# Patient Record
Sex: FEMALE | Race: WHITE | NOT HISPANIC OR LATINO | URBAN - METROPOLITAN AREA
[De-identification: names, ages, dates, MRNs, and addresses within clinical notes are randomized per-mention and may not be internally consistent; named-entity substitution may affect disease eponyms.]

---

## 2022-09-14 ENCOUNTER — TELEMEDICINE (OUTPATIENT)
Dept: BEHAVIORAL/MENTAL HEALTH CLINIC | Facility: CLINIC | Age: 52
End: 2022-09-14
Payer: COMMERCIAL

## 2022-09-14 DIAGNOSIS — F90.2 ATTENTION DEFICIT HYPERACTIVITY DISORDER, COMBINED TYPE: ICD-10-CM

## 2022-09-14 DIAGNOSIS — F33.1 MAJOR DEPRESSIVE DISORDER, RECURRENT EPISODE, MODERATE (HCC): Primary | ICD-10-CM

## 2022-09-14 PROCEDURE — 90832 PSYTX W PT 30 MINUTES: CPT

## 2022-09-14 NOTE — PSYCH
Psychotherapy Provided: Individual Psychotherapy 30 minutes     Length of time in session: 30 minutes, follow up in 2 week  Start time: 1:15 pm  End time: 145 pm     Encounter Diagnosis     ICD-10-CM    1  Major depressive disorder, recurrent episode, moderate (HCC)  F33 1    2  Attention deficit hyperactivity disorder, combined type  F90 2        Goals addressed in session: Goal 1     Pain:      none    0    Current suicide risk : Low     DATA: Alayna shared updates mainly related to her family and current marriage  Clinician provided feedback and engaged cl in socratic questioning and weighing the outcomes    ASSESSMENT: Alert and engaged  No SI/HI    PLAN: Meet in 1-2 weeks       [address]: Diagnosis and Treatment Plan explained to Dequan Schroeder relates understanding diagnosis and is agreeable to Treatment Plan  Yes     This note was not shared with the patient due to this is a psychotherapy note     Virtual Regular Visit    This is a telemedicine visit which is being conducted through[employer] and patient was informed that this is a secure, HIPAA-compliant platform  agrees to proceed  Client acknowledged consent and understanding of privacy and security of the video platform  The patient has agreed to participate and understands they can discontinue the visit at any time  Verification of patient location: Client is home     Patient is located in the following state in which I hold an active license [place]

## 2022-10-04 ENCOUNTER — TELEPHONE (OUTPATIENT)
Dept: BEHAVIORAL/MENTAL HEALTH CLINIC | Facility: CLINIC | Age: 52
End: 2022-10-04

## 2022-10-04 NOTE — TELEPHONE ENCOUNTER
Outreached and informed of resignation   Offered termination session and scheduled for 10/11 at 12pm

## 2022-10-11 ENCOUNTER — DOCUMENTATION (OUTPATIENT)
Dept: BEHAVIORAL/MENTAL HEALTH CLINIC | Facility: CLINIC | Age: 52
End: 2022-10-11

## 2022-10-11 ENCOUNTER — TELEMEDICINE (OUTPATIENT)
Dept: BEHAVIORAL/MENTAL HEALTH CLINIC | Facility: CLINIC | Age: 52
End: 2022-10-11
Payer: COMMERCIAL

## 2022-10-11 DIAGNOSIS — F33.1 MAJOR DEPRESSIVE DISORDER, RECURRENT EPISODE, MODERATE (HCC): Primary | ICD-10-CM

## 2022-10-11 PROCEDURE — 90834 PSYTX W PT 45 MINUTES: CPT

## 2022-10-11 NOTE — PROGRESS NOTES
[facility]    Patient Name Mattie Titus     Date of Birth: 54 y o  1970      MRN: [id number]    Admission Date: 8/31/2021    Date of Transfer: October 11, 2022    Admission Diagnosis:     1  Major Depressive Disorder    Current Diagnosis:     No diagnosis found  Reason for Admission: Any Pate presented for treatment due to depression  Primary complaints included DEPRESSIVE SYMPTOMS: depressed mood, sadness, hopelessness, low energy, low motivation, decreased interest, excessive guilt  Progress in Treatment: Any Pate was seen for Psychiatric Evaluation, Medication Management, Medication Management with Psychotherapy and Individual Couseling  During the course of treatment she demonstrated good progress  Episodes of Higher Level of Care: No    Transfer request Initiated by: Psychiatrist: None Therapist: Leonor Connell    Reason for Transfer Request: clinician leaving practice    Does this individual need a clinician with specialized training/expertise?: No    Is this client working with any other [facility] Providers/Therapists?  Psychiatrist: Margaret Reveles Therapist: None    Other pertinent issues: None    Are there any specific individuals who would be a “best fit” or who have already agreed to accept this transfer request?      Psychiatrist: None   Therapist: Prisca  Rationale: Not Applicable    Attempts to maintain the current therapeutic relationship: Yes, completed termination session    Transfer request routed to Clinical Coordinator for input and/or approval      Comments from other involved providers and/or clinical coordinator: None    Lawyer Cristóbal Zhu10/11/22

## 2022-10-11 NOTE — PSYCH
Start Time: 12:03 pm  End Time: 12:45 pm     Virtual Regular Visit    Verification of patient location:    Patient is located in the following state in which I hold an active license [place]      Assessment/Plan:    Problem List Items Addressed This Visit    None         Goals addressed in session: Goal 1          Reason for visit is No chief complaint on file  Encounter provider BETTINA Martinez    Provider located at [address] [place] [address] [address]  [address] [place] [address]  [phone]      Recent Visits  Date Type Provider Dept   10/04/22 Telephone BETTINA Martinez [facility] Therapist [facility]   Showing recent visits within past 7 days and meeting all other requirements  Future Appointments  No visits were found meeting these conditions  Showing future appointments within next 150 days and meeting all other requirements       The patient was identified by name and date of birth  Joshua Ego was informed that this is a telemedicine visit and that the visit is being conducted through[employer] and patient was informed that this is a secure, HIPAA-compliant platform  She agrees to proceed     My office door was closed  No one else was in the room  She acknowledged consent and understanding of privacy and security of the video platform  The patient has agreed to participate and understands they can discontinue the visit at any time  Patient is aware this is a billable service  DATA: This session was used for termination purposes and client is aware clinician is leaving the practice  Client and clinician reflected on progress thus far and discussed future plans related to therapy     ASSESSMENT: alert and engaged  Denies SI/HI  Reports noticed improvement in symptoms with therapy and med management  PLAN: Transfer to [facility]         [facility]     No past medical history on file  No past surgical history on file      No current outpatient medications on file  No current facility-administered medications for this visit  Not on File    Review of Systems    Video Exam    There were no vitals filed for this visit      Physical Exam     I spent 42 minutes directly with the patient during this visit    This note was not shared with the patient due to this is a psychotherapy note

## 2022-11-01 ENCOUNTER — TELEPHONE (OUTPATIENT)
Dept: PSYCHIATRY | Facility: CLINIC | Age: 52
End: 2022-11-01

## 2022-11-01 NOTE — TELEPHONE ENCOUNTER
Pt has been scheduled for ROSALVA from [facility] to [facility] on 11/8 at [address] [place] [address]

## 2022-11-02 ENCOUNTER — TELEMEDICINE (OUTPATIENT)
Dept: PSYCHIATRY | Facility: CLINIC | Age: 52
End: 2022-11-02

## 2022-11-02 DIAGNOSIS — F41.1 GENERALIZED ANXIETY DISORDER: ICD-10-CM

## 2022-11-02 DIAGNOSIS — F32.A DEPRESSION, UNSPECIFIED DEPRESSION TYPE: Primary | ICD-10-CM

## 2022-11-02 RX ORDER — SERTRALINE HYDROCHLORIDE 25 MG/1
25 TABLET, FILM COATED ORAL DAILY
Qty: 30 TABLET | Refills: 1 | Status: SHIPPED | OUTPATIENT
Start: 2022-11-02

## 2022-11-02 RX ORDER — BUPROPION HYDROCHLORIDE 200 MG/1
200 TABLET, EXTENDED RELEASE ORAL 2 TIMES DAILY
Qty: 30 TABLET | Refills: 1 | Status: SHIPPED | OUTPATIENT
Start: 2022-11-02

## 2022-11-02 RX ORDER — BUPROPION HYDROCHLORIDE 300 MG/1
300 TABLET ORAL DAILY
COMMUNITY
Start: 2022-08-03 | End: 2022-11-02

## 2022-11-02 NOTE — PSYCH
Virtual Regular Visit    Verification of patient location:    Patient is located in the following state in which I hold an active license [place]      Assessment/Plan:  Decrease wellbutrin 200 mg   Continue other meds  Pt will f/u with PCP re: amenorrhea    Problem List Items Addressed This Visit    None     Visit Diagnoses     Depression, unspecified depression type    -  Primary    Relevant Medications    sertraline (ZOLOFT) 50 mg tablet    sertraline (Zoloft) 25 mg tablet    buPROPion (Wellbutrin SR) 200 MG 12 hr tablet    Generalized anxiety disorder        Relevant Medications    sertraline (ZOLOFT) 50 mg tablet    sertraline (Zoloft) 25 mg tablet    buPROPion (Wellbutrin SR) 200 MG 12 hr tablet          Goals addressed in session: Goal 1          Reason for visit is   Chief Complaint   Patient presents with   • Medication Management        Encounter provider Isaac Stehpens MD    Provider located at [address] [facility]  [address]  [phone]      Recent Visits  Date Type Provider Dept   11/01/22 Telephone Provider [facility] recent visits within past 7 days and meeting all other requirements  Today's Visits  Date Type Provider Dept   11/02/22 Telemedicine Isaac Stephens  [address] [facility] today's visits and meeting all other requirements  Future Appointments  No visits were found meeting these conditions  Showing future appointments within next 150 days and meeting all other requirements       The patient was identified by name and date of birth  Sundeep Colbert was informed that this is a telemedicine visit and that the [facility]  She agrees to proceed     My office door was closed  No one else was in the room  She acknowledged consent and understanding of privacy and security of the video platform   The patient has agreed to participate and understands they can discontinue the visit at any time  Patient is aware this is a billable service  Subjective  Daryle Crease is a 54 y o  female with depression and anxiety presents for regular f/u    Compliant with meds  SE constipation, vivid scary dreams  Pt reports feeling mostly stable  Medical - body aches; constipation  Family stress      HPI   Mood - improving; less depressed; better energy and functioning; denies mood swings  Anxiety - worries sometimes; denies panic attacks  concerned about vivid dreams    History reviewed  No pertinent past medical history  History reviewed  No pertinent surgical history  Current Outpatient Medications   Medication Sig Dispense Refill   • buPROPion (Wellbutrin SR) 200 MG 12 hr tablet Take 1 tablet (200 mg total) by mouth 2 (two) times a day 30 tablet 1   • sertraline (Zoloft) 25 mg tablet Take 1 tablet (25 mg total) by mouth daily With 50 mg 30 tablet 1   • sertraline (ZOLOFT) 50 mg tablet Take 1 tablet (50 mg total) by mouth daily 30 tablet 1     No current facility-administered medications for this visit  Not on File    Review of Systems   Constitutional: Negative for activity change and appetite change  Psychiatric/Behavioral: Positive for sleep disturbance (vivid dreams)  Negative for suicidal ideas  Video Exam    There were no vitals filed for this visit  Physical Exam  Constitutional:       Appearance: Normal appearance  She is normal weight  Neurological:      Mental Status: She is alert  Psychiatric:         Attention and Perception: Perception normal          Mood and Affect: Mood and affect normal          Speech: Speech normal          Behavior: Behavior normal  Behavior is cooperative  Thought Content:  Thought content normal          Judgment: Judgment normal           Visit Time    Visit Start Time: 9 23 am  Visit Stop Time: 9 32 am  Total Visit Duration: 18

## 2022-11-08 ENCOUNTER — TELEMEDICINE (OUTPATIENT)
Dept: BEHAVIORAL/MENTAL HEALTH CLINIC | Facility: CLINIC | Age: 52
End: 2022-11-08

## 2022-11-08 DIAGNOSIS — F32.A DEPRESSION, UNSPECIFIED DEPRESSION TYPE: Primary | ICD-10-CM

## 2022-11-08 NOTE — BH TREATMENT PLAN
Jenny Power  1970       Date of Initial Treatment Plan: 11/8/2022  Date of Current Treatment Plan: 11/08/22    Treatment Plan Number 1    Strengths/Personal Resources for Self Care:     Diagnosis:   No diagnosis found  Area of Needs: passing of father,communication with son (age 25) and his MH, blended family, relationship with mother, feeling inadequate in marriage      Long Term Goal 1: plan to follow up next scheduled session    Target Date: plan to follow up next scheduled session  Completion Date: N/A         Short Term Objectives for Goal 1: Verbalize and process thoughts and feelings  Long Term Goal 2: N/A    Target Date: N/A  Completion Date: N/A    Short Term Objectives for Goal 2: Increase assertiveness skills          Long Term Goal # 3: N/A     Target Date: N/A  Completion Date: N/A    Short Term Objectives for Goal 3: N/A    GOAL 1: Modality: Individual NAx per month   Completion Date NA    GOAL 2: Modality: Individual NAx per month   Completion Date NA     GOAL 3: Modality: Individual NAx per month   Completion Date NA      Behavioral Health Treatment Plan [facility]: Diagnosis and Treatment Plan explained to Jason Owens relates understanding diagnosis and is agreeable to Treatment Plan            Client Comments : Please share your thoughts, feelings, need and/or experiences regarding your treatment plan:

## 2022-11-08 NOTE — PSYCH
Started on [facility] embedded  Issues with connection  Received permission from client to send marshanow invite  Elvi Stephenson now session started at  [phone] am-2920 am       Psychotherapy Provided: Individual Psychotherapy 51 minutes     Length of time in session: 51 minutes, follow up in 12/15/22 at 12:00 PM week    Encounter Diagnosis     ICD-10-CM    1  Depression, unspecified depression type  F32  A        Goals addressed in session: Began to build rapport with client and to begin collaborative treatment plan in Epic  Due to time constraints treatment plan was not completed  Client agreed to complete tx plan next scheduled session  Pain:      none    Pain not dicussed     Current suicide risk : Low     DATA: Met with Marianna Oscar for scheduled individual session  Topics of discussion included family stressors and goals, client history  This is client's initial session with this psychotherapist since transfer from [facility]  Session was utilized to build rapport, collect background information on past treatment, diagnosis and history  Client has history of Outpatient counseling  Reports history of depression, ADHD  Reports she remarried in January 2022 after father passed January 2 after diagnosis with ALS (March 2020)  Now has blended family and step children  Works for [employer] and is waiting on a raise  Dicussed possible goals with assertiveness  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)  Reports feeling "not good enough" as a mother or doing enough, a lot of "shoulds " Reports she is taking her mother for a doctor's appointment for her memory  Seeking help to support 25year old son with MH difficulties and reports MH "feelings of inadequacy" impacts her marriage  She reports she would like to work towards not needing medication  Reports currently on Wellbutrin and sertraline, and has been having dreams   ADHD almost 6years old, son 25years old, daughter 24 years old  Step children include daughter (age 25) at [facility])  Reports 's ex wife can be difficult  Has a 15year old step son and 8year old step daughter  Has step children 50% of time  Sister is older  ASSESSMENT: Alma Trujillo presents with a good mood  Her affect is normal range and intensity, appropriate  Alma Trujillo  was engaged and alert in today's session  PLAN:  At the next session, this clinician and client agreed to work on [employer] tx plan  Client requested follow up message (email or epic) with clincian's ext given during today's appointment and date of next scheduled appointment  Psychotherapist will follow up with message  with date of next appointment and ext for scheduling purposes  Agreed to meet next week re: new client /building therapeutic relationship       [address]: Diagnosis and Treatment Plan explained to Petar Brown relates understanding diagnosis and is agreeable to Treatment Plan   Yes     Visit start and stop times:    11/08/22  Start Time: 0914  Stop Time: 1005  Total Visit Time: 51 minutes

## 2022-11-15 ENCOUNTER — DOCUMENTATION (OUTPATIENT)
Dept: BEHAVIORAL/MENTAL HEALTH CLINIC | Facility: CLINIC | Age: 52
End: 2022-11-15

## 2022-11-15 ENCOUNTER — TELEMEDICINE (OUTPATIENT)
Dept: BEHAVIORAL/MENTAL HEALTH CLINIC | Facility: CLINIC | Age: 52
End: 2022-11-15

## 2022-11-15 ENCOUNTER — TELEPHONE (OUTPATIENT)
Dept: PSYCHIATRY | Facility: CLINIC | Age: 52
End: 2022-11-15

## 2022-11-15 DIAGNOSIS — F32.A DEPRESSION, UNSPECIFIED DEPRESSION TYPE: Primary | ICD-10-CM

## 2022-11-15 NOTE — PSYCH
Virtual Regular Visit    Verification of patient location: observed in her home    Patient is located in the following state in which I hold an active license [place]      Assessment/Plan:    Problem List Items Addressed This Visit        Other    Depression - Primary          Goals addressed in session: Goal 1          Reason for visit is No chief complaint on file  Encounter provider Guido Be, [facility]    Provider located [address]  [address]  [phone]      Recent Visits  Date Type Provider Dept   11/08/22 Telemedicine Guido Be, [address] Therapist [facility]   Showing recent visits within past 7 days and meeting all other requirements  Today's Visits  Date Type Provider Dept   11/15/22 Telemedicine Guido Be, [address] Therapist [facility]   Showing today's visits and meeting all other requirements  Future Appointments  No visits were found meeting these conditions  Showing future appointments within next 150 days and meeting all other requirements       Gaetano Izaguirre was informed that this is a   [facility] embedded visit  She agrees to proceed     My office door was closed  No one else was in the room  The patient has agreed to participate and understands they can discontinue the visit at any time  Patient is aware this is a billable service  Subjective  Gaetano Izaguirre is a 54 y o  female    HPI     No past medical history on file  No past surgical history on file      Current Outpatient Medications   Medication Sig Dispense Refill   • buPROPion (Wellbutrin SR) 200 MG 12 hr tablet Take 1 tablet (200 mg total) by mouth 2 (two) times a day 30 tablet 1   • sertraline (Zoloft) 25 mg tablet Take 1 tablet (25 mg total) by mouth daily With 50 mg 30 tablet 1   • sertraline (ZOLOFT) 50 mg tablet Take 1 tablet (50 mg total) by mouth daily 30 tablet 1 No current facility-administered medications for this visit  Not on File    Review of Systems    Video Exam    There were no vitals filed for this visit  Physical Exam     Visit Time    Visit Start Time: 12:02 PM  Visit Stop Time:  1:10 PM  Total Visit Duration: 68 minutes    Psychotherapy Provided: Individual Psychotherapy 68 minutes     Length of time in session: 68 minutes  Encounter Diagnosis     ICD-10-CM    1  Depression, unspecified depression type  F32  A        Goals addressed in session: Goal 1     Pain:      not dicussed        Current suicide risk : Low     DATA: Met with Antony Forman for scheduled individual session  Dicussed this clinician leaving [facility]  Antony Forman agreed to continue with telehealth therapy appointment and then to transfer after today's session  Beginning of session was utilized to engage client and to inform her of [facility] Psychotherapist leaving and to provide options for continued treatment  Antony Forman utilized the session to process past relational history including being bullied as a child and romantic relationships, ex boyfriend and current relationship  Dicussed pattern she believes both her and her  engage in (self-blame of themselves) leading to issues  Dicussed fantasy of ex  what could of been and desire to work on relationship with  in therapy  Time was taken in today's session to discuss what the transfer process would look like and termination  At the end psychotherapist recommended learning about attachment styles  Antony Forman was observed tearful at the end of session however smiling  She thanked therapist for the time spent in the past two sessions and agreed to wait for follow up from client registration  ASSESSMENT: Antony Forman presents with a good mood  Her affect is normal range and intensity, appropriate  Antony Forman exhibits good therapeutic rapport with this clinician   Antony Forman continues to exhibit willingness to work on treatment goals and objectives  Donna Alexandre presents with a low risk to self and low risk to others  PLAN: Donna Alexandre will return transfer to a new assigned counselor due to this psychotherapist leaving [facility]  Behavioral Health Treatment Plan [facility]: Diagnosis and Treatment Plan explained to Mervin Necessary relates understanding diagnosis and is agreeable to Treatment Plan   Yes       Visit start and stop times:    11/15/22  Start Time: 3928  Stop Time: 1310  Total Visit Time: 68 minutes

## 2022-11-15 NOTE — TELEPHONE ENCOUNTER
Pt has been scheduled for ROSALVA from Andrés Lerner on 11/22 at [address] [place] [address] with Jil Snyder

## 2022-11-15 NOTE — PSYCH
Virtual Regular Visit    Verification of patient location:    Patient is located in the following state in which I hold an active license [place]      Assessment/Plan:    Problem List Items Addressed This Visit        Other    Depression - Primary          Goals addressed in session:  Initial transfer session with client collecting information  Dicussed goals  Reason for visit is No chief complaint on file  Encounter provider Nyasia Spence, [facility]    Provider  [facility] THERAPIST [facility]  [address]  [phone]      Recent Visits  Date Type Provider Dept   11/08/22 Telemedicine Nyasia Spence, [address] [facility] Therapist [facility]   Showing recent visits within past 7 days and meeting all other requirements  Future Appointments  No visits were found meeting these conditions  Showing future appointments within next 150 days and meeting all other requirements       The patient was identified by name and date of birth  Jenny Power was informed that this is a telemedicine visit and that the visit is being conducted thr[employer]he [employer] platform  She agrees to proceed     My office door was closed  No one else was in the room  She acknowledged consent and understanding of privacy and security of the video platform  The patient has agreed to participate and understands they can discontinue the visit at any time  Patient is aware this is a billable service  Subjective  Jenny Power is a 54 y o  female    HPI     No past medical history on file  No past surgical history on file      Current Outpatient Medications   Medication Sig Dispense Refill   • buPROPion (Wellbutrin SR) 200 MG 12 hr tablet Take 1 tablet (200 mg total) by mouth 2 (two) times a day 30 tablet 1   • sertraline (Zoloft) 25 mg tablet Take 1 tablet (25 mg total) by mouth daily With 50 mg 30 tablet 1   • sertraline (ZOLOFT) 50 mg tablet Take 1 tablet (50 mg total) by mouth daily 30 tablet 1     No current facility-administered medications for this visit  Not on File    Review of Systems    Video Exam    There were no vitals filed for this visit      Physical Exam     Visit Time    Visit Start Time: 914am  Visit Stop Time: 10:05am  Total Visit Duration: 51 minutes

## 2022-11-22 ENCOUNTER — SOCIAL WORK (OUTPATIENT)
Dept: BEHAVIORAL/MENTAL HEALTH CLINIC | Facility: CLINIC | Age: 52
End: 2022-11-22

## 2022-11-22 DIAGNOSIS — F33.1 MAJOR DEPRESSIVE DISORDER, RECURRENT EPISODE, MODERATE (HCC): Primary | ICD-10-CM

## 2022-11-22 DIAGNOSIS — F90.2 ATTENTION DEFICIT HYPERACTIVITY DISORDER, COMBINED TYPE: ICD-10-CM

## 2022-11-22 NOTE — PSYCH
Psychotherapy Provided: Individual Psychotherapy 57 minutes     Length of time in session: 57 minutes, follow up in 1 week    Encounter Diagnosis     ICD-10-CM    1  Major depressive disorder, recurrent episode, moderate (HCC)  F33 1       2  Attention deficit hyperactivity disorder, combined type  F90 2           Goals addressed in session: Goal 1 and Goal 2     Pain:      moderate to severe    0    Current suicide risk : Low      DATA: Met with Meghan Herman for scheduled individual session  Topics of discussion included relationship with significant other, family stressors, mood regulation and symptoms, intimacy and sexuality and grief and loss  Client reported that her son Yogesh aBe is celebrating his 11th birthday today  She reported having two other biological children named Noni Neri (24years old) and Noni Neri (25years old)  Clinet reported her daughter Noni Neri dropped out of high school when she was 18years old and obtained her GED she recently went through a break-up with a long term boyfriend and moved back home this past July on her 23st birthday  Client discussed that her  has children from two past marriage, he has an 25year old daughter from the fist marriage and two sons, Hazel Whittaker (15years old) and Almond Harada (8years old) from the second marriage  Client discussed that her father past away January second of this year and this will be the first Thanksgiving without him  She discussed their plans and her concern with mom  Client reported that her sister (54 years old) helps to take care of their mom and gets her prepared for bed each evening and her bother (60 years old) comes to visit every Thursday  Client discussed that mom has been having a hard time managing grief and has been reaching out to strangers and being conned into sending money and gift cards to strangers  Client discussed that mom is lonely and she has having a hard time connecting her with healthy things   Therapist provided contact information for Demian  [facility] [address] team to provide additional resources and support  Client reported that she was previously  to the father of her children and recently remarried January 22nd of this year  Client reported that her  has suffered from a lot of trauma in his life and has experienced medical issues resulting in needing his leg amputated, which has made life for challenging for him  Client reported issues related to intimacy, that she believes are physically connected to her 's leg amputation  She dicussed that this has caused additional stress in their relationship and her  is fearful of her cheating on him as his past two wives did  Client shows evidence of utilizing emotion regulation skills and effective communication skills skills to manage mental health symptoms  During this session, this clinician used the following therapeutic modalities: engagement strategies, supportive psychotherapy and Motivational Interviewing  ASSESSMENT: Diya Pickett presents with a depressed, anxious mood  Her affect is normal range and intensity, appropriate  Diya Pickett exhibits early engagement with this clinician  Diya Pickett continues to exhibit willingness to work on treatment goals and objectives  Diya Pickett presents with a minimal risk of suicide, minimal risk of self-harm, and minimal risk of harm to others  PLAN: Diya Pickett will return in 1 week for the next scheduled session  Between sessions, Diya Pickett will continue to utilize positive coping skills to manage mood and will report back during the next session re: successes and barriers  At the next session, this clinician will use engagement strategies, supportive psychotherapy and Motivational Interviewing to address symptoms and perceived issues in relationships, in an effort to [address] with meeting treatment goals           Behavioral Health Treatment Plan [facility]: Diagnosis and Treatment Plan explained to Nehemias Brandon relates understanding diagnosis and is agreeable to Treatment Plan   Yes     Visit start and stop times:    11/22/22  Start Time: 0901  Stop Time: 6942  Total Visit Time: 57 minutes

## 2022-11-23 ENCOUNTER — TELEPHONE (OUTPATIENT)
Dept: BEHAVIORAL/MENTAL HEALTH CLINIC | Facility: CLINIC | Age: 52
End: 2022-11-23

## 2022-11-23 NOTE — TELEPHONE ENCOUNTER
ANDRE contacted patient to reschedule her appointment for 11/29   Client was rescheduled for 12/1 at 2:00pm

## 2022-11-30 ENCOUNTER — TELEMEDICINE (OUTPATIENT)
Dept: PSYCHIATRY | Facility: CLINIC | Age: 52
End: 2022-11-30

## 2022-11-30 DIAGNOSIS — F32.A DEPRESSION, UNSPECIFIED DEPRESSION TYPE: Primary | ICD-10-CM

## 2022-11-30 DIAGNOSIS — F41.1 GENERALIZED ANXIETY DISORDER: ICD-10-CM

## 2022-11-30 RX ORDER — BUPROPION HYDROCHLORIDE 150 MG/1
150 TABLET ORAL DAILY
Qty: 30 TABLET | Refills: 1 | Status: SHIPPED | OUTPATIENT
Start: 2022-11-30

## 2022-11-30 RX ORDER — SERTRALINE HYDROCHLORIDE 25 MG/1
25 TABLET, FILM COATED ORAL DAILY
Qty: 30 TABLET | Refills: 1 | Status: SHIPPED | OUTPATIENT
Start: 2022-11-30

## 2022-11-30 NOTE — PSYCH
Virtual Regular Visit    Verification of patient location:    Patient is located in the following state in which I hold an active license [place]      Assessment/Plan:    Problem List Items Addressed This Visit        Other    Depression - Primary    Relevant Medications    sertraline (ZOLOFT) 50 mg tablet    sertraline (Zoloft) 25 mg tablet    buPROPion (Wellbutrin XL) 150 mg 24 hr tablet   Other Visit Diagnoses     Generalized anxiety disorder        Relevant Medications    sertraline (ZOLOFT) 50 mg tablet    sertraline (Zoloft) 25 mg tablet    buPROPion (Wellbutrin XL) 150 mg 24 hr tablet          Goals addressed in session: Goal 1          Reason for visit is   Chief Complaint   Patient presents with   • Medication Management        Encounter provider Marilu De MD    Provider located at [id number] [facility]  [address]  [phone]      Recent Visits  No visits were found meeting these conditions  Showing recent visits within past 7 days and meeting all other requirements  Today's Visits  Date Type Provider Dept   11/30/22 Telemedicine Marilu De, [facility] today's visits and meeting all other requirements  Future Appointments  No visits were found meeting these conditions  Showing future appointments within next 150 days and meeting all other requirements       The patient was identified by name and date of birth  Sue Arias was informed that this is a telemedicine visit and that [facility]  She agrees to proceed     My office door was closed  No one else was in the room  She acknowledged consent and understanding of privacy and security of the video platform  The patient has agreed to participate and understands they can discontinue the visit at any time  Patient is aware this is a billable service       Subjective  Sue Arias is a 54 y o  female with depression and anxiety presents for regular f/u     compliant with meds  SE wellbutrin constipation and vivid dreams - less severe  Needs PCP f/u for amenorrhea  Getting ready  for holidays    HPI   Mood - better energy, but low motivation  Less depressed; functions close to baseline  Social , active  Anxiety - worries about holidays    History reviewed  No pertinent past medical history  History reviewed  No pertinent surgical history  Current Outpatient Medications   Medication Sig Dispense Refill   • buPROPion (Wellbutrin XL) 150 mg 24 hr tablet Take 1 tablet (150 mg total) by mouth daily 30 tablet 1   • sertraline (Zoloft) 25 mg tablet Take 1 tablet (25 mg total) by mouth daily With 50 mg 30 tablet 1   • sertraline (ZOLOFT) 50 mg tablet Take 1 tablet (50 mg total) by mouth daily 30 tablet 1     No current facility-administered medications for this visit  Not on File    Review of Systems   Constitutional: Negative for activity change and appetite change  Psychiatric/Behavioral: Negative for sleep disturbance and suicidal ideas  Video Exam    There were no vitals filed for this visit  Physical Exam  Constitutional:       Appearance: Normal appearance  She is normal weight  Neurological:      Mental Status: She is alert  Psychiatric:         Attention and Perception: Attention and perception normal          Mood and Affect: Affect normal  Mood is anxious  Speech: Speech normal          Behavior: Behavior normal  Behavior is cooperative  Thought Content:  Thought content normal          Judgment: Judgment normal           Visit Time    Visit Start Time: 8 59  Visit Stop Time: 9 10 am   Total Visit Duration: 18 minutes

## 2022-12-01 ENCOUNTER — TELEMEDICINE (OUTPATIENT)
Dept: BEHAVIORAL/MENTAL HEALTH CLINIC | Facility: CLINIC | Age: 52
End: 2022-12-01

## 2022-12-01 DIAGNOSIS — F33.1 MAJOR DEPRESSIVE DISORDER, RECURRENT EPISODE, MODERATE (HCC): ICD-10-CM

## 2022-12-01 DIAGNOSIS — F90.2 ATTENTION DEFICIT HYPERACTIVITY DISORDER, COMBINED TYPE: Primary | ICD-10-CM

## 2022-12-02 NOTE — PSYCH
Virtual Regular Visit    Verification of patient location:    Patient is located in the following state in which I hold an active license [place]      Assessment/Plan:    Problem List Items Addressed This Visit        Other    Major depressive disorder, recurrent episode, moderate ([address] )    Attention deficit hyperactivity disorder, combined type - Primary       Goals addressed in session: Goal 1          Reason for visit is No chief complaint on file  Encounter provider MARIOLA Bojorquez    Provider located at [address] [place] [address]  [phone]      Recent Visits  Date Type Provider Dept   12/01/22 [facility]icine [facility]   Showing recent visits within past 7 days and meeting all other requirements  Future Appointments  No visits were found meeting these conditions  Showing future appointments within next 150 days and meeting all other requirements       The patient was identified by name and date of birth  Zuleyka Ray was informed that this is a telemedicine visit and that [facility]  She agrees to proceed     My office door was closed  No one else was in the room  She acknowledged consent and understanding of privacy and security of the video platform  The patient has agreed to participate and understands they can discontinue the visit at any time  Patient is aware this is a billable service  Subjective  Zuleyka Ray is a 54 y o  female  DATA: Met with Delmer Murillo for scheduled individual session  Topics of discussion included relationship with significant other, family stressors, trauma history, financial issues, mood regulation and symptoms, intimacy and sexuality and grief and loss  Client reported meeting with her provider to reduce her Wellbutrin dosage from 300mg to 150mg due to experiencing an increase in disturbing dreams at night and not seeing an improvement in mood  Client reported she has been trying to make more of an effort to get things done and has been working on organizing her room, which has been a goal she has been working on for the past two years  Client discussed how she spent Thanksgiving and reported that she handled it better than she had thought as this was the first Thanksgiving without her father that they actually celebrated  Patient reported that they had 100 people together for dinner and she put a picture of both her father and her niece, who passed away with a saying and a candle  She discussed [place] is going to be challenging for her and shared that she doesn't feel as though she is able to hold onto any of her traditions now that she has come together with her   She reported that she doesn't feel like she has a choice in the decorations and things like that because they moved into his home and he takes over  Therapist encouraged client to have a conversation with her  about this and helped put the situation into a different perspective for her to understand that her traditions matter as well  Client discussed that it has been challenging to schedule times for her children and her 's children to do holiday things, which has also made things more difficult  Client discussed her feelings of jealousy and how she is concerned that it is impacting her relationship as well as her negative self image  Client discussed her previous relationship with her ex- and identified that he was her best friend and there was a lot of hurt because she felt like he didn't want her and there was issues with intimacy, she later discovered that the issue was pornography and she felt like she started comparing herself to everyone else  Client discussed that she is currently heavier now than she previously was and does not feel desirable   Client shows evidence of utilizing weighing pros and cons and effective communication skills skills to manage mental health symptoms  During this session, this clinician used the following therapeutic modalities: engagement strategies, CBT techniques, Motivational Interviewing and solution-focused therapy  The clinician assigned the following for Meghan Herman to complete prior to the next session: Client to write a letter from her current self, to a past self at a time in her life when she really needed support  What would you want to tell yourself? What would you want to know? ASSESSMENT: Meghan Herman presents with a less anxious mood  Her affect is normal range and intensity, appropriate  Meghan Herman exhibits early engagement with this clinician  Meghan Herman continues to exhibit willingness to work on treatment goals and objectives  Meghan Herman presents with a minimal risk of suicide, minimal risk of self-harm, and minimal risk of harm to others  PLAN: Meghan Herman will return in 1 week for the next scheduled session  Between sessions, Meghan Herman will continue to utilize positive coping skills to manage mood and will report back during the next session re: successes and barriers  At the next session, this clinician will use engagement strategies, CBT techniques, Motivational Interviewing and solution-focused therapy to address symptoms, in an effort to [facility] with meeting treatment goals  HPI     No past medical history on file  No past surgical history on file  Current Outpatient Medications   Medication Sig Dispense Refill   • buPROPion (Wellbutrin XL) 150 mg 24 hr tablet Take 1 tablet (150 mg total) by mouth daily 30 tablet 1   • sertraline (Zoloft) 25 mg tablet Take 1 tablet (25 mg total) by mouth daily With 50 mg 30 tablet 1   • sertraline (ZOLOFT) 50 mg tablet Take 1 tablet (50 mg total) by mouth daily 30 tablet 1     No current facility-administered medications for this visit          Not on File    Review of Systems    Video Exam    There were no vitals filed for this visit      Physical Exam       Visit start and stop times:    12/02/22  Start Time: 8966  Stop Time: 1500  Total Visit Time: 56 minutes

## 2022-12-06 ENCOUNTER — TELEMEDICINE (OUTPATIENT)
Dept: BEHAVIORAL/MENTAL HEALTH CLINIC | Facility: CLINIC | Age: 52
End: 2022-12-06

## 2022-12-06 DIAGNOSIS — F90.2 ATTENTION DEFICIT HYPERACTIVITY DISORDER, COMBINED TYPE: ICD-10-CM

## 2022-12-06 DIAGNOSIS — F33.1 MAJOR DEPRESSIVE DISORDER, RECURRENT EPISODE, MODERATE (HCC): Primary | ICD-10-CM

## 2022-12-06 NOTE — PSYCH
Virtual Regular Visit    Verification of patient location:    Patient is located in the following state in which I hold an active license [place]      Assessment/Plan:    Problem List Items Addressed This Visit        Other    Major depressive disorder, recurrent episode, moderate ([address] ) - Primary    Attention deficit hyperactivity disorder, combined type       Goals addressed in session: Goal 1 and Goal 3           Reason for visit is No chief complaint on file  Encounter provider MARIOLA Fernandez    Provider located at [address] [place] [address]  [phone]      Recent Visits  Date Type Provider Dept   12/01/22 Telemedicine Desean Bill, [address] Therapist [facility]   Showing recent visits within past 7 days and meeting all other requirements  Today's Visits  Date Type Provider Dept   12/06/22 Telemedicine Desean Bill [address] Therapist [facility]   Showing today's visits and meeting all other requirements  Future Appointments  No visits were found meeting these conditions  Showing future appointments within next 150 days and meeting all other requirements       The patient was identified by name and date of birth  Arlin Coleman was informed that this is a telemedicine visit and that [facility]  She agrees to proceed     My office door was closed  No one else was in the room  She acknowledged consent and understanding of privacy and security of the video platform  The patient has agreed to participate and understands they can discontinue the visit at any time  Patient is aware this is a billable service  Subjective  Arlin Coleman is a 54 y o  female  DATA: Met with Candace Childers for scheduled individual session   Topics of discussion included family stressors, marital stress, parenting concerns, mood regulation and symptoms and grief and loss  Client reported having an "okay" week  Client showed therapist her pet dog and parrot and reported that they are good supports for her  Client discussed that her youngest son Taina Simpson (6years old) had a rough night and had been arguing with her step-son Serena Long (15years old)  Client reported that her step-son has been dealing with a lot of stress regarding his relationship with his biological mother and their relationship and he has been acting out as a result  Client reported knowing that she needs to have a conversation with her  Thor Johnson about his behavior, however is concerned that he will internalize the issues and be offended  Therapist provided support and explored this with client  Client discussed that her son Taina Simpson was also reading a book for school last night where the character's grandfather passes away and he expressed to patient that he feels guilty and regrets not going over to his own grandfather in the moments he passed to say goodbye, although he was in the room  Client discussed how she handled the situation and therapist provided support and praised client for her approach  Client was tearful and acknowledged missing her father  Client discussed stressors between her and her 's second wife and how that has impacted her self esteem  Client shows evidence of utilizing CBT thought record, weighing pros and cons, emotion regulation skills and effective communication skills skills to manage mental health symptoms  During this session, this clinician used the following therapeutic modalities: engagement strategies, CBT techniques and solution-focused therapy  The clinician assigned the following for Renu Guerrero to complete prior to the next session:  ASSESSMENT: Renu Guerrero presents with a less anxious mood  Her affect is tearful, mood-congruent  Renukelly Guerrero exhibits good therapeutic rapport with this clinician   Renu Guerrero continues to exhibit willingness to work on treatment goals and objectives  Slim Escobedo presents with a minimal risk of suicide, minimal risk of self-harm, and minimal risk of harm to others  PLAN: Slim Escobedo will return in 1 week for the next scheduled session  Between sessions, Slim Escobedo will continue to uitlize positive coping skills and will report back during the next session re: successes and barriers  At the next session, this clinician will use engagement strategies, CBT techniques, Motivational Interviewing and solution-focused therapy to address symptoms and relationship concerns, in an effort to [facility] with meeting treatment goals  HPI     No past medical history on file  No past surgical history on file  Current Outpatient Medications   Medication Sig Dispense Refill   • buPROPion (Wellbutrin XL) 150 mg 24 hr tablet Take 1 tablet (150 mg total) by mouth daily 30 tablet 1   • sertraline (Zoloft) 25 mg tablet Take 1 tablet (25 mg total) by mouth daily With 50 mg 30 tablet 1   • sertraline (ZOLOFT) 50 mg tablet Take 1 tablet (50 mg total) by mouth daily 30 tablet 1     No current facility-administered medications for this visit  Not on File    Review of Systems    Video Exam    There were no vitals filed for this visit      Physical Exam     12/06/22  Start Time: 0901  Stop Time: 4021  Total Visit Time: 52 minutes

## 2022-12-23 ENCOUNTER — TELEPHONE (OUTPATIENT)
Dept: BEHAVIORAL/MENTAL HEALTH CLINIC | Facility: CLINIC | Age: 52
End: 2022-12-23

## 2022-12-23 ENCOUNTER — TELEMEDICINE (OUTPATIENT)
Dept: BEHAVIORAL/MENTAL HEALTH CLINIC | Facility: CLINIC | Age: 52
End: 2022-12-23

## 2022-12-23 DIAGNOSIS — F33.1 MAJOR DEPRESSIVE DISORDER, RECURRENT EPISODE, MODERATE (HCC): Primary | ICD-10-CM

## 2022-12-23 DIAGNOSIS — F90.2 ATTENTION DEFICIT HYPERACTIVITY DISORDER, COMBINED TYPE: ICD-10-CM
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
## 2022-12-30 ENCOUNTER — TELEMEDICINE (OUTPATIENT)
Dept: BEHAVIORAL/MENTAL HEALTH CLINIC | Facility: CLINIC | Age: 52
End: 2022-12-30

## 2022-12-30 DIAGNOSIS — F33.1 MAJOR DEPRESSIVE DISORDER, RECURRENT EPISODE, MODERATE (HCC): Primary | ICD-10-CM

## 2022-12-30 DIAGNOSIS — F90.2 ATTENTION DEFICIT HYPERACTIVITY DISORDER, COMBINED TYPE: ICD-10-CM

## 2022-12-30 NOTE — PSYCH
Virtual Regular Visit    Verification of patient location:    Patient is located in the following state in which I hold an active license [place]      Assessment/Plan:    Problem List Items Addressed This Visit        Other    Major depressive disorder, recurrent episode, moderate ([address] ) - Primary    Attention deficit hyperactivity disorder, combined type       Goals addressed in session: Goal 1          Reason for visit is No chief complaint on file  Encounter provider MARIOLA Booth    Provider located at [address] [place] [address]  [phone]      Recent Visits  Date Type Provider Dept   12/30/22 Telemedicine Isaac Lubin, [address] [facility] Therapist op   Showing recent visits within past 7 days and meeting all other requirements  Future Appointments  No visits were found meeting these conditions  Showing future appointments within next 150 days and meeting all other requirements       The patient was identified by name and date of birth  Mimi Beck was informed that this is a telemedicine visit and [facility]  She agrees to proceed     My office door was closed  No one else was in the room  She acknowledged consent and understanding of privacy and security of the video platform  The patient has agreed to participate and understands they can discontinue the visit at any time  Patient is aware this is a billable service  Subjective  Mimi Beck is a 50 y o  female  DATA: Met with Norleen Moritz for scheduled individual session  Topics of discussion included relationship with significant other, family stressors, marital stress, financial issues, mood regulation and symptoms and grief and loss  Client reported that the past week has been challenging for her emotionally   She shared how she celebrated Cristina with her family and became tearful discussing how she misses her father  Therapist actively listened and provided empathy  Client discussed that she plans on having a conversation with her  about the concerns that she has with their relationship  Therapist provided support and explored this with client; client engaged  Client shows evidence of utilizing weighing pros and cons, emotion regulation skills and effective communication skills skills to manage mental health symptoms  During this session, this clinician used the following therapeutic modalities: engagement strategies, supportive psychotherapy, CBT techniques and solution-focused therapy  ASSESSMENT: Slim Escobedo presents with a depressed, less anxious mood  Her affect is tearful, mood-congruent  Slim Escobedo exhibits good therapeutic rapport with this clinician  Slim Escobedo continues to exhibit willingness to work on treatment goals and objectives  Slim Escobedo presents with a minimal risk of suicide, minimal risk of self-harm, and minimal risk of harm to others  PLAN: Slim Escobedo will return in 2 weeks for the next scheduled session  Between sessions, Slim Escobedo will continue to utilize positive coping skills to manage mood and will report back during the next session re: successes and barriers  At the next session, this clinician will use engagement strategies, CBT techniques and solution-focused therapy to address symptoms and relationship concerns, in an effort to [facility] with meeting treatment goals  HPI     No past medical history on file  No past surgical history on file      Current Outpatient Medications   Medication Sig Dispense Refill   • buPROPion (Wellbutrin XL) 150 mg 24 hr tablet Take 1 tablet (150 mg total) by mouth daily 30 tablet 1   • sertraline (Zoloft) 25 mg tablet Take 1 tablet (25 mg total) by mouth daily With 50 mg 30 tablet 1   • sertraline (ZOLOFT) 50 mg tablet Take 1 tablet (50 mg total) by mouth daily 30 tablet 1     No current facility-administered medications for this visit  Not on File    Review of Systems    Video Exam    There were no vitals filed for this visit      Physical Exam     01/01/23  Start Time: 6959  Stop Time: 1600  Total Visit Time: 52 minutes

## 2023-01-05 ENCOUNTER — TELEMEDICINE (OUTPATIENT)
Dept: PSYCHIATRY | Facility: CLINIC | Age: 53
End: 2023-01-05

## 2023-01-05 DIAGNOSIS — F41.1 GENERALIZED ANXIETY DISORDER: ICD-10-CM

## 2023-01-05 DIAGNOSIS — F32.A DEPRESSION, UNSPECIFIED DEPRESSION TYPE: Primary | ICD-10-CM

## 2023-01-05 RX ORDER — SERTRALINE HYDROCHLORIDE 25 MG/1
25 TABLET, FILM COATED ORAL DAILY
Qty: 30 TABLET | Refills: 1 | Status: SHIPPED | OUTPATIENT
Start: 2023-01-05

## 2023-01-05 RX ORDER — BUPROPION HYDROCHLORIDE 100 MG/1
100 TABLET, EXTENDED RELEASE ORAL DAILY
Qty: 30 TABLET | Refills: 1 | Status: SHIPPED | OUTPATIENT
Start: 2023-01-05

## 2023-01-05 NOTE — PSYCH
Virtual Regular Visit    Verification of patient location:    Patient is located in the following state in which I hold an [place]      Assessment/Plan:    Problem List Items Addressed This Visit        Other    Depression - Primary    Relevant Medications    sertraline (ZOLOFT) 50 mg tablet    sertraline (Zoloft) 25 mg tablet    buPROPion (Wellbutrin SR) 100 mg 12 hr tablet   Other Visit Diagnoses     Generalized anxiety disorder        Relevant Medications    sertraline (ZOLOFT) 50 mg tablet    sertraline (Zoloft) 25 mg tablet    buPROPion (Wellbutrin SR) 100 mg 12 hr tablet          Goals addressed in session: Goal 1          Reason for visit is   Chief Complaint   Patient presents with   • Medication Management        Encounter provider Candy Mendosa MD    Provider located at [address] [facility]  [address]  [phone]      Recent Visits  No visits were found meeting these conditions  Showing recent visits within past 7 days and meeting all other requirements  Today's Visits  Date Type Provider Dept   01/05/23 Telemedicine Candy Mendosa, [facility] today's visits and meeting all other requirements  Future Appointments  No visits were found meeting these conditions  Showing future appointments within next 150 days and meeting all other requirements       The patient was identified by name and date of birth  Jennifer Bond was informed that this is a telemedicine visit and [facility]  She agrees to proceed     My office door was closed  No one else was in the room  She acknowledged consent and understanding of privacy and security of the video platform  The patient has agreed to participate and understands they can discontinue the visit at any time  Patient is aware this is a billable service       Subjective  Jennifer Bond is a 50 y o  female with depression and anxiety presents for regular f/u    Compliant with meds  SE - constipation, vivid dreams - improving; dreams are less disturbing  PCP visit - menopause blood work and w/u   Father  1 year ago      HPI   Mood - worse during holidays; missing her  father; crying, sad  Improving , better energy  Anxiety - continues to worry  - " manageable"    History reviewed  No pertinent past medical history  History reviewed  No pertinent surgical history  Current Outpatient Medications   Medication Sig Dispense Refill   • buPROPion (Wellbutrin SR) 100 mg 12 hr tablet Take 1 tablet (100 mg total) by mouth in the morning 30 tablet 1   • sertraline (Zoloft) 25 mg tablet Take 1 tablet (25 mg total) by mouth daily With 50 mg 30 tablet 1   • sertraline (ZOLOFT) 50 mg tablet Take 1 tablet (50 mg total) by mouth daily 30 tablet 1     No current facility-administered medications for this visit  Not on File    Review of Systems   Constitutional: Negative for activity change and appetite change  Psychiatric/Behavioral: Negative for sleep disturbance and suicidal ideas  The patient is nervous/anxious  Video Exam    There were no vitals filed for this visit  Physical Exam  Constitutional:       Appearance: Normal appearance  She is normal weight  Neurological:      Mental Status: She is alert  Psychiatric:         Attention and Perception: Attention and perception normal          Mood and Affect: Affect normal  Mood is depressed  Speech: Speech normal          Behavior: Behavior normal  Behavior is cooperative  Thought Content:  Thought content normal          Judgment: Judgment normal           Visit Time    Visit Start Time: 4 58 pm   Visit Stop Time: 5 05 pm   Total Visit Duration: 17 minutes

## 2023-01-12 ENCOUNTER — TELEMEDICINE (OUTPATIENT)
Dept: BEHAVIORAL/MENTAL HEALTH CLINIC | Facility: CLINIC | Age: 53
End: 2023-01-12

## 2023-01-12 DIAGNOSIS — F90.2 ATTENTION DEFICIT HYPERACTIVITY DISORDER, COMBINED TYPE: ICD-10-CM

## 2023-01-12 DIAGNOSIS — F33.1 MAJOR DEPRESSIVE DISORDER, RECURRENT EPISODE, MODERATE (HCC): Primary | ICD-10-CM

## 2023-01-12 NOTE — PSYCH
Virtual Regular Visit    Verification of patient location:    Patient is located in the following state in which I [place] an [place] license [place]      Assessment/Plan:    Problem List Items Addressed This Visit        Other    Major depressive disorder, recurrent episode, moderate ([address] ) - Primary    Attention deficit hyperactivity disorder, combined type       Goals addressed in session: Goal 1          Reason for visit is No chief complaint on file  Encounter provider MARIOLA Edwards    Provider located at [address] [place] [address]  [phone]      Recent Visits  No visits were found meeting these conditions  Showing recent visits within past 7 days and meeting all other requirements  Today's Visits  Date Type Provider Dept   01/12/23 Telemedicine Micheal Edwards[address] [facility] Therapist op   Showing today's visits and meeting all other requirements  Future Appointments  No visits were found meeting these conditions  Showing future appointments within next 150 days and meeting all other requirements       The patient was identified by name and date of birth  Sowmya Michelle was informed that this is a telemedicine visit and [facility] the [facility]  She agrees to proceed     My office door was closed  No one else was in the room  She acknowledged consent and understanding of privacy and security of the video platform  The patient has agreed to participate and understands they can discontinue the visit at any time  Patient is aware this is a billable service  Subjective  Sowmya Michelle is a 50 y o  female  DATA: Met with Charisse Lopez for scheduled individual session  Topics of discussion included family stressors, marital stress, parenting concerns, financial issues and mood regulation and symptoms   Client reported things have been "pretty good" since her last session  She discussed that her and her  had to deal with his ex-wife last week regarding parenting, which she reported was challenging  Therapist actively listened and provided support and empathy  Client reported recent financial issues causing additional stress  Therapist asked if she has had the discussion with her  that she has been wanting to have and she reported she has not found the right time  Client discussed her one year wedding anniversary is coming up on 1/22 and she plans to wait until after that  Client discussed she has been focusing on her responses to her ; therapist explored this with her  Client reported that 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 and is looking forward to it  Client shows evidence of utilizing weighing pros and cons, emotion regulation skills and effective communication skills skills to manage mental health symptoms  During this session, this clinician used the following therapeutic modalities: engagement strategies, supportive psychotherapy, CBT techniques and Motivational Interviewing  Clinician provided psychoeducation regarding use of [employer]   Jose Raulashwin Salgadoer ASSESSMENT: Renu Guerrero presents with a improved, less anxious, less depressed mood  Her affect is normal range and intensity, appropriate  Renu Guerrero exhibits strong therapeutic rapport with this clinician  Renu Guerrero continues to exhibit willingness to work on treatment goals and objectives  Renu Guerrero presents with a minimal risk of suicide, minimal risk of self-harm, and minimal risk of harm to others  PLAN: Renu Guerrero will return in 2 weeks for the next scheduled session  Between sessions, Renu Guerrero will continue to utilize positive coping skills to manage mood and will report back during the next session re: successes and barriers   At the next session, this clinician will use engagement strategies, client-centered therapy, CBT techniques and Motivational Interviewing to address symptoms and relationship concerns, in an effort to [address] [facility] [address] with meeting treatment goals  HPI     No past medical history on file  No past surgical history on file  Current Outpatient Medications   Medication Sig Dispense Refill   • buPROPion (Wellbutrin SR) 100 mg 12 hr tablet Take 1 tablet (100 mg total) by mouth in the morning 30 tablet 1   • sertraline (Zoloft) 25 mg tablet Take 1 tablet (25 mg total) by mouth daily With 50 mg 30 tablet 1   • sertraline (ZOLOFT) 50 mg tablet Take 1 tablet (50 mg total) by mouth daily 30 tablet 1     No current facility-administered medications for this visit  Not on File    Review of Systems    Video Exam    There were no vitals filed for this visit      Physical Exam     01/12/23  Start Time: 0803  Stop Time: 9971  Total Visit Time: 56 minutes

## 2023-01-30 ENCOUNTER — TELEMEDICINE (OUTPATIENT)
Dept: BEHAVIORAL/MENTAL HEALTH CLINIC | Facility: CLINIC | Age: 53
End: 2023-01-30

## 2023-01-30 DIAGNOSIS — F90.2 ATTENTION DEFICIT HYPERACTIVITY DISORDER, COMBINED TYPE: ICD-10-CM

## 2023-01-30 DIAGNOSIS — F33.1 MAJOR DEPRESSIVE DISORDER, RECURRENT EPISODE, MODERATE (HCC): Primary | ICD-10-CM

## 2023-01-30 NOTE — PSYCH
Virtual Regular Visit    Verification of patient location:    Patient is located in the following state in which I hold an active license [place]             Reason for visit is   Chief Complaint   Patient presents with   • Virtual Regular Visit        Encounter provider MARIOLA Vidal    Provider located at [address] [place] [address] [address] [place] [address]  [phone]      Recent Visits  No visits were found meeting these conditions  Showing recent visits within past 7 days and meeting all other requirements  Today's Visits  Date Type Provider Dept   01/30/23 Telemedicine Altaf Cook, [address] [facility] Therapist [facility]   Showing today's visits and meeting all other requirements  Future Appointments  No visits were found meeting these conditions  Showing future appointments within next 150 days and meeting all other requirements       The patient was identified by name and date of birth  Dillan Guerra was informed that this is a telemedicine visit and [facility]  She agrees to proceed     My office door was closed  No one else was in the room  She acknowledged consent and understanding of privacy and security of the video platform  The patient has agreed to participate and understands they can discontinue the visit at any time  Patient is aware this is a billable service  Subjective  Dillan Guerra is a 50 y o  female   HPI     No past medical history on file  No past surgical history on file      Current Outpatient Medications   Medication Sig Dispense Refill   • buPROPion (Wellbutrin SR) 100 mg 12 hr tablet Take 1 tablet (100 mg total) by mouth in the morning 30 tablet 1   • sertraline (Zoloft) 25 mg tablet Take 1 tablet (25 mg total) by mouth daily With 50 mg 30 tablet 1   • sertraline (ZOLOFT) 50 mg tablet Take 1 tablet (50 mg total) by mouth daily 30 tablet 1     No current facility-administered medications for this visit  Not on File    Review of Systems    Video Exam    There were no vitals filed for this visit  Physical Exam     Behavioral Health Psychotherapy Progress Note    Psychotherapy Provided: Individual Psychotherapy     1  Major depressive disorder, recurrent episode, moderate ([facility] [address] )        2  Attention deficit hyperactivity disorder, combined type            Goals addressed in session: Goal 1     DATA:  Client reported having a good week  She reported she has been tapering off of her Wellbutrin  She discussed that she started her menstrual cycle for the first time since July and was unsure if she was starting to go through menopause  Client reported that she has been having headaches occasionally in the morning and feels nausionus and vomiting, however later realized that this could be due to forgetting to take her medication in the morning  Therapist encouraged for client to reach out to her provider for additional support regarding this and the medications  Client discussed continued stress in her relationship with her , however reported that she feels there have been improvements  She reported that she still feels like she does not contribute enough financially and would like to work on budgeting with her   During this session, this clinician used the following therapeutic modalities: Motivational Interviewing and Supportive Psychotherapy    Substance Abuse was not addressed during this session  If the client is diagnosed with a co-occurring substance use disorder, please indicate any changes in the frequency or amount of use: n/a  Stage of change for addressing substance use diagnoses: No substance use/Not applicable    ASSESSMENT:  Birgit Terry presents with a Euthymic/ normal mood  her affect is Normal range and intensity, which is congruent, with her mood and the content of the session   The client has made progress on their goals  Client openly engaged throughout the session  Client was oriented x3 and maintained good eye contact and focus throughout  Blaire Garcia presents with a minimal risk of suicide, minimal risk of self-harm, and minimal risk of harm to others  For any risk assessment that surpasses a "low" rating, a safety plan must be developed  A safety plan was indicated: no  If yes, describe in detail n/a    PLAN: Between sessions, Blaire Garcia will continue to utilize positive coping skills to manage mood and work on finding another job  At the next session, the therapist will use Cognitive Processing Therapy, Solution-Focused Therapy and Supportive Psychotherapy to address symptoms and concerns  Behavioral Health Treatment Plan and Discharge Planning: Blaire Garcia is aware of and agrees to continue to work on their treatment plan  They have identified and are working toward their discharge goals   yes    Visit start and stop times:    01/30/23  Start Time: 1409  Stop Time: 1457  Total Visit Time: 48 minutes

## 2023-02-09 ENCOUNTER — TELEMEDICINE (OUTPATIENT)
Dept: BEHAVIORAL/MENTAL HEALTH CLINIC | Facility: CLINIC | Age: 53
End: 2023-02-09

## 2023-02-09 DIAGNOSIS — F90.2 ATTENTION DEFICIT HYPERACTIVITY DISORDER, COMBINED TYPE: ICD-10-CM

## 2023-02-09 DIAGNOSIS — F33.1 MAJOR DEPRESSIVE DISORDER, RECURRENT EPISODE, MODERATE (HCC): Primary | ICD-10-CM

## 2023-02-09 NOTE — PSYCH
Virtual Regular Visit    Verification of patient location:    Patient is located in the following state in which I hold an active license [place]      Assessment/Plan:    Problem List Items Addressed This Visit        Other    Major depressive disorder, recurrent episode, moderate ([address] ) - Primary    Attention deficit hyperactivity disorder, combined type       Goals addressed in session: Goal 1          Reason for visit is   Chief Complaint   Patient presents with   • Virtual Regular Visit        Encounter provider MARIOLA Christianson    Provider located at [address] [place] [id number] [phone]      Recent Visits  No visits were found meeting these conditions  Showing recent visits within past 7 days and meeting all other requirements  Today's Visits  Date Type Provider Dept   02/09/23 Telemedicine Micheal Christianson[facility] Therapist op   Showing today's visits and meeting all other requirements  Future Appointments  No visits were found meeting these conditions  Showing future appointments within next 150 days and meeting all other requirements       The patient was identified by name and date of birth  Jennifer Martinez was informed that this is a telemedicine visit and that [facility]  She agrees to proceed     My office door was closed  No one else was in the room  She acknowledged consent and understanding of privacy and security of the video platform  The patient has agreed to participate and understands they can discontinue the visit at any time  Patient is aware this is a billable service  Subjective  Jennifer Martinez is a 50 y o  female   HPI     No past medical history on file  No past surgical history on file      Current Outpatient Medications   Medication Sig Dispense Refill   • buPROPion (Wellbutrin SR) 100 mg 12 hr tablet Take 1 tablet (100 mg total) by mouth in the morning 30 tablet 1   • sertraline (Zoloft) 25 mg tablet Take 1 tablet (25 mg total) by mouth daily With 50 mg 30 tablet 1   • sertraline (ZOLOFT) 50 mg tablet Take 1 tablet (50 mg total) by mouth daily 30 tablet 1     No current facility-administered medications for this visit  Not on File    Review of Systems    Video Exam    There were no vitals filed for this visit  Physical Exam     Behavioral Health Psychotherapy Progress Note    Psychotherapy Provided: Individual Psychotherapy     1  Major depressive disorder, recurrent episode, moderate ([facility] [address] )        2  Attention deficit hyperactivity disorder, combined type            Goals addressed in session: Goal 1     DATA: Margo reported her 25years old son has been dealing with depression and suicidal ideation with no intent or plan the past few weeks  Client discussed that she has taken him to the emergency room twice and the urgent care twice to obtain assistance as he has been worried about physical symptoms present related to anxiety  Client reported that her son was able to meet with a psychiatrist last week  Patient reported taking her son to the crisis care at [facility] and they recommended a partial program for her son  Client discussed that her son was able to go to school yesterday and today and she is hopeful that his mood has improved  Client discussed feeling tired and "wipped out" from trying to help her son and possibly due to changes in her medication  Therapist actively listened to client and provided support and empathy  Therapist informed client that she was leaving the practice and processed this with her  Client in agreement to be transferred to a new therapist in the practice       During this session, this clinician used the following therapeutic modalities: Cognitive Processing Therapy and Supportive Psychotherapy    Substance Abuse was not addressed during this session  If the client is diagnosed with a co-occurring substance use disorder, please indicate any changes in the frequency or amount of use: n/a  Stage of change for addressing substance use diagnoses: No substance use/Not applicable    ASSESSMENT:  Sean Miles presents with a Depressed mood  her affect is Normal range and intensity, which is congruent, with her mood and the content of the session  The client has made progress on their goals  Client was open and engaged throughout the session  Client was oriented x3  Client was focused and maintained good eye contact  Sean Miles presents with a minimal risk of suicide, minimal risk of self-harm, and minimal risk of harm to others  For any risk assessment that surpasses a "low" rating, a safety plan must be developed  A safety plan was indicated: no  If yes, describe in detail n/a    PLAN: Between sessions, Sean Miles will continue to utilize positive coping skills to manage mood  At the next session, the therapist will use Cognitive Processing Therapy to address symptoms and process transitioning to a new therapist as current therapist is leaving the practice  Behavioral Health Treatment Plan and Discharge Planning: Sean Miles is aware of and agrees to continue to work on their treatment plan  They have identified and are working toward their discharge goals   yes    Visit start and stop times:    02/09/23  Start Time: 0147  Stop Time: 1602  Total Visit Time: 54 minutes

## 2023-02-16 ENCOUNTER — DOCUMENTATION (OUTPATIENT)
Dept: BEHAVIORAL/MENTAL HEALTH CLINIC | Facility: CLINIC | Age: 53
End: 2023-02-16

## 2023-02-16 ENCOUNTER — TELEMEDICINE (OUTPATIENT)
Dept: PSYCHIATRY | Facility: CLINIC | Age: 53
End: 2023-02-16

## 2023-02-16 DIAGNOSIS — F32.A DEPRESSION, UNSPECIFIED DEPRESSION TYPE: Primary | ICD-10-CM

## 2023-02-16 DIAGNOSIS — F33.1 MAJOR DEPRESSIVE DISORDER, RECURRENT EPISODE, MODERATE (HCC): Primary | ICD-10-CM

## 2023-02-16 DIAGNOSIS — F41.1 GENERALIZED ANXIETY DISORDER: ICD-10-CM

## 2023-02-16 DIAGNOSIS — F90.2 ATTENTION DEFICIT HYPERACTIVITY DISORDER, COMBINED TYPE: ICD-10-CM

## 2023-02-16 RX ORDER — BUPROPION HYDROCHLORIDE 100 MG/1
100 TABLET, EXTENDED RELEASE ORAL DAILY
Qty: 10 TABLET | Refills: 0 | Status: SHIPPED | OUTPATIENT
Start: 2023-02-16

## 2023-02-16 RX ORDER — SERTRALINE HYDROCHLORIDE 25 MG/1
25 TABLET, FILM COATED ORAL DAILY
Qty: 30 TABLET | Refills: 1 | Status: SHIPPED | OUTPATIENT
Start: 2023-02-16

## 2023-02-16 NOTE — PROGRESS NOTES
[facility]    Patient Name Mekhi Kwan     Date of Birth: 50 y o  1970      MRN: [id number]    Admission Date: 09/7/2021    Date of Transfer: February 16, 2023    Admission Diagnosis:     1  Major Depressive Disorder, recurrent, moderate  2  ADHD, Unspecified Type    Current Diagnosis:     1  Major depressive disorder, recurrent episode, moderate ([address] )        2  Attention deficit hyperactivity disorder, combined type            Reason for Admission: Yumiko Cantu presented for treatment due to depressive symptoms  Primary complaints included DEPRESSIVE SYMPTOMS: depressed mood, low energy, low motivation, excessive guilt, difficulty with decision making, poor concentration, negative thoughts and ADHD SYMPTOMS: poor concentration, decreased attention span, hyperactivity  Progress in Treatment: Yumiko Cantu was seen for Medication Management with Psychotherapy  During the course of treatment she was making progress managing her depressive symptoms and started working on improving her communication in her relationship with her   Episodes of Higher Level of Care: No    Transfer request Initiated by: Psychiatrist: None Therapist: MARIOLA Sigala    Reason for Transfer Request: clinician leaving practice    Does this individual need a clinician with specialized training/expertise?: No    Is this client working with any other [facility] Providers/Therapists?  Psychiatrist: None Therapist: None    Other pertinent issues: relationship issues with     Are there any specific individuals who would be a “best fit” or who have already agreed to accept this transfer request?      Psychiatrist: None   Therapist: Garland Doing  Rationale: Patient prefers female clinician    Attempts to maintain the current therapeutic relationship: Yes, but therpaist leaving the practice    Transfer request routed to Clinical Coordinator for input and/or approval      Comments from other involved providers and/or clinical coordinator: None    Cherelle Swanson, LSW02/16/23

## 2023-02-16 NOTE — PSYCH
Virtual Regular Visit    Verification of patient location:    Patient is located in the following state in which I hold an active license [place]      Assessment/Plan:    Problem List Items Addressed This Visit        Other    Depression - Primary    Relevant Medications    sertraline (ZOLOFT) 50 mg tablet    sertraline (Zoloft) 25 mg tablet    buPROPion (Wellbutrin SR) 100 mg 12 hr tablet   Other Visit Diagnoses     Generalized anxiety disorder        Relevant Medications    sertraline (ZOLOFT) 50 mg tablet    sertraline (Zoloft) 25 mg tablet    buPROPion (Wellbutrin SR) 100 mg 12 hr tablet          Goals addressed in session: Goal 1          Reason for visit is   Chief Complaint   Patient presents with   • Medication Management        Encounter provider Ele Hernandez MD    Provider located at [address] [facility]  [address]  [phone]      Recent Visits  No visits were found meeting these conditions  Showing recent visits within past 7 days and meeting all other requirements  Today's Visits  Date Type Provider Dept   02/16/23 Telemedicine Ele Hernandez, [address] [address] today's visits and meeting all other requirements  Future Appointments  No visits were found meeting these conditions  Showing future appointments within next 150 days and meeting all other requirements       The patient was identified by name and date of birth  Jovon Juana was informed that this is a telemedicine visit and that the [facility]  She agrees to proceed     My office door was closed  No one else was in the room  She acknowledged consent and understanding of privacy and security of the video platform  The patient has agreed to participate and understands they can discontinue the visit at any time  Patient is aware this is a billable service       Subjective  Jovon Arias is a 50 y o  female with depression and anxiety presents for regular f/u    Taper down wellbutrin to 50 mg   SE - improving - no constipation, less vivid dreams  Menopausal w/u; got her period last month  Son with medical problems      HPI   Mood - low energy ; feeling tired  Less depressed; denies mood swings  Anxiety - controlled  History reviewed  No pertinent past medical history  History reviewed  No pertinent surgical history  Current Outpatient Medications   Medication Sig Dispense Refill   • buPROPion (Wellbutrin SR) 100 mg 12 hr tablet Take 1 tablet (100 mg total) by mouth in the morning 10 tablet 0   • sertraline (Zoloft) 25 mg tablet Take 1 tablet (25 mg total) by mouth daily With 50 mg 30 tablet 1   • sertraline (ZOLOFT) 50 mg tablet Take 1 tablet (50 mg total) by mouth daily 30 tablet 1     No current facility-administered medications for this visit  Not on File    Review of Systems   Constitutional: Negative for activity change and appetite change  Psychiatric/Behavioral: Negative for dysphoric mood, sleep disturbance and suicidal ideas  The patient is not nervous/anxious  Video Exam    There were no vitals filed for this visit  Physical Exam  Constitutional:       Appearance: Normal appearance  She is normal weight  Neurological:      Mental Status: She is alert  Psychiatric:         Attention and Perception: Attention and perception normal          Mood and Affect: Mood and affect normal          Speech: Speech normal          Behavior: Behavior normal  Behavior is cooperative  Thought Content:  Thought content normal          Judgment: Judgment normal           Visit Time    Visit Start Time: 4 17 pm   Visit Stop Time: 4 27 pm   Total Visit Duration: 20 minutes

## 2023-02-17 ENCOUNTER — TELEPHONE (OUTPATIENT)
Dept: BEHAVIORAL/MENTAL HEALTH CLINIC | Facility: CLINIC | Age: 53
End: 2023-02-17

## 2023-02-17 ENCOUNTER — TELEMEDICINE (OUTPATIENT)
Dept: BEHAVIORAL/MENTAL HEALTH CLINIC | Facility: CLINIC | Age: 53
End: 2023-02-17

## 2023-02-17 DIAGNOSIS — F33.1 MAJOR DEPRESSIVE DISORDER, RECURRENT EPISODE, MODERATE (HCC): Primary | ICD-10-CM

## 2023-02-17 DIAGNOSIS — F90.2 ATTENTION DEFICIT HYPERACTIVITY DISORDER, COMBINED TYPE: ICD-10-CM

## 2023-02-17 NOTE — PSYCH
Virtual Regular Visit    Verification of patient location:    Patient is located in the following state in which I hold an active license [place]             Reason for visit is   Chief Complaint   Patient presents with   • Virtual Regular Visit        Encounter provider MARIOLA Christianson    Provider located at [address] [place] [address]  [phone]      Recent Visits  No visits were found meeting these conditions  Showing recent visits within past 7 days and meeting all other requirements  Today's Visits  Date Type Provider Dept   02/17/23 Telephone Terri Walsh [address] Therapist [facility]   02/17/23 Telemedicine Terri Walsh, [address] Therapist [facility]   Showing today's visits and meeting all other requirements  Future Appointments  No visits were found meeting these conditions  Showing future appointments within next 150 days and meeting all other requirements       The patient was identified by name and date of birth  Jennifer Martinez was informed that this is a telemedicine visit and that [facility]  She agrees to proceed     My office door was closed  No one else was in the room  She acknowledged consent and understanding of privacy and security of the video platform  The patient has agreed to participate and understands they can discontinue the visit at any time  Patient is aware this is a billable service  Subjective  Jennifer Martinez is a 50 y o  female   HPI     No past medical history on file  No past surgical history on file      Current Outpatient Medications   Medication Sig Dispense Refill   • buPROPion (Wellbutrin SR) 100 mg 12 hr tablet Take 1 tablet (100 mg total) by mouth in the morning 10 tablet 0   • sertraline (Zoloft) 25 mg tablet Take 1 tablet (25 mg total) by mouth daily With 50 mg 30 tablet 1   • sertraline (ZOLOFT) 50 mg tablet Take 1 tablet (50 mg total) by mouth daily 30 tablet 1     No current facility-administered medications for this visit  Not on File    Review of Systems    Video Exam    There were no vitals filed for this visit  Physical Exam     Behavioral Health Psychotherapy Progress Note    Psychotherapy Provided: Individual Psychotherapy     1  Major depressive disorder, recurrent episode, moderate ([address] )        2  Attention deficit hyperactivity disorder, combined type            Goals addressed in session: Goal 1 and Goal 2     DATA: Client reported that her son has been feeling better and has been connected with individual therapy and medication management  She reported that she has not had to take him to the emergency room since the beginning of last week  Client reported that she has been pushing herself to do more things around the house  Client reported that she noticed she has been forgetting things more and has been loosing focus more often throughout the day  Therapist proposed using a habit tracker to help stay focused and prioritize her time  Client reported her job is changing a little and discussed her position is becoming more important for the Doist planning events  Client reported still dealing with stress that she is not doing enough to contribute to the family  During this session, this clinician used the following therapeutic modalities: Cognitive Processing Therapy and Supportive Psychotherapy    Substance Abuse was not addressed during this session  If the client is diagnosed with a co-occurring substance use disorder, please indicate any changes in the frequency or amount of use: n/a  Stage of change for addressing substance use diagnoses: No substance use/Not applicable    ASSESSMENT:  Roseanne Lawson presents with a Euthymic/ normal mood       her affect is Normal range and intensity, which is congruent, with her mood and the content of the session  The client has made progress on their goals  Client openly engaged in session  Client was oriented x3 and maintained good eye contact  Antonio Pollock presents with a low risk of suicide, low risk of self-harm, and low risk of harm to others  For any risk assessment that surpasses a "low" rating, a safety plan must be developed  A safety plan was indicated: no  If yes, describe in detail n/a    PLAN: Between sessions, Antonio Pollock will continue to take medication as prescribed and utilize positive coping skills to manage mood  At the next session, the therapist will use Client-centered Therapy and Solution-Focused Therapy to address symptoms and relationship concerns  Behavioral Health Treatment Plan and Discharge Planning: Antonio Pollock is aware of and agrees to continue to work on their treatment plan  They have identified and are working toward their discharge goals   yes    Visit start and stop times:    02/17/23  Start Time: 0905  Stop Time: 0957  Total Visit Time: 52 minutes

## 2023-02-24 ENCOUNTER — TELEMEDICINE (OUTPATIENT)
Dept: BEHAVIORAL/MENTAL HEALTH CLINIC | Facility: CLINIC | Age: 53
End: 2023-02-24

## 2023-02-24 DIAGNOSIS — F33.1 MAJOR DEPRESSIVE DISORDER, RECURRENT EPISODE, MODERATE (HCC): Primary | ICD-10-CM

## 2023-02-24 DIAGNOSIS — F90.2 ATTENTION DEFICIT HYPERACTIVITY DISORDER, COMBINED TYPE: ICD-10-CM

## 2023-02-24 NOTE — PSYCH
Pt is dx with lupus, pt here today due to bilateral lower leg and feet  pain and swelling for past 7 days. Pt stated that the pain and swelling \"comes and goes\". Pt unable to bear weight on feet.  Pt also stated she vomited yesterday x2 and today x1. Pt  stated that she spoke to her primary doctor who may call into ER doctors here.    Virtual Regular Visit    Verification of patient location:    Patient is located in the following state in which I hold an active license [place]           Reason for visit is   Chief Complaint   Patient presents with   • Virtual Regular Visit        Encounter provider MARIOLA Cummings    Provider located at [address] [place] [address]  [phone]      Recent Visits  Date Type Provider Dept   02/17/23 Telephone Micheal Cummings[address] Therapist [facility]   02/17/23 Telemedicine Micheal Cummings[address] Therapist [facility]   Showing recent visits within past 7 days and meeting all other requirements  Today's Visits  Date Type Provider Dept   02/24/23 Telemedicine Micheal Cummings[address] Therapist [facility]   Showing today's visits and meeting all other requirements  Future Appointments  No visits were found meeting these conditions  Showing future appointments within next 150 days and meeting all other requirements       The patient was identified by name and date of birth  Jean-Claude Alexander was informed that this is a telemedicine visit and that the [facility]  She agrees to proceed     My office door was closed  No one else was in the room  She acknowledged consent and understanding of privacy and security of the video platform  The patient has agreed to participate and understands they can discontinue the visit at any time  Patient is aware this is a billable service  Subjective  Jean-Claude Alexander is a 50 y o  female    HPI     No past medical history on file  No past surgical history on file      Current Outpatient Medications   Medication Sig Dispense Refill   • buPROPion (Wellbutrin SR) 100 mg 12 hr tablet Take 1 tablet (100 mg total) by mouth in the morning 10 tablet 0   • sertraline (Zoloft) 25 mg tablet Take 1 tablet (25 mg total) by mouth daily With 50 mg 30 tablet 1   • sertraline (ZOLOFT) 50 mg tablet Take 1 tablet (50 mg total) by mouth daily 30 tablet 1     No current facility-administered medications for this visit  Not on File    Review of Systems    Video Exam    There were no vitals filed for this visit  Physical Exam     Behavioral Health Psychotherapy Progress Note    Psychotherapy Provided: Individual Psychotherapy     1  Major depressive disorder, recurrent episode, moderate ([facility] [address] )        2  Attention deficit hyperactivity disorder, combined type            Goals addressed in session: Goal 1     DATA: Client reported maintained mood since previous session  Client reported her son has been doing well and his depressive symptoms have been more manageable, which has been a relief for client  Client discussed continued communication break-downs between her and her  and reported working through them with him  Therapist praised client for her hard work and provided support  Client acknowledged that she has not been having the same fun with her  as she had in the past and expressed how that makes her feel  Client acknowledged that she is afraid of bringing something up with her  as he takes things personally and responds as he had in his past marriage  Client reported that she has wanted to bring up with her  her concern about one of the women that he follows and how this makes her feel regarding self-esteem, however she has not spoken to him about it  During this session, this clinician used the following therapeutic modalities: Cognitive Processing Therapy, Solution-Focused Therapy and Supportive Psychotherapy    Substance Abuse was not addressed during this session  If the client is diagnosed with a co-occurring substance use disorder, please indicate any changes in the frequency or amount of use: n/a   Stage of change for addressing substance use diagnoses: No substance use/Not applicable    ASSESSMENT:  Ellyn Carter presents with a Euthymic/ normal mood  her affect is Normal range and intensity, which is congruent, with her mood and the content of the session  The client has made progress on their goals  Client was openly engaged throughout the session  Cl Ellyn Carter presents with a minimal risk of suicide, minimal risk of self-harm, and minimal risk of harm to others  For any risk assessment that surpasses a "low" rating, a safety plan must be developed  A safety plan was indicated: no  If yes, describe in detail Therapist updated a safety/crisis plan with Ellyn Carter in [employer]  Patient engaged and participated in creation of the plan  Patient denied current SI/HI/self-harm and was able to contract for safety  Therapist provided patient with her [facility] phone number and advised if symptoms arise to call [facility], 911, or go to the nearest emergency room; patient in agreement  PLAN: Between sessions, Ellyn Carter will continue to focus on self-love, motivation and look into utilizing a habit tracker  At the next session, the therapist will use Cognitive Processing Therapy and Supportive Psychotherapy to address symptoms and transfer of care  Behavioral Health Treatment Plan and Discharge Planning: Ellyn Carter is aware of and agrees to continue to work on their treatment plan  They have identified and are working toward their discharge goals   yes    Visit start and stop times:    02/24/23  Start Time: 0805  Stop Time: 0906  Total Visit Time: 61 minutes

## 2023-03-01 ENCOUNTER — TELEMEDICINE (OUTPATIENT)
Dept: BEHAVIORAL/MENTAL HEALTH CLINIC | Facility: CLINIC | Age: 53
End: 2023-03-01

## 2023-03-01 ENCOUNTER — TELEPHONE (OUTPATIENT)
Dept: BEHAVIORAL/MENTAL HEALTH CLINIC | Facility: CLINIC | Age: 53
End: 2023-03-01

## 2023-03-01 DIAGNOSIS — F33.1 MAJOR DEPRESSIVE DISORDER, RECURRENT EPISODE, MODERATE (HCC): Primary | ICD-10-CM

## 2023-03-01 DIAGNOSIS — F90.2 ATTENTION DEFICIT HYPERACTIVITY DISORDER, COMBINED TYPE: ICD-10-CM

## 2023-03-01 NOTE — TELEPHONE ENCOUNTER
ANDRE contacted client to verify if she would be joining the session of if she needed assistance  Client reported she would be joining shortly

## 2023-03-01 NOTE — PSYCH
Virtual Regular Visit    Verification of patient location:    Patient is located in the following state in which I hold an active license [place]      Assessment/Plan:    Problem List Items Addressed This Visit        Other    Major depressive disorder, recurrent episode, moderate ([address] ) - Primary    Attention deficit hyperactivity disorder, combined type       Goals addressed in session: Goal 1          Reason for visit is   Chief Complaint   Patient presents with   • Virtual Regular Visit        Encounter provider Joice Moritz, LSW    Provider located at [address] [place] [id number] [phone]      Recent Visits  Date Type Provider Dept   02/24/23 [address], [address] Therapist [facility]   Showing recent visits within past 7 days and meeting all other requirements  Today's Visits  Date Type Provider Dept   03/01/23 Telephone Joice Moritz, [address] Therap[facility]   03/01/23 Telemedicine Joice Moritz, [address] Therap[facility]   Showing today's visits and meeting all other requirements  Future Appointments  No visits were found meeting these conditions  Showing future appointments within next 150 days and meeting all other requirements       The patient was identified by name and date of birth  Pooja Vallejo was informed that this is a telemedicine visit and that [facility]  She agrees to proceed     My office door was closed  No one else was in the room  She acknowledged consent and understanding of privacy and security of the video platform  The patient has agreed to participate and understands they can discontinue the visit at any time  Patient is aware this is a billable service  Subjective  Pooja Vallejo is a 50 y o  female    HPI     No past medical history on file      No past surgical history on file  Current Outpatient Medications   Medication Sig Dispense Refill   • buPROPion (Wellbutrin SR) 100 mg 12 hr tablet Take 1 tablet (100 mg total) by mouth in the morning 10 tablet 0   • sertraline (Zoloft) 25 mg tablet Take 1 tablet (25 mg total) by mouth daily With 50 mg 30 tablet 1   • sertraline (ZOLOFT) 50 mg tablet Take 1 tablet (50 mg total) by mouth daily 30 tablet 1     No current facility-administered medications for this visit  Not on File    Review of Systems    Video Exam    There were no vitals filed for this visit  Physical Exam     Behavioral Health Psychotherapy Progress Note    Psychotherapy Provided: Individual Psychotherapy     1  Major depressive disorder, recurrent episode, moderate ([facility] [address] )        2  Attention deficit hyperactivity disorder, combined type            Goals addressed in session: Goal 1     DATA: Client reported having a good week  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  She reported having an event on Saturday as well  Client discussed continued financial struggles  Client reported that her niece might be having her baby today and expressed that her current living situation is not great and she is concerned for her  Client reported her cousin was diagnosed with cancer and is placed on hospice  Client reported her son has been managing his mental health better  Therapist provided support  Client reported that she has still been struggling with self image issues and how she feels about herself  Therapist explored this with her and provided support  During this session, this clinician used the following therapeutic modalities: Client-centered Therapy, Cognitive Processing Therapy, Solution-Focused Therapy and Supportive Psychotherapy    Substance Abuse was not addressed during this session   If the client is diagnosed with a co-occurring substance use disorder, please indicate any changes in the frequency or amount of use: n/a  Stage of change for addressing substance use diagnoses: No substance use/Not applicable    ASSESSMENT:  Sean Miles presents with a Euthymic/ normal mood  her affect is Normal range and intensity and Tearful, which is congruent, with her mood and the content of the session  The client has made progress on their goals  Client was open and engaged throughout the session  Client was oriented x3 and maintained good eye contact  Sean Miles presents with a none risk of suicide, none risk of self-harm, and minimal risk of harm to others  For any risk assessment that surpasses a "low" rating, a safety plan must be developed  A safety plan was indicated: no  If yes, describe in detail n/a    PLAN: Between sessions, Sean Miles will continue to utilize positive coping skills and engage in more self care  Client will be transferred to a new therapist as current therapist is leaving the practice  Behavioral Health Treatment Plan and Discharge Planning: Sean Miles is aware of and agrees to continue to work on their treatment plan  They have identified and are working toward their discharge goals   no    Visit start and stop times:    03/01/23  Start Time: 0910  Stop Time: 1003  Total Visit Time: 53 minutes

## 2023-03-15 ENCOUNTER — TELEPHONE (OUTPATIENT)
Dept: PSYCHIATRY | Facility: CLINIC | Age: 53
End: 2023-03-15

## 2023-03-16 ENCOUNTER — TELEMEDICINE (OUTPATIENT)
Dept: BEHAVIORAL/MENTAL HEALTH CLINIC | Facility: CLINIC | Age: 53
End: 2023-03-16

## 2023-03-16 ENCOUNTER — TELEPHONE (OUTPATIENT)
Dept: BEHAVIORAL/MENTAL HEALTH CLINIC | Facility: CLINIC | Age: 53
End: 2023-03-16

## 2023-03-16 DIAGNOSIS — F41.1 GENERALIZED ANXIETY DISORDER: Primary | ICD-10-CM

## 2023-03-16 NOTE — TELEPHONE ENCOUNTER
Client no-showed for scheduled telehealth appointment  Clinician called to inquire  Left voicemail requesting a callback

## 2023-03-16 NOTE — PSYCH
No Call  No Show  No Charge    Doc Lyn no showed 03/16/23 appointment , staff called and left message to reschedule appointment     Treatment Plan not due at this session

## 2023-03-22 ENCOUNTER — TELEMEDICINE (OUTPATIENT)
Dept: BEHAVIORAL/MENTAL HEALTH CLINIC | Facility: CLINIC | Age: 53
End: 2023-03-22

## 2023-03-22 DIAGNOSIS — F41.1 GENERALIZED ANXIETY DISORDER: ICD-10-CM

## 2023-03-22 DIAGNOSIS — F90.1 ADHD, PREDOMINANTLY HYPERACTIVE TYPE: Primary | ICD-10-CM

## 2023-03-22 NOTE — PSYCH
Virtual Regular Visit    Verification of patient location:    Patient is located in the following state in which I hold an active license Other; [place] working towards [place] lic[place]       Assessment/Plan:    Problem List Items Addressed This Visit    None  Visit Diagnoses     ADHD, predominantly hyperactive type    -  Primary    Generalized anxiety disorder              Goals addressed in session: Goal 1          Reason for visit is   Chief Complaint   Patient presents with   • Virtual Regular Visit        Encounter provider Radha Vidales    Provider located at [address] [place] [address]  [phone]      Recent Visits  Date Type Provider Dept   03/16/23 Telephone [place] [facility]   Showing recent visits within past 7 days and meeting all other requirements  Today's Visits  Date Type Provider Dept   03/22/23 Telemedicine [place] [facility]   Showing today's visits and meeting all other requirements  Future Appointments  No visits were found meeting these conditions  Showing future appointments within next 150 days and meeting all other requirements       The patient was identified by name and date of birth  Tara Stern was informed that this is a telemedicine visit and that the [facility]  She agrees to proceed     My office door was closed  No one else was in the room  She acknowledged consent and understanding of privacy and security of the video platform  The patient has agreed to participate and understands they can discontinue the visit at any time  Patient is aware this is a billable service  Subjective  Tara Stern is a 50 y o  female   HPI     No past medical history on file  No past surgical history on file      Current Outpatient Medications   Medication Sig Dispense Refill   • buPROPion (Wellbutrin SR) 100 mg 12 hr tablet Take 1 tablet (100 mg total) by mouth in the morning 10 tablet 0   • sertraline (Zoloft) 25 mg tablet Take 1 tablet (25 mg total) by mouth daily With 50 mg 30 tablet 1   • sertraline (ZOLOFT) 50 mg tablet Take 1 tablet (50 mg total) by mouth daily 30 tablet 1     No current facility-administered medications for this visit  Not on File    Review of Systems    Video Exam    There were no vitals filed for this visit  Physical Exam     Behavioral Health Psychotherapy Progress Note    Psychotherapy Provided: Individual Psychotherapy     1  ADHD, predominantly hyperactive type        2  Generalized anxiety disorder            Goals addressed in session: Goal 1     DATA: Client presented for a telehealth session via [employer] [address]  Client is a transfer from another clinician who left practice  Client reported that she has had several therapist prior and how she has been working on self-esteem, communication with her , processing the loss of her father and establishing coping skills to manage anxiety and her ADHD diagnosis  Client talked in-length about her past marriage, divorce, remarrying and navigating communication with each other  Client disclosed having three children, being cordial with their father, and how she had an affair during the time she was with her previous  (now ex-)  Client would like to work on bettering her communication, stating that she had wanted to look into couples counseling; thinking this would benefit their relationship  Client is still open to couples counseling  During this session, this clinician used the following therapeutic modalities: Client-centered Therapy and Cognitive Behavioral Therapy    Substance Abuse was not addressed during this session  If the client is diagnosed with a co-occurring substance use disorder, please indicate any changes in the frequency or amount of use: N/A   Stage of change for addressing substance use diagnoses: No substance use/Not applicable    ASSESSMENT:  Arlin Coleman presents with a Euthymic/ normal mood  her affect is Normal range and intensity, which is congruent, with her mood and the content of the session  The client has made progress on their goals  Arlin Coleman presents with a low risk of suicide, low risk of self-harm, and low risk of harm to others  For any risk assessment that surpasses a "low" rating, a safety plan must be developed  A safety plan was indicated: no  If yes, describe in detail N/A    PLAN: Between sessions, Arlin Coleman will review communication handout that will be sent by e-mail  Will discuss next session  At the next session, the therapist will use Client-centered Therapy and Cognitive Behavioral Therapy to address anxiety related to communication with   Behavioral Health Treatment Plan and Discharge Planning: Arlin Coleman is aware of and agrees to continue to work on their treatment plan  They have identified and are working toward their discharge goals   yes    Visit start and stop times:    03/22/23  Start Time: 0800  Stop Time: 0858  Total Visit Time: 58 minutes

## 2023-03-27 ENCOUNTER — TELEMEDICINE (OUTPATIENT)
Dept: PSYCHIATRY | Facility: CLINIC | Age: 53
End: 2023-03-27

## 2023-03-27 DIAGNOSIS — F32.A DEPRESSION, UNSPECIFIED DEPRESSION TYPE: Primary | ICD-10-CM

## 2023-03-27 DIAGNOSIS — F41.1 GENERALIZED ANXIETY DISORDER: ICD-10-CM

## 2023-03-27 NOTE — PATIENT INSTRUCTIONS
Continue zoloft 75 mg   Pt is interested in managing ADHD with coping skills  Declined to restart wellbutrin

## 2023-03-27 NOTE — PSYCH
Virtual Regular Visit    Verification of patient location:    Patient is located in the following state in which I hold an active license [place]      Assessment/Plan:    Problem List Items Addressed This Visit    None      Goals addressed in session: Goal 1          Reason for visit is   Chief Complaint   Patient presents with   • Medication Management        Encounter provider Shahram Adorno MD    Provider located at [address] [facility] [address]  [facility]  [address]  [phone]      Recent Visits  No visits were found meeting these conditions  Showing recent visits within past 7 days and meeting all other requirements  Today's Visits  Date Type Provider Dept   03/27/23 Telemedicine Shahram Adorno, [facility] today's visits and meeting all other requirements  Future Appointments  No visits were found meeting these conditions  Showing future appointments within next 150 days and meeting all other requirements       The patient was identified by name and date of birth  Enzo Patterson was informed that this is a telemedicine visit and that the [facility] being [facility]  She agrees to proceed     My office door was closed  No one else was in the room  She acknowledged consent and understanding of privacy and security of the video platform  The patient has agreed to participate and understands they can discontinue the visit at any time  Interview was started on [employer], but b/o poor connection part of interview was on the phone  Patient is aware this is a billable service       Subjective  Enzo Patterson is a 50 y o  female with depression and anxiety presents for regular f/u   Off wellbutrin, continued zoloft  SE constipation, vivid dreams improved  Pt was dx with ADHD last year at [facility], but declined stimulants at that time and was tx with wellbutrin  C/o tingling and numbness of her hands - dx "with carpal tunnel (?)  Family/ home stress  HPI   Mood - low motivation, functions close to baseline; reports feeling \" the same\", not worse  Off wellbutrin - more ADHD spx - forgetful, overwhelmed, poor concentration; disorganized  Anxiety - racing thoughts continue  History reviewed  No pertinent past medical history  History reviewed  No pertinent surgical history  Current Outpatient Medications   Medication Sig Dispense Refill   • buPROPion (Wellbutrin SR) 100 mg 12 hr tablet Take 1 tablet (100 mg total) by mouth in the morning 10 tablet 0   • sertraline (Zoloft) 25 mg tablet Take 1 tablet (25 mg total) by mouth daily With 50 mg 30 tablet 1   • sertraline (ZOLOFT) 50 mg tablet Take 1 tablet (50 mg total) by mouth daily 30 tablet 1     No current facility-administered medications for this visit  Not on File    Review of Systems   Constitutional: Negative for activity change and appetite change  Psychiatric/Behavioral: Positive for decreased concentration  Negative for sleep disturbance and suicidal ideas  The patient is nervous/anxious  Video Exam    There were no vitals filed for this visit  Physical Exam  Constitutional:       Appearance: Normal appearance  She is normal weight  Neurological:      Mental Status: She is alert  Psychiatric:         Attention and Perception: Perception normal  She is inattentive  Mood and Affect: Affect normal  Mood is anxious  Speech: Speech normal          Behavior: Behavior normal  Behavior is cooperative  Thought Content:  Thought content normal          Judgment: Judgment normal           Visit Time    Visit Start Time: 2 20 pm   Visit Stop Time: 2 30 pm   Total Visit Duration: 20 minutes      "

## 2023-03-30 ENCOUNTER — TELEMEDICINE (OUTPATIENT)
Dept: BEHAVIORAL/MENTAL HEALTH CLINIC | Facility: CLINIC | Age: 53
End: 2023-03-30

## 2023-03-30 DIAGNOSIS — F41.1 GENERALIZED ANXIETY DISORDER: Primary | ICD-10-CM

## 2023-03-30 NOTE — PSYCH
Virtual Regular Visit    Verification of patient location:    Patient is located in the following state in which I hold an active license Other; [place]       Assessment/Plan:    Problem List Items Addressed This Visit    None  Visit Diagnoses     Generalized anxiety disorder    -  Primary          Goals addressed in session: Goal 1          Reason for visit is   Chief Complaint   Patient presents with   • Virtual Regular Visit        Encounter provider Augustin Rao    Provider located at [address] [place] [address] [address]  [address] [place] [address]  [phone]      Recent Visits  No visits were found meeting these conditions  Showing recent visits within past 7 days and meeting all other requirements  Today's Visits  Date Type Provider Dept   03/30/23 Telemedicine [facility] Therapist [facility]   Showing today's visits and meeting all other requirements  Future Appointments  No visits were found meeting these conditions  Showing future appointments within next 150 days and meeting all other requirements       The patient was identified by name and date of birth  Zaria Dawn was informed that this is a telemedicine visit and that the [facility]  She agrees to proceed     My office door was closed  No one else was in the room  She acknowledged consent and understanding of privacy and security of the video platform  The patient has agreed to participate and understands they can discontinue the visit at any time  Patient is aware this is a billable service  Subjective  Zaria Dawn is a 50 y o  female   HPI     No past medical history on file  No past surgical history on file      Current Outpatient Medications   Medication Sig Dispense Refill   • sertraline (Zoloft) 25 mg tablet Take 1 tablet (25 mg total) by mouth daily With 50 mg 30 tablet 1   • sertraline (ZOLOFT) 50 mg tablet Take 1 tablet (50 mg total) by mouth daily 30 tablet 1     No current facility-administered medications for this visit  Not on File    Review of Systems    Video Exam    There were no vitals filed for this visit  Physical Exam     Behavioral Health Psychotherapy Progress Note    Psychotherapy Provided: Individual Psychotherapy     1  Generalized anxiety disorder            Goals addressed in session: Goal 1     DATA: Client presented for a telehealth session via [address]  Client reported that a couple nights ago, she was experiencing a bad tooth ache  She shared that she had tried to manage it on her own, stating that she does not like taking medication if she does not have to  Client noted that the pain was getting worse and decided to be proactive  Client expressed that she and her  were watching a show together and how she started to feel uncomfortable  She mentioned this to her , who was unable to fully understand where she was coming from  She voiced that she ended up walking away  However, he had taken this personally and started to blame himself  Client talked in-length about this and how the show addressed areas that she struggles with (I e self-image, low self-esteem etc )  She tried to relay this to him, but he took it personally  Client disclosed that he was in an unhealthy relationship prior to her, and this is why he struggles with his own self-image  Client talked about wanting to better communicate with one another and how she is unsure how she can address her concerns more clearly  Client expressed that she had talked with her psychiatrist who would like to revisit Wellbutrin  Client highlighted that she is planning to convert her cargo space within her car to make a bed, where she can travel  Client is hoping to finish the project this weekend    During this session, this clinician used the following therapeutic "modalities: Client-centered Therapy and Cognitive Behavioral Therapy    Substance Abuse was not addressed during this session  If the client is diagnosed with a co-occurring substance use disorder, please indicate any changes in the frequency or amount of use: N/A  Stage of change for addressing substance use diagnoses: No substance use/Not applicable    ASSESSMENT:  Fantasma Hill presents with a Euthymic/ normal mood  her affect is Normal range and intensity, which is congruent, with her mood and the content of the session  The client has made progress on their goals  Fantasma Hill presents with a low risk of suicide, low risk of self-harm, and low risk of harm to others  For any risk assessment that surpasses a \"low\" rating, a safety plan must be developed  A safety plan was indicated: no  If yes, describe in detail N/A    PLAN: Between sessions, Fantasma Hill will review and complete strengths/qualities handout  Both her and her  will complete individually and then have them fill it out about each other  Will discuss afterwards  At the next session, the therapist will use Client-centered Therapy and Cognitive Behavioral Therapy to address anxiety  Behavioral Health Treatment Plan and Discharge Planning: Fantasma Hill is aware of and agrees to continue to work on their treatment plan  They have identified and are working toward their discharge goals   yes    Visit start and stop times:    03/30/23  Start Time: 0903  Stop Time: 1000  Total Visit Time: 57 minutes      "

## 2023-04-25 ENCOUNTER — TELEMEDICINE (OUTPATIENT)
Dept: BEHAVIORAL/MENTAL HEALTH CLINIC | Facility: CLINIC | Age: 53
End: 2023-04-25

## 2023-04-25 DIAGNOSIS — F41.1 GENERALIZED ANXIETY DISORDER: Primary | ICD-10-CM

## 2023-04-25 NOTE — PSYCH
Virtual Regular Visit    Verification of patient location:    Patient is located at Home in the following state in which I hold an active license Other; [place]       Assessment/Plan:    Problem List Items Addressed This Visit    None  Visit Diagnoses     Generalized anxiety disorder    -  Primary          Goals addressed in session: Goal 1          Reason for visit is   Chief Complaint   Patient presents with   • Virtual Regular Visit        Encounter provider Mirella Sheldon    Provider located at [address] [place] [address]  [phone]      Recent Visits  Date Type Provider Dept   04/18/23 Telemedicine [facility]   Showing recent visits within past 7 days and meeting all other requirements  Today's Visits  Date Type Provider Dept   04/25/23 Telemedicine [facility]   Showing today's visits and meeting all other requirements  Future Appointments  No visits were found meeting these conditions  Showing future appointments within next 150 days and meeting all other requirements       The patient was identified by name and date of birth  Vinod Vincent was informed that this is a telemedicine visit and that the [facility]  She agrees to proceed     My office door was closed  No one else was in the room  She acknowledged consent and understanding of privacy and security of the video platform  The patient has agreed to participate and understands they can discontinue the visit at any time  Patient is aware this is a billable service  Subjective  Vinod Vincent is a 50 y o  female   HPI     No past medical history on file  No past surgical history on file      Current Outpatient Medications   Medication Sig Dispense Refill   • sertraline (Zoloft) 25 mg tablet Take 1 tablet (25 mg total) by mouth daily With 50 mg 30 tablet 0   • sertraline (ZOLOFT) 50 mg tablet Take 1 tablet (50 mg total) by mouth daily 30 tablet 0     No current facility-administered medications for this visit  Not on File    Review of Systems    Video Exam    There were no vitals filed for this visit  Physical Exam     Behavioral Health Psychotherapy Progress Note    Psychotherapy Provided: Individual Psychotherapy     1  Generalized anxiety disorder            Goals addressed in session: Goal 1     DATA: Client presented for a telehealth session via [address]  Client reported that she has been working on her 's youngest daughter's room, stating that she was able to paint and get most of the room done  She expressed feeling accomplished, stating that this was something she has been wanting to do  Client talked in-length about feeling as though she is not contributing enough towards the household  She noted that she does not make a lot of money and feels bad that her  is the primary support when it comes to finances  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  She emphasized that it pays more than what she is doing now, but requires most if not all Saturdays  Client disclosed how this will be challenging, addressing how she does a lot of things with the kids over the weekend, and is unsure if she can dedicate that time  Client voiced that her  wants her to take this job, but also understands her concerns  Client reported that she currently works at a [address] [place] [address] and wants to make sure this painting job does not overlap  Client and clinician spoke in-length about her work and how she is contributing in other ways  Discussed how being a mom is a full-time job/responsibility and that she is contributing in a big way  Client and clinician completed crisis plan    During this session, this clinician used the following therapeutic modalities: "Client-centered Therapy and Cognitive Behavioral Therapy    Substance Abuse was not addressed during this session  If the client is diagnosed with a co-occurring substance use disorder, please indicate any changes in the frequency or amount of use: N/A  Stage of change for addressing substance use diagnoses: No substance use/Not applicable    ASSESSMENT:  Yomaira Hodgson presents with a Euthymic/ normal mood  her affect is Normal range and intensity, which is congruent, with her mood and the content of the session  The client has made progress on their goals  Yomaira Hodgson presents with a low risk of suicide, low risk of self-harm, and low risk of harm to others  For any risk assessment that surpasses a \"low\" rating, a safety plan must be developed  A safety plan was indicated: no  If yes, describe in detail N/A    PLAN: Between sessions, Yomaira Hodgson will continue to review handouts distributed  Will inquire more about the painting job and decide what will work for her  Continue to utilize coping skills to manage stress/anxiety  At the next session, the therapist will use Client-centered Therapy and Cognitive Behavioral Therapy to address anxiety  Behavioral Health Treatment Plan and Discharge Planning: Yomaira Hodgson is aware of and agrees to continue to work on their treatment plan  They have identified and are working toward their discharge goals   yes    Visit start and stop times:    04/25/23  Start Time: 0900  Stop Time: 1000  Total Visit Time: 60 minutes      "

## 2023-04-25 NOTE — BH CRISIS PLAN
"Client Name: Cynthia Pearson       Client YOB: 1970  : 1970    Treatment Team (include name and contact information):     Psychotherapist: Kathleen Viera    Psychiatrist: N/A   Release of information completed: no    \" N/A   Release of information completed: no    Other (Specify Role): N/A    Release of information completed: no    Other (Specify Role): N/A   Release of information completed: no    Healthcare Provider  [facility] [id number]    Type of Plan   * Child plans (children 15 yo and younger) must be completed and signed by the child's legal guardian   * Plans for all individuals 15 yo and above must be signed by the client  Plan Type: adolescent/adult (14 and over) Initial      My Personal Strengths are (in the client's own words):  [address]  Good listener  [place]  Empathetic  Look for the good in people  Good at doing hair  Know her beers  Pretty adventurous, loves nature     The stressors and triggers that may put me at risk are:  other (describe) Feeling not good enough, not being able to do what I want/should be able to do, if someone who matters to me talks down on me, looking in the mirror    Coping skills I can use to keep myself calm and safe: Other (describe) go to bed, [place] with kids, go out in nature, listen to music, sing, play the ukulele, drink sometimes to relax, pray, talk with sister, hangout with pets    Coping skills/supports I can use to maintain abstinence from substance use:   N/A    The people that provide me with help and support: (Include name, contact, and how they can help)   Support person #1:     * Phone number: Contact in phone    * How can they help me? Provided emotional support, encouragement    Support person #2:Sister    * Phone number: Contact in phone    * How can they help me?  Emotional support encouragement     Support " person #3: Friends, family and kids    * Phone number: Contacts are in phone    * How can they help me? Supportive, encouraging, emotional support    In the past, the following has helped me in times of crisis:    Being with other people, Calling a friend, Calling a family member, Taking a walk or exercising, Praying or meditating and Listening to music      If it is an emergency and you need immediate help, call 9-1-1    If there is a possibility of danger to yourself or others, call the following crisis hotline resources:     Adult Crisis Numbers  Suicide Prevention Hotline - Dial 9-8-8  [facility]: [address]: [address]: [address]: [phone]  [address] [facility] ([facility]): [address] [place] [address] Street: [address] [place] [address] Avenue: [address] [place] [address] St: [phone] (daytime)  [phone] (after hours, weekends, holidays)     Child/Adolescent Crisis Numbers   [facility]: [address]: [phone]   [facility]: [phone]   [address] ([facility]): [phone]    Please note: Some [facility] do not have a separate number for Child/Adolescent specific crisis  If your county is not listed under Child/Adolescent, please call the adult number for your county     National Talk to Text Line   All Ages - [phone]    In the event your feelings become unmanageable, and you cannot reach your support system, you will call 911 immediately or go to the nearest hospital emergency room

## 2023-05-02 ENCOUNTER — TELEMEDICINE (OUTPATIENT)
Dept: BEHAVIORAL/MENTAL HEALTH CLINIC | Facility: CLINIC | Age: 53
End: 2023-05-02

## 2023-05-02 DIAGNOSIS — F41.1 GENERALIZED ANXIETY DISORDER: Primary | ICD-10-CM

## 2023-05-02 NOTE — PSYCH
Virtual Regular Visit    Verification of patient location:    Patient is located at Home in the following state in which I hold an active license Other; [place]       Assessment/Plan:    Problem List Items Addressed This Visit    None  Visit Diagnoses     Generalized anxiety disorder    -  Primary          Goals addressed in session: Goal 1          Reason for visit is   Chief Complaint   Patient presents with    Virtual Regular Visit        Encounter provider Manuel Zuleta    Provider located at [address] [place] [address] [address]  [address] [place] [address]  [phone]      Recent Visits  Date Type Provider Dept   04/25/23 Telemedicine [facility]   Showing recent visits within past 7 days and meeting all other requirements  Today's Visits  Date Type Provider Dept   05/02/23 Telemedicine [facility]   Showing today's visits and meeting all other requirements  Future Appointments  No visits were found meeting these conditions  Showing future appointments within next 150 days and meeting all other requirements       The patient was identified by name and date of birth  Ly Grider was informed that this is a telemedicine visit and that the [facility]  She agrees to proceed     My office door was closed  No one else was in the room  She acknowledged consent and understanding of privacy and security of the video platform  The patient has agreed to participate and understands they can discontinue the visit at any time  Patient is aware this is a billable service  Subjective  Ly Grider is a 50 y o  female   HPI     No past medical history on file  No past surgical history on file      Current Outpatient Medications   Medication Sig Dispense Refill    sertraline (Zoloft) 25 mg tablet Take 1 tablet (25 mg total) by mouth daily With 50 mg 30 tablet 0    sertraline (ZOLOFT) 50 mg tablet Take 1 tablet (50 mg total) by mouth daily 30 tablet 0     No current facility-administered medications for this visit  Not on File    Review of Systems    Video Exam    There were no vitals filed for this visit  Physical Exam     Behavioral Health Psychotherapy Progress Note    Psychotherapy Provided: Individual Psychotherapy     1  Generalized anxiety disorder            Goals addressed in session: Goal 1     DATA: Client presented for a telehealth session via [address]  Client reported that she had a busy day yesterday at work  She mentioned that work hosted an event and how she was out majority of the day  She shared that her daughter was home sick and how if she does not feel better later this week; she plans to take her to the doctors  Client talked about wanting to reorganize her bedroom and how she has been putting this off for sometime  She addressed wanting to get other things done around the house, but have not had the time  Client shared that her  helps around the house as much as he can, but he does not feel he is doing enough  Client feels similarly and how they have had conversations around this  Client was given a strengths and qualities handout several weeks ago, that she and her  were encouraged to work on separately and together  Client wants to revisit this handout and find time to complete it  Client would like to look into couples counseling, sharing that she believes this will help strengthen their relationship  Client spoke briefly about his past relationship and how it effects him now  Client highlighted that her son may be able to graduate with his class, as long as he is able to complete and pass his modern government course  Client is staying hopeful     During this session, this clinician used the following therapeutic modalities: Client-centered "Therapy and Cognitive Behavioral Therapy    Substance Abuse was not addressed during this session  If the client is diagnosed with a co-occurring substance use disorder, please indicate any changes in the frequency or amount of use: N/A  Stage of change for addressing substance use diagnoses: No substance use/Not applicable    ASSESSMENT:  Shanta Chapman presents with a Euthymic/ normal mood  her affect is Normal range and intensity, which is congruent, with her mood and the content of the session  The client has made progress on their goals  Shanta Chapman presents with a low risk of suicide, low risk of self-harm, and low risk of harm to others  For any risk assessment that surpasses a \"low\" rating, a safety plan must be developed  A safety plan was indicated: no  If yes, describe in detail N/A    PLAN: Between sessions, Shanta Chapman will review and complete strengths and qualities exercise  Will discuss with   Client will continue to utilize coping skills to manage stress/anxiety   At the next session, the therapist will use Client-centered Therapy and Cognitive Behavioral Therapy to address anxiety  Behavioral Health Treatment Plan and Discharge Planning: Shanta Chapman is aware of and agrees to continue to work on their treatment plan  They have identified and are working toward their discharge goals   yes    Visit start and stop times:    05/02/23  Start Time: 0900  Stop Time: 1000  Total Visit Time: 60 minutes      "

## 2023-05-09 ENCOUNTER — TELEMEDICINE (OUTPATIENT)
Dept: BEHAVIORAL/MENTAL HEALTH CLINIC | Facility: CLINIC | Age: 53
End: 2023-05-09

## 2023-05-09 DIAGNOSIS — F41.1 GENERALIZED ANXIETY DISORDER: Primary | ICD-10-CM

## 2023-05-09 DIAGNOSIS — F33.0 MAJOR DEPRESSIVE DISORDER, RECURRENT, MILD (HCC): ICD-10-CM

## 2023-05-09 NOTE — PSYCH
Virtual Regular Visit    Verification of patient location:    Patient is located at Home in the following state in which I hold an active license Other; [place]       Assessment/Plan:    Problem List Items Addressed This Visit    None  Visit Diagnoses     Generalized anxiety disorder    -  Primary    Major depressive disorder, recurrent, mild ([facility] )              Goals addressed in session: Goal 1          Reason for visit is   Chief Complaint   Patient presents with   • Virtual Regular Visit        Encounter provider Austin Solomon    Provider located at [address] [place] [address]  [phone]      Recent Visits  Date Type Provider Dept   05/02/23 Telemedicine [facility]   Showing recent visits within past 7 days and meeting all other requirements  Today's Visits  Date Type Provider Dept   05/09/23 Telemedicine [facility]   Showing today's visits and meeting all other requirements  Future Appointments  No visits were found meeting these conditions  Showing future appointments within next 150 days and meeting all other requirements       The patient was identified by name and date of birth  Alex Ladn was informed that this is a telemedicine visit and that [facility]  She agrees to proceed     My office door was closed  No one else was in the room  She acknowledged consent and understanding of privacy and security of the video platform  The patient has agreed to participate and understands they can discontinue the visit at any time  Patient is aware this is a billable service  Subjective  Alex Land is a 50 y o  female   HPI     No past medical history on file  No past surgical history on file      Current Outpatient Medications Medication Sig Dispense Refill   • sertraline (Zoloft) 25 mg tablet Take 1 tablet (25 mg total) by mouth daily With 50 mg 30 tablet 0   • sertraline (ZOLOFT) 50 mg tablet Take 1 tablet (50 mg total) by mouth daily 30 tablet 0     No current facility-administered medications for this visit  Not on File    Review of Systems    Video Exam    There were no vitals filed for this visit  Physical Exam     Behavioral Health Psychotherapy Progress Note    Psychotherapy Provided: Individual Psychotherapy     1  Generalized anxiety disorder        2  Major depressive disorder, recurrent, mild ([facility] )            Goals addressed in session: Goal 1     DATA: Client presented for a telehealth session via [employer]  Client reported that work has been going fine and the kids are doing well  She mentioned that there is still a chance for her son to graduate with his class; as long as he can pass modern government  Client shared that she worked [employer] in [place], which she said the [employer] was busy  Client shared that she will be going to [place] for a [employer] job from May 19th to the 24th  Client talked in-length about relationship conflict between her and her  and challenges with parenting when involved in a blended family  Client addressed how she and her  rarely have time for each other and how this has taken a toll on them  Clinician actively listened, provided emotional support and reflected on client's family situation as a whole  Clinician encouraged client to explore the possibility of implementing family dinner (client stated that they do not eat dinner together as a family)  Clinician emphasized the importance of having conversations with one another and how this may help their relationship as a whole  Talked about boundary setting with one another when it comes to parenting each other's children    During this session, this clinician used the following therapeutic modalities: Engagement "Strategies, Client-centered Therapy and Cognitive Behavioral Therapy    Substance Abuse was not addressed during this session  If the client is diagnosed with a co-occurring substance use disorder, please indicate any changes in the frequency or amount of use: N/A  Stage of change for addressing substance use diagnoses: No substance use/Not applicable    ASSESSMENT:  Cintia Keyes presents with a Euthymic/ normal mood  her affect is Normal range and intensity, which is congruent, with her mood and the content of the session  The client has made progress on their goals  Cintia Keyes presents with a low risk of suicide, low risk of self-harm, and low risk of harm to others  For any risk assessment that surpasses a \"low\" rating, a safety plan must be developed  A safety plan was indicated: no  If yes, describe in detail N/A    PLAN: Between sessions, Cintia Keyes will talk with  about implementing one day to family dinner  Will reflect on strengths and qualities handout and begin having conversation(s) around what they placed for each section  At the next session, the therapist will use Engagement Strategies, Client-centered Therapy and Cognitive Behavioral Therapy to address anxiety and relationship conflicts  Behavioral Health Treatment Plan and Discharge Planning: Cintia Keyes is aware of and agrees to continue to work on their treatment plan  They have identified and are working toward their discharge goals   yes    Visit start and stop times:    05/09/23  Start Time: 0907  Stop Time: 1000  Total Visit Time: 53 minutes      "

## 2023-05-16 ENCOUNTER — TELEMEDICINE (OUTPATIENT)
Dept: BEHAVIORAL/MENTAL HEALTH CLINIC | Facility: CLINIC | Age: 53
End: 2023-05-16

## 2023-05-16 ENCOUNTER — TELEMEDICINE (OUTPATIENT)
Dept: PSYCHIATRY | Facility: CLINIC | Age: 53
End: 2023-05-16

## 2023-05-16 DIAGNOSIS — F41.1 GENERALIZED ANXIETY DISORDER: ICD-10-CM

## 2023-05-16 DIAGNOSIS — F32.A DEPRESSION, UNSPECIFIED DEPRESSION TYPE: Primary | ICD-10-CM

## 2023-05-16 DIAGNOSIS — F41.1 GENERALIZED ANXIETY DISORDER: Primary | ICD-10-CM

## 2023-05-16 RX ORDER — SERTRALINE HYDROCHLORIDE 100 MG/1
100 TABLET, FILM COATED ORAL DAILY
Qty: 30 TABLET | Refills: 1 | Status: SHIPPED | OUTPATIENT
Start: 2023-05-16

## 2023-05-16 NOTE — PSYCH
Virtual Regular Visit    Verification of patient location:    Patient is located at Home in the following state in which I hold an active license Other; [place]       Assessment/Plan:    Problem List Items Addressed This Visit    None  Visit Diagnoses     Generalized anxiety disorder    -  Primary          Goals addressed in session: Goal 1          Reason for visit is   Chief Complaint   Patient presents with   • Virtual Regular Visit        Encounter provider Olinda La    Provider located at [address] [place], [place] [id number]  [address] [place] [address]  [phone]      Recent Visits  Date Type Provider Dept   05/09/23 Telemedicine [place]   Showing recent visits within past 7 days and meeting all other requirements  Today's Visits  Date Type Provider Dept   05/16/23 Telemedicine [place] [facility]   Showing today's visits and meeting all other requirements  Future Appointments  No visits were found meeting these conditions  Showing future appointments within next 150 days and meeting all other requirements       The patient was identified by name and date of birth  Zeyad Leon was informed that this is a telemedicine visit and [facility]  She agrees to proceed     My office door was closed  No one else was in the room  She acknowledged consent and understanding of privacy and security of the video platform  The patient has agreed to participate and understands they can discontinue the visit at any time  Patient is aware this is a billable service  Subjective  Zeyad Leon is a 50 y o  female   HPI     No past medical history on file  No past surgical history on file      Current Outpatient Medications   Medication Sig Dispense Refill   • sertraline (ZOLOFT) 100 mg tablet Take 1 tablet (100 mg total) by mouth daily 30 tablet 1     No current facility-administered medications for this visit  Not on File    Review of Systems    Video Exam    There were no vitals filed for this visit  Physical Exam     Behavioral Health Psychotherapy Progress Note    Psychotherapy Provided: Individual Psychotherapy     1  Generalized anxiety disorder            Goals addressed in session: Goal 1     DATA: Client presented for a telehealth session via [address]  Client reported that her youngest son has been sick with strep throat  She shared that he is on medication, but he has not gotten better  She had called the doctors, since he still has a fever and showing no signs of recovering  Client has an appointment to take him later this afternoon  In the meantime, client highlighted that her oldest son officially completed his online course (modern government) and was eager to share that he will be graduating with his class  She mentioned that he does not want to walk at his high school graduation, but rather the community college one  Client has graduation on May 24th  Client expressed that she is looking forward to her trip to [place]  She is not sure if she will get paid for the work, stating that she gets to stay at the home for free  Client noted that she has her driving test on Thursday and how she has been doing well practicing  Client emphasized having a good Mother's Day, which was spent with her family  Client talked about having her psychiatry appointment later this morning and setting boundaries at work  During this session, this clinician used the following therapeutic modalities: Engagement Strategies, Client-centered Therapy and Cognitive Behavioral Therapy    Substance Abuse was not addressed during this session  If the client is diagnosed with a co-occurring substance use disorder, please indicate any changes in the frequency or amount of use: N/A   Stage of "change for addressing substance use diagnoses: No substance use/Not applicable    ASSESSMENT:  Madhavi Miller presents with a Euthymic/ normal mood  her affect is Normal range and intensity, which is congruent, with her mood and the content of the session  The client has made progress on their goals  Madhavi Miller presents with a low risk of suicide, low risk of self-harm, and low risk of harm to others  For any risk assessment that surpasses a \"low\" rating, a safety plan must be developed  A safety plan was indicated: no  If yes, describe in detail N/A    PLAN: Between sessions, Madhavi Miller will continue to utilize coping skills to manage stress/anxiety  At the next session, the therapist will use Client-centered Therapy and Cognitive Behavioral Therapy to address anxiety  Behavioral Health Treatment Plan and Discharge Planning: Madhavi Miller is aware of and agrees to continue to work on their treatment plan  They have identified and are working toward their discharge goals   yes    Visit start and stop times:    05/16/23  Start Time: 0815  Stop Time: 0900  Total Visit Time: 45 minutes      "

## 2023-05-16 NOTE — PSYCH
"  Virtual Regular Visit    Verification of patient location:    Patient is located at Home in the following state in which I hold an active license [place]      Assessment/Plan:    Problem List Items Addressed This Visit    None      Goals addressed in session: Goal 1          Reason for visit is   Chief Complaint   Patient presents with   • Medication Management        Encounter provider Veronique Young MD    Provider located at [address] [facility]  [address]  [phone]      Recent Visits  No visits were found meeting these conditions  Showing recent visits within past 7 days and meeting all other requirements  Today's Visits  Date Type Provider Dept   05/16/23 Telemedicine Veronique Young, [facility] today's visits and meeting all other requirements  Future Appointments  No visits were found meeting these conditions  Showing future appointments within next 150 days and meeting all other requirements       The patient was identified by name and date of birth  Abbey Gastelum was informed that this is a telemedicine visit and that the visit [facility] being [facility]  She agrees to proceed     My office door was closed  No one else was in the room  She acknowledged consent and understanding of privacy and security of the video platform  The patient has agreed to participate and understands they can discontinue the visit at any time  Patient is aware this is a billable service  Subjective  Abbey Gastelum is a 50 y o  female with depression and anxiety presents for regular f/u    Compliant with meds, denies SE  Older son graduated HS; takes a gap year  Denies acute medical problems      HPI   Mood - continues to have mild daily episodes of negative thoughts, low self esteem, \" worthless\" - gets sad  Anxiety - racing thoughts  ADHD - manages with coping skills  History reviewed   " No pertinent past medical history  History reviewed  No pertinent surgical history  Current Outpatient Medications   Medication Sig Dispense Refill   • sertraline (Zoloft) 25 mg tablet Take 1 tablet (25 mg total) by mouth daily With 50 mg 30 tablet 0   • sertraline (ZOLOFT) 50 mg tablet Take 1 tablet (50 mg total) by mouth daily 30 tablet 0     No current facility-administered medications for this visit  Not on File    Review of Systems   Constitutional: Negative for activity change and appetite change  Psychiatric/Behavioral: Positive for dysphoric mood  Negative for sleep disturbance and suicidal ideas  The patient is not nervous/anxious  Video Exam    There were no vitals filed for this visit  Physical Exam  Constitutional:       Appearance: Normal appearance  She is normal weight  Neurological:      Mental Status: She is alert  Psychiatric:         Attention and Perception: Perception normal  She is inattentive  Mood and Affect: Affect normal  Mood is anxious and depressed  Speech: Speech normal          Behavior: Behavior normal  Behavior is cooperative  Thought Content:  Thought content normal          Judgment: Judgment normal           Visit Time    Visit Start Time: 9 22 am   Visit Stop Time: 9 29 am   Total Visit Duration: 15 minutes

## 2023-05-23 ENCOUNTER — TELEMEDICINE (OUTPATIENT)
Dept: BEHAVIORAL/MENTAL HEALTH CLINIC | Facility: CLINIC | Age: 53
End: 2023-05-23

## 2023-05-23 DIAGNOSIS — F41.1 GENERALIZED ANXIETY DISORDER: Primary | ICD-10-CM

## 2023-06-20 ENCOUNTER — TELEMEDICINE (OUTPATIENT)
Dept: BEHAVIORAL/MENTAL HEALTH CLINIC | Facility: CLINIC | Age: 53
End: 2023-06-20
Payer: COMMERCIAL

## 2023-06-20 DIAGNOSIS — F41.1 GENERALIZED ANXIETY DISORDER: Primary | ICD-10-CM

## 2023-06-20 PROCEDURE — 90837 PSYTX W PT 60 MINUTES: CPT | Performed by: SOCIAL WORKER

## 2023-06-20 NOTE — PSYCH
Virtual Regular Visit    Verification of patient location:    Patient is located at Home in the following state in which I hold an active license Other; [place]       Assessment/Plan:    Problem List Items Addressed This Visit    None  Visit Diagnoses     Generalized anxiety disorder    -  Primary          Goals addressed in session: Goal 1          Reason for visit is   Chief Complaint   Patient presents with   • Virtual Regular Visit        Encounter provider Star Chandra    Provider located at [address] [place], [place] [id number]  [address] [place] [address]  [phone]      Recent Visits  No visits were found meeting these conditions  Showing recent visits within past 7 days and meeting all other requirements  Today's Visits  Date Type Provider Dept   06/20/23 Telemedicine [facility]   Showing today's visits and meeting all other requirements  Future Appointments  No visits were found meeting these conditions  Showing future appointments within next 150 days and meeting all other requirements       The patient was identified by name and date of birth  Harmony Alberts was informed that this is a telemedicine visit and that the [facility]  She agrees to proceed     My office door was closed  No one else was in the room  She acknowledged consent and understanding of privacy and security of the video platform  The patient has agreed to participate and understands they can discontinue the visit at any time  Patient is aware this is a billable service  Subjective  Harmony Alberts is a 50 y o  female   HPI     No past medical history on file  No past surgical history on file      Current Outpatient Medications   Medication Sig Dispense Refill   • sertraline (ZOLOFT) 100 mg tablet Take 1 tablet (100 mg total) by mouth daily 30 tablet 1     No current facility-administered medications for this visit  Not on File    Review of Systems    Video Exam    There were no vitals filed for this visit  Physical Exam     Behavioral Health Psychotherapy Progress Note    Psychotherapy Provided: Individual Psychotherapy     1  Generalized anxiety disorder            Goals addressed in session: Goal 1     DATA: Client presented for a telehealth session via [address]  Client reported that she has been helping her mom with appointments  Disclosed that her mom is suffering from Alzheimer's and how they are trying to connect her to the right specialists/supports  Client shared that she has been working a significant amount and it showed in her last paycheck  She highlighted that it was the most she has ever made on a paycheck  Client voiced that she has been doing her best to contribute to the household financially  Client expressed that their pet guinea pig had passed, but they had adopted a bunny  Client mentioned feeling more tired and fatigued and feels this may be a side effect from the sertraline  She voiced needing to go to the doctors, but has been holding off  She talked about the possibility of having carpel tunnel and needing to get this checked out  Client shared that Father's Day was tough and how she was unable to afford to do anything for him  Clinician actively listened, provided emotional support, validated her feelings/emotions around Father's day, discussed in-length what she has been able to do to show him how appreciative she is of him  Emphasized the importance of their relationship and how they have each other and reassure each other  During this session, this clinician used the following therapeutic modalities: Client-centered Therapy and Cognitive Behavioral Therapy    Substance Abuse was not addressed during this session   If the client is diagnosed with a co-occurring substance use disorder, please indicate "any changes in the frequency or amount of use: N/A  Stage of change for addressing substance use diagnoses: No substance use/Not applicable    ASSESSMENT:  Mauricio Sutton presents with a Euthymic/ normal mood  her affect is Normal range and intensity, which is congruent, with her mood and the content of the session  The client has made progress on their goals  Mauricio Sutton presents with a low risk of suicide, low risk of self-harm, and low risk of harm to others  For any risk assessment that surpasses a \"low\" rating, a safety plan must be developed  A safety plan was indicated: no  If yes, describe in detail N/A    PLAN: Between sessions, Mauricio Sutton will continue to utilize coping skills to manage stress/anxiety  At the next session, the therapist will use Client-centered Therapy and Cognitive Behavioral Therapy to address anxiety  Behavioral Health Treatment Plan and Discharge Planning: Mauricio Sutton is aware of and agrees to continue to work on their treatment plan  They have identified and are working toward their discharge goals   yes    Visit start and stop times:    06/20/23  Start Time: 0905  Stop Time: 1000  Total Visit Time: 55 minutes      "

## 2023-07-05 ENCOUNTER — TELEMEDICINE (OUTPATIENT)
Dept: BEHAVIORAL/MENTAL HEALTH CLINIC | Facility: CLINIC | Age: 53
End: 2023-07-05
Payer: COMMERCIAL

## 2023-07-05 DIAGNOSIS — F32.A DEPRESSION, UNSPECIFIED DEPRESSION TYPE: ICD-10-CM

## 2023-07-05 DIAGNOSIS — F41.1 GENERALIZED ANXIETY DISORDER: Primary | ICD-10-CM

## 2023-07-05 PROCEDURE — 90837 PSYTX W PT 60 MINUTES: CPT | Performed by: SOCIAL WORKER

## 2023-07-05 NOTE — BH TREATMENT PLAN
Outpatient [address] [place] [address]  1970     Date of Initial Psychotherapy Assessment: September 14, 2022   Date of Current Treatment Plan: 07/05/23  Treatment Plan Target Date: October 2023  Treatment Plan Expiration Date: January 2024    Diagnosis:   1. Generalized anxiety disorder        2. Depression, unspecified depression type            Area(s) of Need: Work on doing things for me and manage ADHD symptoms    Long Term Goal 1 (in the client's own words): Begin working on doing things for myself (I.e. schedule doctors appointments)    Stage of Change: Contemplation    Target Date for completion: October 2023     Anticipated therapeutic modalities: CBT, MI, Mindfulness     People identified to complete this goal: Self      Objective 1: (identify the means of measuring success in meeting the objective): Begin to prioritize client's health and wellbeing      Objective 2: (identify the means of measuring success in meeting the objective): Understand the potential risk(s) if client does not take care of herself      Long Term Goal 2 (in the client's own words): Manage ADHD symptoms    Stage of Change: Contemplation    Target Date for completion: January 2024     Anticipated therapeutic modalities: Medication Management     People identified to complete this goal: Self, Psychiatrist      Objective 1: (identify the means of measuring success in meeting the objective): Meet with psychiatrist and therapist regularly      Objective 2: (identify the means of measuring success in meeting the objective):  Take medications as prescribed     Long Term Goal 3 (in the client's own words): N/A    Stage of Change: N/A    Target Date for completion: N/A     Anticipated therapeutic modalities: N/A     People identified to complete this goal: N/A      Objective 1: (identify the means of measuring success in meeting the objective): N/A      Objective 2: (identify the means of measuring success in meeting the objective): N/A     I am currently under the care of a [facility] psychiatric provider: yes    My [facility] psychiatric provider is: Dr. Candi Schwab    I am currently taking psychiatric medications: Yes, as prescribed    I feel that I will be ready for discharge from mental health care when I reach the following (measurable goal/objective): When I have better control over my mental health    For children and adults who have a legal guardian:   Has there been any change to custody orders and/or guardianship status? No. If yes, attach updated documentation. I have updated my Crisis Plan and have been offered a copy of this plan    [address]: Diagnosis and Treatment Plan explained to [id number] [address] acknowledges an understanding of their diagnosis. Vasquez Gunn agrees to this treatment plan. I have been offered a copy of this Treatment Plan.  no

## 2023-07-05 NOTE — PSYCH
Virtual Regular Visit    Verification of patient location:    Patient is located at Other in the following state in which I hold an active license Other; [place]       Assessment/Plan:    Problem List Items Addressed This Visit        Other    Depression   Other Visit Diagnoses     Generalized anxiety disorder    -  Primary          Goals addressed in session: Goal 1          Reason for visit is   Chief Complaint   Patient presents with   • Virtual Regular Visit        Encounter provider Simon Gonzalez    Provider located at [address] [place] [address]  [phone]      Recent Visits  No visits were found meeting these conditions. Showing recent visits within past 7 days and meeting all other requirements  Today's Visits  Date Type Provider Dept   07/05/23 [facility]   Showing today's visits and meeting all other requirements  Future Appointments  No visits were found meeting these conditions. Showing future appointments within next 150 days and meeting all other requirements       The patient was identified by name and date of birth. Alycia Hammer was informed that this is a telemedicine visit and [facility]. She agrees to proceed. .  My office door was closed. No one else was in the room. She acknowledged consent and understanding of privacy and security of the video platform. The patient has agreed to participate and understands they can discontinue the visit at any time. Patient is aware this is a billable service. Subjective  Alycia Hammer is a 50 y.o. female . HPI     No past medical history on file. No past surgical history on file.     Current Outpatient Medications   Medication Sig Dispense Refill   • sertraline (ZOLOFT) 100 mg tablet Take 1 tablet (100 mg total) by mouth daily 30 tablet 1     No current facility-administered medications for this visit. Not on File    Review of Systems    Video Exam    There were no vitals filed for this visit. Physical Exam     Behavioral Health Psychotherapy Progress Note    Psychotherapy Provided: Individual Psychotherapy     1. Generalized anxiety disorder        2. Depression, unspecified depression type            Goals addressed in session: Goal 1     DATA: Client presented for a telehealth session via [facility]. Client reported that she would like looking into weaning off Zoloft. She mentioned that she has been feeling more tired, fatigued and feeling worse than before. She mentioned that she has an appointment with her psychiatrist next week to address her concerns. Client talked about wanting to feel like herself again and at the moment; she is feeling the opposite. Client talked about her mom and how she would like support in finding programs that can assist. Client noted that her mom was recently diagnosed with early onset of alzheimer's and how she will qualify for supports. Client expressed concerns for her mom and wanting to support her the best she can. She voiced needing to talk to her sister and discuss what they think is best for their mom. In the meantime, client and clinician spent the remainder of session discussing, updating and completing both treatment plan and crisis plan.  Clinician actively listened, provided emotional support, validated client's concerns involving her mom and encouraged client to write down questions, side effects, what she is hoping to get out of her next appointment for her psychiatrist. Discussed possible outcomes for her medication (I.e. being weaned off (decreasing dose), explore a PRN, stop medication all together etc.)  During this session, this clinician used the following therapeutic modalities: Client-centered Therapy and Cognitive Behavioral Therapy    Substance Abuse was not addressed during this session. If the client is diagnosed with a co-occurring substance use disorder, please indicate any changes in the frequency or amount of use: N/A. Stage of change for addressing substance use diagnoses: No substance use/Not applicable    ASSESSMENT:  Luis Ojeda presents with a Euthymic/ normal mood. her affect is Normal range and intensity, which is congruent, with her mood and the content of the session. The client has made progress on their goals. Luis Ojeda presents with a low risk of suicide, low risk of self-harm, and low risk of harm to others. For any risk assessment that surpasses a "low" rating, a safety plan must be developed. A safety plan was indicated: no  If yes, describe in detail N/A    PLAN: Between sessions, Luis Ojeda will continue to utilize coping skills to manage stress/anxiety. Will write down key points she would like to address at her psychiatrist appointment. At the next session, the therapist will use Client-centered Therapy and Cognitive Behavioral Therapy to address anxiety. Behavioral Health Treatment Plan and Discharge Planning: Luis Ojeda is aware of and agrees to continue to work on their treatment plan. They have identified and are working toward their discharge goals.  yes    Visit start and stop times:    07/05/23  Start Time: 0804  Stop Time: 0900  Total Visit Time: 56 minutes

## 2023-07-05 NOTE — BH CRISIS PLAN
Client Name: Kelsie Aburto       Client YOB: 1970  : 1970    Treatment Team (include name and contact information):     Psychotherapist: Justina Dennis    Psychiatrist: Dr. Jackie Valenzuela of information completed: yes    " N/A   Release of information completed: no    Other (Specify Role): N/A    Release of information completed: no    Other (Specify Role): N/A   Release of information completed: no    Healthcare Provider  Bronwyn Mcintyre MD  [address] [place] [address]    Type of Plan   * Child plans (children 15 yo and younger) must be completed and signed by the child's legal guardian   * Plans for all individuals 15 yo and above must be signed by the client. Plan Type: adolescent/adult (15 and over) Update/Review      My Personal Strengths are (in the client's own words):  "Being there for others, thinking of others, good at my job, managing money, caring, like to make people laugh, giving and be there to help my kids"    The stressors and triggers that may put me at risk are:  Seeing myself in the mirror, watching a show with an "attractive" woman, feeling overwhelmed, when people are demeaning or say things that I struggle with    Coping skills I can use to keep myself calm and safe:  Listen to music, Call a friend or family member, [place]/meditate and Other (describe) walk away, occupy self, watch camping videos    Coping skills/supports I can use to maintain abstinence from substance use:   Not Applicable    The people that provide me with help and support: (Include name, contact, and how they can help)   Support person #1: , Ayaan Gill    * Phone number: in cell phone    * How can they help me? Supportive   Support person #2: SisterJose Miguel Farmer    * Phone number: in cell phone    * How can they help me? Supportive     Support person #3: My kids    * Phone number: in cell phone    * How can they help me?  Supportive    In the past, the following has helped me in times of crisis:    Being with other people, Taking medications, Calling a friend, Calling a family member, Breathing exercises (or other mindfulness-based activities), Praying or meditating, Listening to music and Watching television or a movie      If it is an emergency and you need immediate help, call     If there is a possibility of danger to yourself or others, call the following crisis hotline resources:     Adult Crisis Numbers  Suicide Prevention Hotline - Dial   [facility]: [address]: [address]: [address]: [address]: [address]: [address]: [address]: [phone] (daytime). [phone] (after hours, weekends, holidays)     Child/Adolescent Crisis Numbers   [facility]: [address]: [phone]   Ian Harm: [phone]   [facility]: [phone]    Please note: Some [facility] do not have a separate number for Child/Adolescent specific crisis. If your county is not listed under Child/Adolescent, please call the adult number for your county     National Talk to Text Line   All Ages - [phone]    In the event your feelings become unmanageable, and you cannot reach your support system, you will call 911 immediately or go to the nearest hospital emergency room.

## 2023-07-11 ENCOUNTER — TELEMEDICINE (OUTPATIENT)
Dept: PSYCHIATRY | Facility: CLINIC | Age: 53
End: 2023-07-11
Payer: COMMERCIAL

## 2023-07-11 DIAGNOSIS — F41.1 GENERALIZED ANXIETY DISORDER: ICD-10-CM

## 2023-07-11 DIAGNOSIS — F32.A DEPRESSION, UNSPECIFIED DEPRESSION TYPE: Primary | ICD-10-CM

## 2023-07-11 PROCEDURE — 99214 OFFICE O/P EST MOD 30 MIN: CPT | Performed by: PSYCHIATRY & NEUROLOGY

## 2023-07-11 RX ORDER — SERTRALINE HYDROCHLORIDE 100 MG/1
100 TABLET, FILM COATED ORAL DAILY
Qty: 30 TABLET | Refills: 1 | Status: SHIPPED | OUTPATIENT
Start: 2023-07-11

## 2023-07-11 NOTE — PSYCH
Virtual Regular Visit    Verification of patient location:    Patient is located at Other in the following state in which I hold an active license [place]      Assessment/Plan:    Problem List Items Addressed This Visit    None      Goals addressed in session: Goal 1          Reason for visit is   Chief Complaint   Patient presents with   • Medication Management        Encounter provider Nicolás Clark MD    Provider located at [address] [place] [address],[address]  [address]  [phone]      Recent Visits  No visits were found meeting these conditions. Showing recent visits within past 7 days and meeting all other requirements  Today's Visits  Date Type Provider Dept   07/11/23 Telemedicine Nicolás Clark, [address] today's visits and meeting all other requirements  Future Appointments  No visits were found meeting these conditions. Showing future appointments within next 150 days and meeting all other requirements       The patient was identified by name and date of birth. Julia Powers was informed that this is a telemedicine visit and that [facility] [employer]. She agrees to proceed. .  My office door was closed. No one else was in the room. She acknowledged consent and understanding of privacy and security of the video platform. The patient has agreed to participate and understands they can discontinue the visit at any time. Patient is aware this is a billable service. Subjective  Julia Powers is a 50 y.o. female with depression and anxiety presents for regular f/u   On zoloft 100 mg ; SE - sedation? - unclear  Failed wellbutrin .   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 No pertinent past medical history. History reviewed. No pertinent surgical history. Current Outpatient Medications   Medication Sig Dispense Refill   • sertraline (ZOLOFT) 100 mg tablet Take 1 tablet (100 mg total) by mouth daily 30 tablet 1     No current facility-administered medications for this visit. Not on File    Review of Systems   Constitutional: Positive for activity change (less active). Negative for appetite change. Psychiatric/Behavioral: Positive for dysphoric mood. Negative for sleep disturbance and suicidal ideas. The patient is not nervous/anxious. Video Exam    There were no vitals filed for this visit. Physical Exam  Constitutional:       Appearance: Normal appearance. She is normal weight. Neurological:      Mental Status: She is alert. Psychiatric:         Attention and Perception: Attention and perception normal.         Mood and Affect: Mood is depressed. Affect is blunt. Speech: Speech normal.         Behavior: Behavior normal. Behavior is cooperative. Thought Content:  Thought content normal.         Judgment: Judgment normal.          Visit Time    Visit Start Time: 8.57 am   Visit Stop Time: 9.07 am   Total Visit Duration: 18 minutes

## 2023-07-11 NOTE — PATIENT INSTRUCTIONS
Continue zoloft 100 mg   Pt will f/u with PCP for check up and blood work to r/o medical problems  Might consider meds change if necessary

## 2023-07-19 ENCOUNTER — TELEMEDICINE (OUTPATIENT)
Dept: BEHAVIORAL/MENTAL HEALTH CLINIC | Facility: CLINIC | Age: 53
End: 2023-07-19
Payer: COMMERCIAL

## 2023-07-19 DIAGNOSIS — F33.0 MAJOR DEPRESSIVE DISORDER, RECURRENT, MILD (HCC): ICD-10-CM

## 2023-07-19 DIAGNOSIS — F41.1 GENERALIZED ANXIETY DISORDER: Primary | ICD-10-CM

## 2023-07-19 PROCEDURE — 90837 PSYTX W PT 60 MINUTES: CPT | Performed by: SOCIAL WORKER

## 2023-07-20 NOTE — PSYCH
Virtual Regular Visit    Verification of patient location:    Patient is located at Home in the following state in which I hold an active license [place]; [place] working towards [place] lic[place]       Assessment/Plan:    Problem List Items Addressed This Visit    None  Visit Diagnoses     Generalized anxiety disorder    -  Primary    Major depressive disorder, recurrent, mild ([address])              Goals addressed in session: Goal 1          Reason for visit is   Chief Complaint   Patient presents with   • Virtual Regular Visit        Encounter provider Bonilla Corona    Provider located at [address] [place] [address]  [phone]      Recent Visits  No visits were found meeting these conditions. Showing recent visits within past 7 days and meeting all other requirements  Today's Visits  Date Type Provider Dept   07/19/23 Ariel Therapist [facility]   Showing today's visits and meeting all other requirements  Future Appointments  No visits were found meeting these conditions. Showing future appointments within next 150 days and meeting all other requirements       The patient was identified by name and date of birth. Arina Pride was informed that this is a telemedicine visit and [facility] She agrees to proceed. .  My office door was closed. No one else was in the room. She acknowledged consent and understanding of privacy and security of the video platform. The patient has agreed to participate and understands they can discontinue the visit at any time. Patient is aware this is a billable service. Subjective  Arina Pride is a 50 y.o. female . HPI     No past medical history on file. No past surgical history on file.     Current Outpatient Medications   Medication Sig Dispense Refill   • sertraline (ZOLOFT) 100 mg tablet Take 1 tablet (100 mg total) by mouth daily 30 tablet 1     No current facility-administered medications for this visit. Not on File    Review of Systems    Video Exam    There were no vitals filed for this visit. Physical Exam     Behavioral Health Psychotherapy Progress Note    Psychotherapy Provided: Individual Psychotherapy     1. Generalized anxiety disorder        2. Major depressive disorder, recurrent, mild ([address])            Goals addressed in session: Goal 1     DATA: Client presented for a telehealth session via [facility]. Client reported that this past Friday, her  was laid off from work. She mentioned that work was slowing down, and he was expecting this decision to take place. However, client worries about their financial situation. Client highlighted that her  had recently interviewed at a place he worked at before. She is staying hopeful that they will bring him back on board. In the meantime, client's  has been working on some projects around the home. Client mentioned that her daughter's birthday was on Monday; she turned 25 and her son's birthday is on Friday; he will be 23. Client voiced that they will be celebrating both birthdays this weekend. Client talked in-length about the death of her dad; will almost be two years since his passing and how she struggles to find motivation to do things. Clinician actively listened, provided emotional support, validated client's concerns and explored ways to process grief (stages of grief). During this session, this clinician used the following therapeutic modalities: Bereavement Therapy, Client-centered Therapy and Cognitive Behavioral Therapy    Substance Abuse was not addressed during this session. If the client is diagnosed with a co-occurring substance use disorder, please indicate any changes in the frequency or amount of use: N/A.  Stage of change for addressing substance use diagnoses: No substance use/Not applicable    ASSESSMENT:  Hair Morrison presents with a Euthymic/ normal mood. her affect is Normal range and intensity, which is congruent, with her mood and the content of the session. The client has made progress on their goals. Hiar Morrison presents with a low risk of suicide, low risk of self-harm, and low risk of harm to others. For any risk assessment that surpasses a "low" rating, a safety plan must be developed. A safety plan was indicated: no  If yes, describe in detail N/A    PLAN: Between sessions, Hair Morrison will continue to utilize coping skills to manage stress/anxiety. At the next session, the therapist will use Client-centered Therapy and Cognitive Behavioral Therapy to address anxiety. Behavioral Health Treatment Plan and Discharge Planning: Hair Morrison is aware of and agrees to continue to work on their treatment plan. They have identified and are working toward their discharge goals.  yes    Visit start and stop times:    07/19/23  Start Time: 0802  Stop Time: 0900  Total Visit Time: 58 minutes

## 2023-08-02 ENCOUNTER — TELEMEDICINE (OUTPATIENT)
Dept: BEHAVIORAL/MENTAL HEALTH CLINIC | Facility: CLINIC | Age: 53
End: 2023-08-02
Payer: COMMERCIAL

## 2023-08-02 DIAGNOSIS — F41.1 GENERALIZED ANXIETY DISORDER: Primary | ICD-10-CM

## 2023-08-02 PROCEDURE — 90837 PSYTX W PT 60 MINUTES: CPT | Performed by: SOCIAL WORKER

## 2023-08-02 NOTE — PSYCH
Virtual Regular Visit    Verification of patient location:    Patient is located at Home in the following state in which I hold an active license [place]; [place]       Assessment/Plan:    Problem List Items Addressed This Visit    None  Visit Diagnoses     Generalized anxiety disorder    -  Primary          Goals addressed in session: Goal 1          Reason for visit is   Chief Complaint   Patient presents with   • Virtual Regular Visit        Encounter provider Latisha Purvis    Provider located at [address] [facility]  [address]  [phone]      Recent Visits  No visits were found meeting these conditions. Showing recent visits within past 7 days and meeting all other requirements  Today's Visits  Date Type Provider Dept   08/02/23 Telemedicine [facility] Therapist [facility]   Showing today's visits and meeting all other requirements  Future Appointments  No visits were found meeting these conditions. Showing future appointments within next 150 days and meeting all other requirements       The patient was identified by name and date of birth. Luis Ojeda was informed that this is a telemedicine visit and that [facility] [email or web address]. She agrees to proceed. .  My office door was closed. No one else was in the room. She acknowledged consent and understanding of privacy and security of the video platform. The patient has agreed to participate and understands they can discontinue the visit at any time. Patient is aware this is a billable service. Subjective  Luis Ojeda is a 50 y.o. female . HPI     No past medical history on file. No past surgical history on file.     Current Outpatient Medications   Medication Sig Dispense Refill   • sertraline (ZOLOFT) 100 mg tablet Take 1 tablet (100 mg total) by mouth daily 30 tablet 1     No current facility-administered medications for this visit. Not on File    Review of Systems    Video Exam    There were no vitals filed for this visit. Physical Exam     Behavioral Health Psychotherapy Progress Note    Psychotherapy Provided: Individual Psychotherapy     1. Generalized anxiety disorder            Goals addressed in session: Goal 1     DATA: Client presented for a telehealth session via [employer]. Client reported that she had an argument with her  and how it stemmed from being critical with themselves. She shared that the kids; both hers and his have been doing chores around the house to contribute. She mentioned her one daughter pays rent instead of doing chores, due to her not being home often. Client addressed that they had issues with their children and not being on the same page when it comes to parenting. Client spoke about her  getting frustrated and saying things like "I am always cleaning up around the house." Client voiced that this comment had upset her, stating that she does try and do her part around the home, whether the kids are home or not. Client addressed that they have conflict with disciplining the other spouses kids. Client noted that her  has a hard time  her from his ex, due to his ex being in the picture. Client also highlighted that some of the kids were away, so they were able to have a date night. Client emphasized that she has also been taking time to schedule appointments for herself. Clinician actively listened, provided emotional support, validated client's feelings, discussed the challenges with blended families and how to manage disciplining, explored communication styles and emphasized the importance of self-care and taking time for each other.    During this session, this clinician used the following therapeutic modalities: Client-centered Therapy and Cognitive Behavioral Therapy    Substance Abuse was not addressed during this session. If the client is diagnosed with a co-occurring substance use disorder, please indicate any changes in the frequency or amount of use: N/A. Stage of change for addressing substance use diagnoses: No substance use/Not applicable    ASSESSMENT:  Jennifer Dan presents with a Euthymic/ normal mood. her affect is Normal range and intensity, which is congruent, with her mood and the content of the session. The client has made progress on their goals. Jennifer Dan presents with a low risk of suicide, low risk of self-harm, and low risk of harm to others. For any risk assessment that surpasses a "low" rating, a safety plan must be developed. A safety plan was indicated: no  If yes, describe in detail N/A    PLAN: Between sessions, Jennifer Dan will continue to utilize coping skills to manage stress/anxiety. At the next session, the therapist will use Client-centered Therapy and Cognitive Behavioral Therapy to address anxiety. Behavioral Health Treatment Plan and Discharge Planning: Jennifer Dan is aware of and agrees to continue to work on their treatment plan. They have identified and are working toward their discharge goals.  yes    Visit start and stop times:    08/02/23  Start Time: 0801  Stop Time: 0900  Total Visit Time: 59 minutes

## 2023-08-07 LAB — HBA1C MFR BLD HPLC: 7.6 %

## 2023-08-16 ENCOUNTER — TELEMEDICINE (OUTPATIENT)
Dept: BEHAVIORAL/MENTAL HEALTH CLINIC | Facility: CLINIC | Age: 53
End: 2023-08-16
Payer: COMMERCIAL

## 2023-08-16 ENCOUNTER — TELEMEDICINE (OUTPATIENT)
Dept: PSYCHIATRY | Facility: CLINIC | Age: 53
End: 2023-08-16
Payer: COMMERCIAL

## 2023-08-16 DIAGNOSIS — F41.1 GENERALIZED ANXIETY DISORDER: Primary | ICD-10-CM

## 2023-08-16 DIAGNOSIS — F41.1 GENERALIZED ANXIETY DISORDER: ICD-10-CM

## 2023-08-16 DIAGNOSIS — F32.A DEPRESSION, UNSPECIFIED DEPRESSION TYPE: Primary | ICD-10-CM

## 2023-08-16 PROCEDURE — 90837 PSYTX W PT 60 MINUTES: CPT | Performed by: SOCIAL WORKER

## 2023-08-16 PROCEDURE — 99214 OFFICE O/P EST MOD 30 MIN: CPT | Performed by: PSYCHIATRY & NEUROLOGY

## 2023-08-16 RX ORDER — SERTRALINE HYDROCHLORIDE 100 MG/1
100 TABLET, FILM COATED ORAL DAILY
Qty: 30 TABLET | Refills: 1 | Status: SHIPPED | OUTPATIENT
Start: 2023-08-16

## 2023-08-16 RX ORDER — METFORMIN HYDROCHLORIDE 500 MG/1
TABLET, EXTENDED RELEASE ORAL
COMMUNITY
Start: 2023-08-09

## 2023-08-16 NOTE — PSYCH
Virtual Regular Visit    Verification of patient location:    Patient is located at Home in the following state in which I hold an active license [place]      Assessment/Plan:    Problem List Items Addressed This Visit    None      Goals addressed in session: Goal 1          Reason for visit is   Chief Complaint   Patient presents with   • Medication Management        Encounter provider Nicolás Clark MD    Provider located at [address] [place] [address],[address]  [address]  [phone]      Recent Visits  No visits were found meeting these conditions. Showing recent visits within past 7 days and meeting all other requirements  Today's Visits  Date Type Provider Dept   08/16/23 Telemedicine Nicolás Clark, [address] today's visits and meeting all other requirements  Future Appointments  No visits were found meeting these conditions. Showing future appointments within next 150 days and meeting all other requirements       The patient was identified by name and date of birth. Julia Powers was informed that this is a telemedicine visit and [facility] [employer]. She agrees to proceed. .  My office door was closed. No one else was in the room. She acknowledged consent and understanding of privacy and security of the video platform. The patient has agreed to participate and understands they can discontinue the visit at any time. Patient is aware this is a billable service. Subjective  Julia Powers is a 50 y.o. female with depression and anxiety presents for regular f/u   compliant with meds, denies SE .   Recent check up and blood work with PCP - dx with DM ( on metformin) and low vit D ( on supplements)  Pt is currently sick with COVID   - was in a hospital, back home    HPI   Mood - upset about health problems; low energy; limited functioning ( sick)  Anxiety - " same"    History reviewed. No pertinent past medical history. History reviewed. No pertinent surgical history. Current Outpatient Medications   Medication Sig Dispense Refill   • metFORMIN (GLUCOPHAGE-XR) 500 mg 24 hr tablet      • sertraline (ZOLOFT) 100 mg tablet Take 1 tablet (100 mg total) by mouth daily 30 tablet 1     No current facility-administered medications for this visit. Not on File    Review of Systems   Constitutional: Positive for activity change (less active). Negative for appetite change. Respiratory:        COVID   Psychiatric/Behavioral: Positive for dysphoric mood. Negative for sleep disturbance and suicidal ideas. The patient is not nervous/anxious. Video Exam    There were no vitals filed for this visit. Physical Exam  Constitutional:       Appearance: Normal appearance. She is normal weight. Neurological:      Mental Status: She is alert. Psychiatric:         Attention and Perception: Attention and perception normal.         Mood and Affect: Mood is depressed. Affect is blunt. Speech: Speech normal.         Behavior: Behavior normal. Behavior is cooperative. Thought Content:  Thought content normal.         Judgment: Judgment normal.          Visit Time    Visit Start Time: 9.58 am   Visit Stop Time: 10.07 am   Total Visit Duration: 17 minutes
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
## 2023-08-30 ENCOUNTER — TELEMEDICINE (OUTPATIENT)
Dept: BEHAVIORAL/MENTAL HEALTH CLINIC | Facility: CLINIC | Age: 53
End: 2023-08-30
Payer: COMMERCIAL

## 2023-08-30 DIAGNOSIS — F41.1 GENERALIZED ANXIETY DISORDER: Primary | ICD-10-CM

## 2023-08-30 PROCEDURE — 90837 PSYTX W PT 60 MINUTES: CPT | Performed by: SOCIAL WORKER

## 2023-08-30 NOTE — PSYCH
Virtual Regular Visit    Verification of patient location:    Patient is located at Home in the following state in which I hold an active license Other; [place]       Assessment/Plan:    Problem List Items Addressed This Visit    None  Visit Diagnoses     Generalized anxiety disorder    -  Primary          Goals addressed in session: Goal 1          Reason for visit is   Chief Complaint   Patient presents with   • Virtual Regular Visit        Encounter provider Kasandra Neves    Provider located at [id number] [place] [address]  [address] [place] [address]  [phone]      Recent Visits  No visits were found meeting these conditions. Showing recent visits within past 7 days and meeting all other requirements  Today's Visits  Date Type Provider Dept   08/30/23 Telemedicine 350 [facility] Therapist [facility]   Showing today's visits and meeting all other requirements  Future Appointments  No visits were found meeting these conditions. Showing future appointments within next 150 days and meeting all other requirements       The patient was identified by name and date of birth. Oj Valdes was informed that this is a telemedicine visit and [facility] She agrees to proceed. .  My office door was closed. No one else was in the room. She acknowledged consent and understanding of privacy and security of the video platform. The patient has agreed to participate and understands they can discontinue the visit at any time. Patient is aware this is a billable service. Subjective  Oj Valdes is a 50 y.o. female . HPI     No past medical history on file. No past surgical history on file.     Current Outpatient Medications   Medication Sig Dispense Refill   • metFORMIN (GLUCOPHAGE-XR) 500 mg 24 hr tablet      • sertraline (ZOLOFT) 100 mg tablet Take 1 tablet (100 mg total) by mouth daily 30 tablet 1     No current facility-administered medications for this visit. Not on File    Review of Systems    Video Exam    There were no vitals filed for this visit. Physical Exam     Behavioral Health Psychotherapy Progress Note    Psychotherapy Provided: Individual Psychotherapy     1. Generalized anxiety disorder            Goals addressed in session: Goal 1     DATA: Client presented for a telehealth session via [facility]. Client reported that she has been managing her diabetes, stating that she is trying to be mindful what she eats. She mentioned that her  is also diabetic (has been for the last 10+ years) and how he has been supporting her. Client expressed that her youngest son started the sixth grade yesterday and how regimented and prepared he has been. She voiced that he had gotten up early to prepare his lunches and got himself to the bus stop on time. She noted that her other children were not like this when they went to school. Client addressed that she had taken the last week to schedule appointments for herself. She voiced that she scheduled a mammogram, OBGYN, dermatology and connected with a hand and foot specialist. 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 She highlighted that her work had liked some of the ideas she introduced for the upcoming season and plan to implement them. Client spoke briefly about her family and how they have been experiencing some ups and downs. Clinician actively listened, provided emotional support, validated client's feelings, explored coping skills, discussed and processed family dynamics. During this session, this clinician used the following therapeutic modalities: Client-centered Therapy and Cognitive Behavioral Therapy    Substance Abuse was not addressed during this session.  If the client is diagnosed with a co-occurring substance use disorder, please indicate any changes in the frequency or amount of use: N/A. Stage of change for addressing substance use diagnoses: No substance use/Not applicable    ASSESSMENT:  Stephani Salazar presents with a Euthymic/ normal mood. her affect is Normal range and intensity, which is congruent, with her mood and the content of the session. The client has made progress on their goals. Stephani Salazar presents with a low risk of suicide, low risk of self-harm, and low risk of harm to others. For any risk assessment that surpasses a "low" rating, a safety plan must be developed. A safety plan was indicated: no  If yes, describe in detail N/A    PLAN: Between sessions, Stephani Salazar will continue to utilize coping skills to manage anxiety. At the next session, the therapist will use Client-centered Therapy and Cognitive Behavioral Therapy to address anxiety. Behavioral Health Treatment Plan and Discharge Planning: Stephani Salazar is aware of and agrees to continue to work on their treatment plan. They have identified and are working toward their discharge goals.  yes    Visit start and stop times:    08/30/23  Start Time: 0802  Stop Time: 0900  Total Visit Time: 58 minutes

## 2023-09-13 ENCOUNTER — TELEMEDICINE (OUTPATIENT)
Dept: BEHAVIORAL/MENTAL HEALTH CLINIC | Facility: CLINIC | Age: 53
End: 2023-09-13
Payer: COMMERCIAL

## 2023-09-13 DIAGNOSIS — F41.1 GENERALIZED ANXIETY DISORDER: Primary | ICD-10-CM

## 2023-09-13 PROCEDURE — 90837 PSYTX W PT 60 MINUTES: CPT | Performed by: SOCIAL WORKER

## 2023-09-13 NOTE — PSYCH
Virtual Regular Visit    Verification of patient location:    Patient is located at Home in the following state in which I hold an active license Other; [place]      Assessment/Plan:    Problem List Items Addressed This Visit    None  Visit Diagnoses     Generalized anxiety disorder    -  Primary          Goals addressed in session: Goal 1          Reason for visit is   Chief Complaint   Patient presents with   • Virtual Regular Visit        Encounter provider Ita Tapia    Provider located at [id number] [place] [address]  [address] [facility] [address] [place] [address]  [phone]      Recent Visits  No visits were found meeting these conditions. Showing recent visits within past 7 days and meeting all other requirements  Today's Visits  Date Type Provider Dept   09/13/23 Telemedicine [address] [facility] [address] Therapist [facility]   Showing today's visits and meeting all other requirements  Future Appointments  No visits were found meeting these conditions. Showing future appointments within next 150 days and meeting all other requirements       The patient was identified by name and date of birth. Anil Greco was informed that this is a telemedicine visit and [facility] She agrees to proceed. .  My office door was closed. No one else was in the room. She acknowledged consent and understanding of privacy and security of the video platform. The patient has agreed to participate and understands they can discontinue the visit at any time. Patient is aware this is a billable service. Subjective  Anil Greco is a 50 y.o. female . HPI     No past medical history on file. No past surgical history on file.     Current Outpatient Medications   Medication Sig Dispense Refill   • metFORMIN (GLUCOPHAGE-XR) 500 mg 24 hr tablet      • sertraline (ZOLOFT) 100 mg tablet Take 1 tablet (100 mg total) by mouth daily 30 tablet 1     No current facility-administered medications for this visit. Not on File    Review of Systems    Video Exam    There were no vitals filed for this visit. Physical Exam     Behavioral Health Psychotherapy Progress Note    Psychotherapy Provided: Individual Psychotherapy     1. Generalized anxiety disorder            Goals addressed in session: Goal 1     DATA: Client presented for a telehealth session via [facility] [facility]. Client reported that her youngest son is doing really well in 6th grade. She mentioned that he is getting up on his own and doing what he needs to, to get ready for school. She shared that she does need to talk to him about his phone being a distraction when he is getting ready. Client talked about her daughter getting a car, which has helped her be more independent. Client will be going to the [employer] later today to help with byron. Client expressed that she has been doing her best to focus on her needs (scheduling appointments for herself). She addressed that she has a mammogram later this evening. Client voiced that she and her kids are planning a trip to her family's cabin at some point. Her  will be traveling to [place] in October with his kids to see a friend. Client disclosed that she scheduled to see an orthopedic and discussed the need to get an EMG test. She emphasized how this was the test her father received the day he was diagnosed with ALS. Client is concerned that she will have a similar outcome. Client talked in-length regarding her  and the communication issues/concerns she has been having with him.  Clinician actively listened, provided emotional support, validated client's feelings, processed the importance of getting tested and knowing family history, discussed communication styles (will send client handout pertaining to topic) and encouraged client to review with her , explored coping skills. During this session, this clinician used the following therapeutic modalities: Client-centered Therapy and Cognitive Behavioral Therapy    Substance Abuse was not addressed during this session. If the client is diagnosed with a co-occurring substance use disorder, please indicate any changes in the frequency or amount of use: N/A. Stage of change for addressing substance use diagnoses: No substance use/Not applicable    ASSESSMENT:  Low Valdivia presents with a Euthymic/ normal mood. her affect is Normal range and intensity, which is congruent, with her mood and the content of the session. The client has made progress on their goals. Low Valdivia presents with a low risk of suicide, low risk of self-harm, and low risk of harm to others. For any risk assessment that surpasses a "low" rating, a safety plan must be developed. A safety plan was indicated: no  If yes, describe in detail N/A    PLAN: Between sessions, Low Valdivia will continue to utilize coping skills to manage stress/anxiety. At the next session, the therapist will use Client-centered Therapy and Cognitive Behavioral Therapy to address anxiety. Behavioral Health Treatment Plan and Discharge Planning: Low Valdivia is aware of and agrees to continue to work on their treatment plan. They have identified and are working toward their discharge goals.  yes    Visit start and stop times:    09/13/23  Start Time: 0801  Stop Time: 1322  Total Visit Time: 58 minutes

## 2023-09-27 ENCOUNTER — TELEMEDICINE (OUTPATIENT)
Dept: BEHAVIORAL/MENTAL HEALTH CLINIC | Facility: CLINIC | Age: 53
End: 2023-09-27
Payer: COMMERCIAL

## 2023-09-27 DIAGNOSIS — F41.1 GENERALIZED ANXIETY DISORDER: Primary | ICD-10-CM

## 2023-09-27 PROCEDURE — 90837 PSYTX W PT 60 MINUTES: CPT | Performed by: SOCIAL WORKER

## 2023-09-27 NOTE — PSYCH
Virtual Regular Visit    Verification of patient location:    Patient is located at Home in the following state in which I hold an active license Other; [place]      Assessment/Plan:    Problem List Items Addressed This Visit    None  Visit Diagnoses     Generalized anxiety disorder    -  Primary          Goals addressed in session: Goal 1          Reason for visit is   Chief Complaint   Patient presents with   • Virtual Regular Visit        Encounter provider Eve Parisi    Provider located at [address] [facility]  [address]  [phone]      Recent Visits  No visits were found meeting these conditions. Showing recent visits within past 7 days and meeting all other requirements  Today's Visits  Date Type Provider Dept   09/27/23 Telemedicine [address] [facility] [address] Therapist [facility]   Showing today's visits and meeting all other requirements  Future Appointments  No visits were found meeting these conditions. Showing future appointments within next 150 days and meeting all other requirements       The patient was identified by name and date of birth. Tomas Henry was informed that this is a telemedicine visit and [facility] She agrees to proceed. .  My office door was closed. No one else was in the room. She acknowledged consent and understanding of privacy and security of the video platform. The patient has agreed to participate and understands they can discontinue the visit at any time. Patient is aware this is a billable service. Subjective  Tomas Henry is a 50 y.o. female . HPI     No past medical history on file. No past surgical history on file.     Current Outpatient Medications   Medication Sig Dispense Refill   • metFORMIN (GLUCOPHAGE-XR) 500 mg 24 hr tablet      • sertraline (ZOLOFT) 100 mg tablet Take 1 tablet (100 mg total) by mouth daily 30 tablet 1     No current facility-administered medications for this visit. Not on File    Review of Systems    Video Exam    There were no vitals filed for this visit. Physical Exam     Behavioral Health Psychotherapy Progress Note    Psychotherapy Provided: Individual Psychotherapy     1. Generalized anxiety disorder            Goals addressed in session: Goal 1     DATA: Client presented for a telehealth session via [address] [place] [address]. Client reported that she had her mammogram, in which she received a callback to come in for an ultrasound. Client expressed that her  had gone with her, stating that she was nervous for the results of the test. She did not tell her kids that she was getting this done, noting that she did not want to worry them. Client spoke to the doctor who reviewed the results with her and luckily, there are no concerns. Client will be getting carpel tunnel surgery next week, addressing that she will have to miss about five days of work. Client voiced that she will be limited to what she can do (she cannot carry/lift more than a certain weight). Client will have localized anesthesia. Client will be meeting with her endocrinologist next week to discuss her diabetes management. Client highlighted that since she had adjusted her diet, she has been noticing that she looks and feels thinner. Client talked about feeling better about herself, which has been something she struggled with. Client had her EMG test and during the time, she was thinking about her dad. This was the test that diagnosed him with ALS. Client shared that the test came back normal, other than needing to get carpel tunnel surgery. Client emphasized that she has been doing well overall. She mentioned that although she is scheduling several doctors appointments for herself, she is prioritizing her health.  Clinician actively listened, provided emotional support, validated client's feelings, was encouraged to hear that she has been taking care of her health needs and explored coping skills. During this session, this clinician used the following therapeutic modalities: Client-centered Therapy and Cognitive Behavioral Therapy    Substance Abuse was not addressed during this session. If the client is diagnosed with a co-occurring substance use disorder, please indicate any changes in the frequency or amount of use: N/A. Stage of change for addressing substance use diagnoses: No substance use/Not applicable    ASSESSMENT:  Jin Wright presents with a Euthymic/ normal mood. her affect is Normal range and intensity, which is congruent, with her mood and the content of the session. The client has made progress on their goals. Jin Wright presents with a low risk of suicide, low risk of self-harm, and low risk of harm to others. For any risk assessment that surpasses a "low" rating, a safety plan must be developed. A safety plan was indicated: no  If yes, describe in detail N/A    PLAN: Between sessions, Jin Wright will continue to utilize coping skills to manage stress/anxiety. At the next session, the therapist will use Client-centered Therapy and Cognitive Behavioral Therapy to address anxiety. Behavioral Health Treatment Plan and Discharge Planning: Jin Wright is aware of and agrees to continue to work on their treatment plan. They have identified and are working toward their discharge goals.  yes    Visit start and stop times:    09/27/23  Start Time: 0802  Stop Time: 0900  Total Visit Time: 58 minutes

## 2023-09-28 ENCOUNTER — TELEPHONE (OUTPATIENT)
Age: 53
End: 2023-09-28

## 2023-09-28 NOTE — TELEPHONE ENCOUNTER
Rec'd call from patient requesting NEW for FULL BODY / [address], [address] - 2 suspicious. Explained wait/cancellation list processes and MyChart notification. Understanding was verbalized. Created wait/cancellation list for patient.

## 2023-10-05 LAB
COLOGUARD RESULT REPORTABLE: NEGATIVE
EXTERNAL COLOGUARD RESULT: NEGATIVE

## 2023-10-10 ENCOUNTER — TELEMEDICINE (OUTPATIENT)
Dept: PSYCHIATRY | Facility: CLINIC | Age: 53
End: 2023-10-10
Payer: COMMERCIAL

## 2023-10-10 DIAGNOSIS — F32.A DEPRESSION, UNSPECIFIED DEPRESSION TYPE: Primary | ICD-10-CM

## 2023-10-10 DIAGNOSIS — F41.1 GENERALIZED ANXIETY DISORDER: ICD-10-CM

## 2023-10-10 PROCEDURE — 99214 OFFICE O/P EST MOD 30 MIN: CPT | Performed by: PSYCHIATRY & NEUROLOGY

## 2023-10-10 RX ORDER — SERTRALINE HYDROCHLORIDE 100 MG/1
100 TABLET, FILM COATED ORAL DAILY
Qty: 30 TABLET | Refills: 2 | Status: SHIPPED | OUTPATIENT
Start: 2023-10-10

## 2023-10-10 NOTE — PSYCH
Virtual Regular Visit    Verification of patient location:    Patient is located at Home in the following state in which I hold an active license [place]      Assessment/Plan:    Problem List Items Addressed This Visit    None      Goals addressed in session: Goal 1          Reason for visit is   Chief Complaint   Patient presents with   • Medication Management        Encounter provider Dorys Wang MD    Provider located at [address] [place] [address],[address]  [address] [place] [address]  [phone]      Recent Visits  No visits were found meeting these conditions. Showing recent visits within past 7 days and meeting all other requirements  Today's Visits  Date Type Provider Dept   10/10/23 Telemedicine Dorys Wang, [address] today's visits and meeting all other requirements  Future Appointments  No visits were found meeting these conditions. Showing future appointments within next 150 days and meeting all other requirements       The patient was identified by name and date of birth. Erlinda Garcia was informed that this is a telemedicine visit and [facility] She agrees to proceed. .  My office door was closed. No one else was in the room. She acknowledged consent and understanding of privacy and security of the video platform. The patient has agreed to participate and understands they can discontinue the visit at any time. Patient is aware this is a billable service. Subjective  Erlinda Garcia is a 50 y.o. female with depression and anxiety presents for regular f/u  . Compliant with meds, denies SE  DM on meds; preparing for carpal tunnel surgery; cardio w/u for abnormal ECG  Lost 10 lbs        HPI   Mood - reports as mostly good and stable; good energy; social and active  Anxiety - worries about surgery - " manageable"    History reviewed.  No pertinent past medical history. History reviewed. No pertinent surgical history. Current Outpatient Medications   Medication Sig Dispense Refill   • sertraline (ZOLOFT) 100 mg tablet Take 1 tablet (100 mg total) by mouth daily 30 tablet 1   • metFORMIN (GLUCOPHAGE-XR) 500 mg 24 hr tablet        No current facility-administered medications for this visit. Not on File    Review of Systems   Constitutional: Negative for activity change and appetite change. Psychiatric/Behavioral: Negative for dysphoric mood, sleep disturbance and suicidal ideas. The patient is not nervous/anxious. Video Exam    There were no vitals filed for this visit. Physical Exam  Constitutional:       Appearance: Normal appearance. Neurological:      Mental Status: She is alert. Psychiatric:         Attention and Perception: Attention and perception normal.         Mood and Affect: Mood and affect normal.         Speech: Speech normal.         Behavior: Behavior normal. Behavior is cooperative. Thought Content:  Thought content normal.         Judgment: Judgment normal.          Visit Time    Visit Start Time: 7.57 am   Visit Stop Time: 8.08 am  Total Visit Duration: 18 minutes

## 2023-10-11 ENCOUNTER — TELEMEDICINE (OUTPATIENT)
Dept: BEHAVIORAL/MENTAL HEALTH CLINIC | Facility: CLINIC | Age: 53
End: 2023-10-11
Payer: COMMERCIAL

## 2023-10-11 DIAGNOSIS — F33.0 MAJOR DEPRESSIVE DISORDER, RECURRENT, MILD (HCC): ICD-10-CM

## 2023-10-11 DIAGNOSIS — F41.1 GENERALIZED ANXIETY DISORDER: Primary | ICD-10-CM

## 2023-10-11 PROCEDURE — 90834 PSYTX W PT 45 MINUTES: CPT | Performed by: SOCIAL WORKER

## 2023-10-25 ENCOUNTER — TELEMEDICINE (OUTPATIENT)
Dept: BEHAVIORAL/MENTAL HEALTH CLINIC | Facility: CLINIC | Age: 53
End: 2023-10-25
Payer: COMMERCIAL

## 2023-10-25 DIAGNOSIS — F41.1 GENERALIZED ANXIETY DISORDER: Primary | ICD-10-CM

## 2023-10-25 PROCEDURE — 90834 PSYTX W PT 45 MINUTES: CPT | Performed by: SOCIAL WORKER

## 2023-10-25 NOTE — PSYCH
Virtual Regular Visit    Verification of patient location:    Patient is located at Home in the following state in which I hold an active license [place]; Currently working towards [place] licensure      Assessment/Plan:    Problem List Items Addressed This Visit    None  Visit Diagnoses       Generalized anxiety disorder    -  Primary            Goals addressed in session: Goal 1          Reason for visit is   Chief Complaint   Patient presents with    Virtual Regular Visit          Encounter provider Kaya Cheema    Provider located at [address] [place] [address]  [phone]      Recent Visits  No visits were found meeting these conditions. Showing recent visits within past 7 days and meeting all other requirements  Today's Visits  Date Type Provider Dept   10/25/23 Telemedicine [facility] Therapist [facility]   Showing today's visits and meeting all other requirements  Future Appointments  No visits were found meeting these conditions. Showing future appointments within next 150 days and meeting all other requirements       The patient was identified by name and date of birth. Susana Munroe was informed that this is a telemedicine visit and [facility] She agrees to proceed. .  My office door was closed. No one else was in the room. She acknowledged consent and understanding of privacy and security of the video platform. The patient has agreed to participate and understands they can discontinue the visit at any time. Patient is aware this is a billable service. Subjective  Susana Munroe is a 50 y.o. female . HPI     No past medical history on file. No past surgical history on file.     Current Outpatient Medications   Medication Sig Dispense Refill    metFORMIN (GLUCOPHAGE-XR) 500 mg 24 hr tablet       sertraline (ZOLOFT) 100 mg tablet Take 1 tablet (100 mg total) by mouth daily 30 tablet 2     No current facility-administered medications for this visit. Not on File    Review of Systems    Video Exam    There were no vitals filed for this visit. Physical Exam     Behavioral Health Psychotherapy Progress Note    Psychotherapy Provided: Individual Psychotherapy     1. Generalized anxiety disorder            Goals addressed in session: Goal 1     DATA: Client presented for a telehealth session via [facility]. Client reported that she has been wearing a heart monitor since last week. She mentioned getting it removed tomorrow to discuss results. Client voiced that her mom and sister are away this week, she is planning to can tomorrow after returning the heart monitor and how she has been working a lot. She voiced that she will be celebrating her stepdaughter's birthday tonight since she will be with her mom's tomorrow for her actual birthday. Client talked about losing weight and being more mindful of her diet. She emphasized the importance of keeping track of her diet to manage her diabetes. Client noted that she has been tired and is seeking a sleep study to get this under control. Client spoke in-length around some communication issues she has been having with her  and how she would like to work on this moving forward. Clinician had sent her communication handouts last session that she will revisit and discuss next session. Clinician actively listened, provided emotional support, validated client's feelings, addressed her concerns and was encouraged to hear that she is taking care of her physical health and will move forward with supporting her around communication with her . During this session, this clinician used the following therapeutic modalities: Client-centered Therapy, Cognitive Behavioral Therapy, and Supportive Psychotherapy    Substance Abuse was not addressed during this session.  If the client is diagnosed with a co-occurring substance use disorder, please indicate any changes in the frequency or amount of use: N/A. Stage of change for addressing substance use diagnoses: No substance use/Not applicable    ASSESSMENT:  Sidney Ray presents with a Euthymic/ normal mood. her affect is Normal range and intensity, which is congruent, with her mood and the content of the session. The client has made progress on their goals. Sidney Ray presents with a low risk of suicide, low risk of self-harm, and low risk of harm to others. For any risk assessment that surpasses a "low" rating, a safety plan must be developed. A safety plan was indicated: no  If yes, describe in detail N/A    PLAN: Between sessions, Sidney Ray will continue to utilize coping skills to manage stress/anxiety. At the next session, the therapist will use Client-centered Therapy, Cognitive Behavioral Therapy, and Supportive Psychotherapy to address anxiety and learn communication skills. Behavioral Health Treatment Plan and Discharge Planning: Sidney Ray is aware of and agrees to continue to work on their treatment plan. They have identified and are working toward their discharge goals.  yes    Visit start and stop times:    10/25/23  Start Time: 0804  Stop Time: 1389  Total Visit Time: 52 minutes

## 2023-10-31 ENCOUNTER — ANNUAL EXAM (OUTPATIENT)
Dept: OBGYN CLINIC | Facility: CLINIC | Age: 53
End: 2023-10-31
Payer: COMMERCIAL

## 2023-10-31 VITALS
DIASTOLIC BLOOD PRESSURE: 78 MMHG | HEIGHT: 66 IN | WEIGHT: 241 LBS | SYSTOLIC BLOOD PRESSURE: 118 MMHG | BODY MASS INDEX: 38.73 KG/M2

## 2023-10-31 DIAGNOSIS — Z01.419 ROUTINE GYNECOLOGICAL EXAMINATION: Primary | ICD-10-CM

## 2023-10-31 DIAGNOSIS — Z12.4 SCREENING FOR MALIGNANT NEOPLASM OF THE CERVIX: ICD-10-CM

## 2023-10-31 DIAGNOSIS — Z12.31 ENCOUNTER FOR SCREENING MAMMOGRAM FOR MALIGNANT NEOPLASM OF BREAST: ICD-10-CM

## 2023-10-31 PROCEDURE — 99386 PREV VISIT NEW AGE 40-64: CPT | Performed by: OBSTETRICS & GYNECOLOGY

## 2023-10-31 RX ORDER — LANCETS
EACH MISCELLANEOUS
COMMUNITY
Start: 2023-08-09

## 2023-10-31 RX ORDER — PERPHENAZINE 16 MG/1
TABLET, FILM COATED ORAL DAILY
COMMUNITY
Start: 2023-10-15

## 2023-10-31 RX ORDER — BLOOD-GLUCOSE METER
EACH MISCELLANEOUS
COMMUNITY
Start: 2023-08-09

## 2023-10-31 NOTE — PROGRESS NOTES
[address] [place] [address]  [address] [facility], [address], [place], [address] [facility],[address]    ASSESSMENT/PLAN: Tameka Rao is a 50 y.o. [id number] who presents for annual gynecologic exam.    Encounter for routine gynecologic examination  - Routine well woman exam completed today. - Cervical Cancer Screening: Current ASCCP Guidelines reviewed. Last Pap: Not on file today. Next Pap Due: today  - HPV Vaccination status: Not immunized  - Contraceptive counseling discussed. Current contraception: tubal  - Breast Cancer Screening: Last Mammogram Not on file, ordered  - Colorectal cancer screening was not ordered. - The following were reviewed in today's visit: breast self exam, mammography screening ordered, and menopause    Additional problems addressed during this visit:  1. Routine gynecological examination    2. Encounter for screening mammogram for malignant neoplasm of breast  -     Mammo screening bilateral w 3d & cad; Future    3. Screening for malignant neoplasm of the cervix  -     IGP, Aptima HPV, Rfx 16/18,45        CC:  Annual Gynecologic Examination    HPI: Tameka Rao is a 50 y.o. [id number] who presents for annual gynecologic examination. HPI    The following portions of the patient's history were reviewed and updated as appropriate: She  has a past medical history of Carpal tunnel syndrome, Depression, and Diabetes 1.5, managed as type 2 ([address]). She  has a past surgical history that includes  section; Hernia repair (Left); Hernia repair (Right); and Tubal ligation. Her family history includes ALS (age of onset: 68) in her father; Diabetes in her father and mother; Heart attack (age of onset: 48) in her father. She  reports that she has never smoked. She does not have any smokeless tobacco history on file. She reports current alcohol use. She reports that she does not use drugs.   Current Outpatient Medications   Medication Sig Dispense Refill    Blood Glucose Monitoring Suppl (Contour Next One) JOHN       Contour Next Test test strip daily Test as directed      Microlet Lancets MISC       metFORMIN (GLUCOPHAGE-XR) 500 mg 24 hr tablet       sertraline (ZOLOFT) 100 mg tablet Take 1 tablet (100 mg total) by mouth daily 30 tablet 2     No current facility-administered medications for this visit. She has No Known Allergies. .    Review of Systems      Objective:  /78 (BP Location: Left arm, Patient Position: Sitting, Cuff Size: Large)   Ht 5' 5.5" (1.664 m)   Wt 109 kg (241 lb)   LMP 03/01/2023 (Approximate)   BMI 39.49 kg/m²    Physical Exam      PE:  General Appearance: alert and oriented, in no acute distress. HEENT: PERRL, thyroid without masses or tenderness  Breast: No masses, tenderness, skin changes, nipple D/C or axillary or supraclavicular adenopathy  Abdomen: Soft, non-tender, non-distended, no masses, no rebound or guarding. Pelvic:       External genitalia: Normal appearance, no abnormal pigmentation, no lesions or masses. Normal Bartholin's and McIntyre's. Urinary system: Urethral meatus normal, bladder non-tender. Vaginal: normal mucosa without prolapse or lesions. Normal-appearing physiologic discharge      Cervix: Normal-appearing, well-epithelialized, no gross lesions or masses No cervical motion tenderness. Adnexa: No adnexal masses or tenderness noted. Uterus: Normal-sized, regular contour, midline, mobile, no uterine tenderness. Extremities: Normal range of motion.    Skin: normal, no rash or abnormalities  Neurologic: alert, oriented x3  Psychiatric: Appropriate affect, mood stable, cooperative with exam.

## 2023-11-04 LAB
CYTOLOGIST CVX/VAG CYTO: NORMAL
DX ICD CODE: NORMAL
HPV GENOTYPE REFLEX: NORMAL
HPV I/H RISK 4 DNA CVX QL PROBE+SIG AMP: NEGATIVE
OTHER STN SPEC: NORMAL
PATH REPORT.FINAL DX SPEC: NORMAL
SL AMB NOTE:: NORMAL
SL AMB SPECIMEN ADEQUACY: NORMAL
SL AMB TEST METHODOLOGY: NORMAL

## 2023-11-06 NOTE — PSYCH
Operative report    PreOp Diagnosis: Perirectal abscess      PostOp Diagnosis: Same      Procedure(s):  INCISION AND DRAINAGE, ABSCESS, PERIRECTAL - Wound Class: Dirty or Infected    Surgeon(s):  Neptali Galeano D.O.    Anesthesiologist/Type of Anesthesia:  Anesthesiologist: Michel Disla M.D./General    Surgical Staff:  Circulator: Rod Dey R.N.  Scrub Person: Pérez Gonsalo    Specimens removed if any:  ID Type Source Tests Collected by Time Destination   1 : Perirectal Abscess Tissue Rectal FUNGAL CULTURE, AEROBIC/ANAEROBIC CULTURE (SURGERY) Neptali Galeano D.O. 11/5/2023  2:38 PM    A : Anal Biopsy Tissue Rectal PATHOLOGY SPECIMEN Neptali Galeano D.O. 11/5/2023  2:50 PM        Estimated Blood Loss: 20cc    Findings: Posterior abscess spontaneously decompressed with rectal exam.  Approximately 20 cc of foul-smelling pus drained.    Complications: none    Indication: 65-year-old male who presented with rectal pain, urinary retention leukocytosis and CT scan consistent with low posterior perirectal abscess.    Operative report: Patient was taken to the operating room placed in supine position on the operating table.  He was placed under general anesthesia by the anesthesiologist and then we position the patient in lithotomy.  We then prepped the perineal area and draped the patient in standard fashion.  Upon initial inspection it was clear that there was purulent fluid draining out of the anus.  A half-moon retractor was inserted and we visualized what appeared to be flow of pus coming from a opening posteriorly.  On digital exam this area spontaneously decompressed.  There was mucosal defect inside the anus.  Further exam was performed no other sign of loculated fluid.  The abscess cavity was thoroughly drained.  We irrigated it well.  Hemostasis was achieved by holding pressure with Nu-Knit gauze.  We reexamined the area and noted it to be quite denuded and with thickening of one of the margins.   Virtual Regular Visit    Verification of patient location:    Patient is located at Home in the following state in which I hold an active license [place]; [place]       Assessment/Plan:    Problem List Items Addressed This Visit    None  Visit Diagnoses     Generalized anxiety disorder    -  Primary          Goals addressed in session: Goal 1          Reason for visit is   Chief Complaint   Patient presents with   • Virtual Regular Visit        Encounter provider Tony Tam    Provider located at [address] [place] [address]  [phone]      Recent Visits  Date Type Provider Dept   05/16/23 Telemedicine [place] Therapist [facility]   Showing recent visits within past 7 days and meeting all other requirements  Today's Visits  Date Type Provider Dept   05/23/23 Telemedicine [place] Therapist [facility]   Showing today's visits and meeting all other requirements  Future Appointments  No visits were found meeting these conditions  Showing future appointments within next 150 days and meeting all other requirements       The patient was identified by name and date of birth  Christina Hodgson was informed that this is a telemedicine visit and [facility]  She agrees to proceed     My office door was closed  No one else was in the room  She acknowledged consent and understanding of privacy and security of the video platform  The patient has agreed to participate and understands they can discontinue the visit at any time  Patient is aware this is a billable service  Subjective  Christina Hodgson is a 50 y o  female   HPI     No past medical history on file  No past surgical history on file      Current Outpatient Medications   Medication Sig Dispense Refill   • sertraline (ZOLOFT) "100 mg tablet Take 1 tablet (100 mg total) by mouth daily 30 tablet 1     No current facility-administered medications for this visit  Not on File    Review of Systems    Video Exam    There were no vitals filed for this visit  Physical Exam     Behavioral Health Psychotherapy Progress Note    Psychotherapy Provided: Individual Psychotherapy     1  Generalized anxiety disorder            Goals addressed in session: Goal 1     DATA: Client presented for a telehealth session via [email or web address]  Client reported that she and her mom had made it to [place] just fine  She highlighted that even though they are there for work; she has been able to go down to [place], spend time with family and checkout the [employer]  Client mentioned that although she is having a nice time, she misses her family back at home  She will be heading back home later this afternoon, to attend her son's graduation tomorrow morning  Client spoke in-length about having low self-esteem, addressing that she does not feel confident in her own skin  She voiced concerns about her body and how she thinks her  does not mean what he says, when he says she is \"beautiful and sexy  \" Clinician actively listened, provided emotional support and reflected on what she deems as beautiful and sexy  Discussed societal standards and the influence it has on people, particularly women  Highlighted that her  reassures her and does not say things just to say them  Talked about her role as a mother and how she focuses her attention on her children and her family; acknowledging that mother's tend to prioritize others before herself  During this session, this clinician used the following therapeutic modalities: Client-centered Therapy and Cognitive Behavioral Therapy    Substance Abuse was not addressed during this session   If the client is diagnosed with a co-occurring substance use disorder, please indicate any changes in the frequency " Tissue was sent for biopsy.  We then left the anus packed with Nu-Knit gauze.  The perineal area was cleaned and dried and we placed a bulky gauze dressing over the anal area and held in place with mesh undergarment.  Patient was then awakened from anesthesia and taken the postanesthesia care unit in stable condition.  The sponge, needle and instrument counts were correct at the end of the case.    11/5/2023 5:44 PM Neptali Galeano D.O.   "or amount of use: N/A  Stage of change for addressing substance use diagnoses: No substance use/Not applicable    ASSESSMENT:  Guanakito Ferrara presents with a Euthymic/ normal mood  her affect is Normal range and intensity, which is congruent, with her mood and the content of the session  The client has made progress on their goals  Guanakito Ferrara presents with a low risk of suicide, low risk of self-harm, and low risk of harm to others  For any risk assessment that surpasses a \"low\" rating, a safety plan must be developed  A safety plan was indicated: no  If yes, describe in detail N/A    PLAN: Between sessions, Guanakito Ferrara will reflect on how her  reassures her, review strengths and qualities handout and utilize coping skills to manage stress/anxiety  At the next session, the therapist will use Client-centered Therapy and Cognitive Behavioral Therapy to address anxiety  Behavioral Health Treatment Plan and Discharge Planning: Guanakito Ferrara is aware of and agrees to continue to work on their treatment plan  They have identified and are working toward their discharge goals   yes    Visit start and stop times:    05/23/23  Start Time: 0907  Stop Time: 1000  Total Visit Time: 53 minutes      "

## 2023-11-08 ENCOUNTER — TELEMEDICINE (OUTPATIENT)
Dept: BEHAVIORAL/MENTAL HEALTH CLINIC | Facility: CLINIC | Age: 53
End: 2023-11-08
Payer: COMMERCIAL

## 2023-11-08 DIAGNOSIS — F41.1 GENERALIZED ANXIETY DISORDER: Primary | ICD-10-CM

## 2023-11-08 DIAGNOSIS — F33.0 MAJOR DEPRESSIVE DISORDER, RECURRENT, MILD (HCC): ICD-10-CM

## 2023-11-08 PROCEDURE — 90837 PSYTX W PT 60 MINUTES: CPT | Performed by: SOCIAL WORKER

## 2023-11-08 NOTE — PSYCH
Virtual Regular Visit    Verification of patient location:    Patient is located at Home in the following state in which I hold an active license Other; [place]      Assessment/Plan:    Problem List Items Addressed This Visit    None  Visit Diagnoses       Generalized anxiety disorder    -  Primary    Major depressive disorder, recurrent, mild ([address])                Goals addressed in session: Goal 1          Reason for visit is   Chief Complaint   Patient presents with    Virtual Regular Visit          Encounter provider Cherry Cui    Provider located at [address] [place] [address]  [phone]      Recent Visits  No visits were found meeting these conditions. Showing recent visits within past 7 days and meeting all other requirements  Today's Visits  Date Type Provider Dept   11/08/23 Telemedicine [facility] Therapist [facility]   Showing today's visits and meeting all other requirements  Future Appointments  No visits were found meeting these conditions. Showing future appointments within next 150 days and meeting all other requirements       The patient was identified by name and date of birth. Luacs Xiao was informed that this is a telemedicine visit and [facility] She agrees to proceed. .  My office door was closed. No one else was in the room. She acknowledged consent and understanding of privacy and security of the video platform. The patient has agreed to participate and understands they can discontinue the visit at any time. Patient is aware this is a billable service. Subjective  Lucas Xiao is a 50 y.o. female .       HPI     Past Medical History:   Diagnosis Date    Carpal tunnel syndrome     Depression     Diabetes 1.5, managed as type 2 ([address])        Past Surgical History: Procedure Laterality Date     SECTION      , ,     HERNIA REPAIR Left     age 5    HERNIA REPAIR Right     age 3    TUBAL LIGATION             Current Outpatient Medications   Medication Sig Dispense Refill    Blood Glucose Monitoring Suppl (Contour Next One) JOHN       Contour Next Test test strip daily Test as directed      metFORMIN (GLUCOPHAGE-XR) 500 mg 24 hr tablet       Microlet Lancets MISC       sertraline (ZOLOFT) 100 mg tablet Take 1 tablet (100 mg total) by mouth daily 30 tablet 2     No current facility-administered medications for this visit. No Known Allergies    Review of Systems    Video Exam    There were no vitals filed for this visit. Physical Exam     Behavioral Health Psychotherapy Progress Note    Psychotherapy Provided: Individual Psychotherapy     1. Generalized anxiety disorder        2. Major depressive disorder, recurrent, mild ([address])            Goals addressed in session: Goal 1     DATA: Client presented for a telehealth session via [address] [place] [address]. Client reported that she has been extremely busy. 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 Client addressed that with everything going on, she has not had the opportunity to look over the communication handouts with her . She shared that lately, things between them have been going well. She voiced that he has had a persistent cough for a while now, and wants him to see the doctors. Client expressed that he has been holding off from making an appointment. Client disclosed that she received the results from her heart monitor and she was diagnosed with atrial tachycardia (fast heart rate). She was prescribed a medication that is supposed to help with this.  She was finally cleared for carpel tunnel surgery and plans to make that appointment today. 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 Clinician actively listened, provided emotional support, validated client's feelings, addressed and processed current events, was encouraged to hear that she is taking care of herself and prioritizing her needs, emphasized the importance of giving herself jimy for working as a  for the first time in a while and explored coping skills. During this session, this clinician used the following therapeutic modalities: Client-centered Therapy, Cognitive Behavioral Therapy, and Supportive Psychotherapy    Substance Abuse was not addressed during this session. If the client is diagnosed with a co-occurring substance use disorder, please indicate any changes in the frequency or amount of use: N/A. Stage of change for addressing substance use diagnoses: No substance use/Not applicable    ASSESSMENT:  Gloria Arias presents with a Euthymic/ normal mood. her affect is Normal range and intensity, which is congruent, with her mood and the content of the session. The client has made progress on their goals. Gloria Arias presents with a low risk of suicide, low risk of self-harm, and low risk of harm to others. For any risk assessment that surpasses a "low" rating, a safety plan must be developed. A safety plan was indicated: no  If yes, describe in detail N/A    PLAN: Between sessions, Gloria Arias will continue to utilize coping skills to manage stress/anxiety. At the next session, the therapist will use Client-centered Therapy, Cognitive Behavioral Therapy, and Supportive Psychotherapy to address anxiety. Behavioral Health Treatment Plan and Discharge Planning: Gloria Arias is aware of and agrees to continue to work on their treatment plan. They have identified and are working toward their discharge goals.  yes    Visit start and stop times:    11/08/23  Start Time: 0803  Stop Time: 0900  Total Visit Time: 57 minutes

## 2023-11-13 ENCOUNTER — TELEPHONE (OUTPATIENT)
Dept: PSYCHIATRY | Facility: CLINIC | Age: 53
End: 2023-11-13

## 2023-11-13 NOTE — TELEPHONE ENCOUNTER
Left voicemail informing patient of the Virtual Psych Encounter form needing to be signed as a requirement from [employer] for billing purposes. Patient can access form via "[employer] and sign electronically. Please make patient aware this form must be signed for each virtual visit as a requirement to continue virtual visits with provider.

## 2023-11-22 ENCOUNTER — TELEMEDICINE (OUTPATIENT)
Dept: BEHAVIORAL/MENTAL HEALTH CLINIC | Facility: CLINIC | Age: 53
End: 2023-11-22
Payer: COMMERCIAL

## 2023-11-22 DIAGNOSIS — F33.0 MAJOR DEPRESSIVE DISORDER, RECURRENT, MILD (HCC): ICD-10-CM

## 2023-11-22 DIAGNOSIS — F41.1 GENERALIZED ANXIETY DISORDER: Primary | ICD-10-CM

## 2023-11-22 PROCEDURE — 90837 PSYTX W PT 60 MINUTES: CPT | Performed by: SOCIAL WORKER

## 2023-11-22 NOTE — PSYCH
Virtual Regular Visit    Verification of patient location:    Patient is located at Home in the following state in which I hold an active license Other; [place]      Assessment/Plan:    Problem List Items Addressed This Visit    None  Visit Diagnoses       Generalized anxiety disorder    -  Primary    Major depressive disorder, recurrent, mild ([address])                Goals addressed in session: Goal 1          Reason for visit is   Chief Complaint   Patient presents with    Virtual Regular Visit          Encounter provider Isaiah Michelle    Provider located at [address] [place] [address]  [phone]      Recent Visits  No visits were found meeting these conditions. Showing recent visits within past 7 days and meeting all other requirements  Today's Visits  Date Type Provider Dept   11/22/23 [facility] Therapist [facility]   Showing today's visits and meeting all other requirements  Future Appointments  No visits were found meeting these conditions. Showing future appointments within next 150 days and meeting all other requirements       The patient was identified by name and date of birth. Myra Yeh was informed that this is a telemedicine visit and [facility] She agrees to proceed. .  My office door was closed. No one else was in the room. She acknowledged consent and understanding of privacy and security of the video platform. The patient has agreed to participate and understands they can discontinue the visit at any time. Patient is aware this is a billable service. Subjective  yMra Yeh is a 50 y.o. female .       HPI     Past Medical History:   Diagnosis Date    Carpal tunnel syndrome     Depression     Diabetes 1.5, managed as type 2 ([address])        Past Surgical History: Procedure Laterality Date     SECTION      , ,     HERNIA REPAIR Left     age 5    HERNIA REPAIR Right     age 3    TUBAL LIGATION             Current Outpatient Medications   Medication Sig Dispense Refill    Blood Glucose Monitoring Suppl (Contour Next One) JOHN       Contour Next Test test strip daily Test as directed      metFORMIN (GLUCOPHAGE-XR) 500 mg 24 hr tablet       Microlet Lancets MISC       sertraline (ZOLOFT) 100 mg tablet Take 1 tablet (100 mg total) by mouth daily 30 tablet 2     No current facility-administered medications for this visit. No Known Allergies    Review of Systems    Video Exam    There were no vitals filed for this visit. Physical Exam     Behavioral Health Psychotherapy Progress Note    Psychotherapy Provided: Individual Psychotherapy     1. Generalized anxiety disorder        2. Major depressive disorder, recurrent, mild ([address])            Goals addressed in session: Goal 1     DATA: Client presented for a telehealth session via [facility]. Client reported that the last couple weeks have been going well. 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 She expressed the ALS  also went well and people have been buying merchandise to support the cause. Client voiced that she and her  may have been involved in a scam through social security. She expressed that they both received notice from the social security office on the same day, which was alarming. She described what the notices said and encouraged her  to find the "real" contact information for the social security office instead of calling the number provided in the notice. Client spoke in-length around her , his past relationship, how it has effected their relationship and how she has been feeling as though she is not doing enough. Client addressed that they have looked over the communication styles handout and how it has helped her albert their communication styles towards one another. Clinician actively listened, provided emotional support, validated client's feelings, addressed and processed her concerns/frustrations and explored coping skills. During this session, this clinician used the following therapeutic modalities: Client-centered Therapy, Cognitive Behavioral Therapy, and Supportive Psychotherapy    Substance Abuse was not addressed during this session. If the client is diagnosed with a co-occurring substance use disorder, please indicate any changes in the frequency or amount of use: N/A. Stage of change for addressing substance use diagnoses: No substance use/Not applicable    ASSESSMENT:  Susana Munroe presents with a Euthymic/ normal mood. her affect is Normal range and intensity, which is congruent, with her mood and the content of the session. The client has made progress on their goals. Susana Munroe presents with a low risk of suicide, low risk of self-harm, and low risk of harm to others. For any risk assessment that surpasses a "low" rating, a safety plan must be developed. A safety plan was indicated: no  If yes, describe in detail N/A    PLAN: Between sessions, Susana Munroe will continue to utilize coping skills to manage stress/anxiety. At the next session, the therapist will use Client-centered Therapy, Cognitive Behavioral Therapy, and Supportive Psychotherapy to address anxiety. Behavioral Health Treatment Plan and Discharge Planning: Susana Munroe is aware of and agrees to continue to work on their treatment plan. They have identified and are working toward their discharge goals.  yes    Visit start and stop times:    11/22/23  Start Time: 0803  Stop Time: 8387  Total Visit Time: 53 minutes

## 2023-12-06 ENCOUNTER — TELEMEDICINE (OUTPATIENT)
Dept: BEHAVIORAL/MENTAL HEALTH CLINIC | Facility: CLINIC | Age: 53
End: 2023-12-06
Payer: COMMERCIAL

## 2023-12-06 DIAGNOSIS — F33.0 MAJOR DEPRESSIVE DISORDER, RECURRENT, MILD (HCC): ICD-10-CM

## 2023-12-06 DIAGNOSIS — F41.1 GENERALIZED ANXIETY DISORDER: Primary | ICD-10-CM

## 2023-12-06 PROCEDURE — 90837 PSYTX W PT 60 MINUTES: CPT | Performed by: SOCIAL WORKER

## 2023-12-06 NOTE — PSYCH
Virtual Regular Visit    Verification of patient location:    Patient is located at Home in the following state in which I hold an active license Other; [place]      Assessment/Plan:    Problem List Items Addressed This Visit    None      Goals addressed in session: Goal 1          Reason for visit is   Chief Complaint   Patient presents with    Virtual Regular Visit          Encounter provider Bria Ramos    Provider located at [address] [place] [address]  [phone]      Recent Visits  No visits were found meeting these conditions. Showing recent visits within past 7 days and meeting all other requirements  Today's Visits  Date Type Provider Dept   23 Telemedicine [facility] Therapist [facility]   Showing today's visits and meeting all other requirements  Future Appointments  No visits were found meeting these conditions. Showing future appointments within next 150 days and meeting all other requirements       The patient was identified by name and date of birth. Dorie Maria was informed that this is a telemedicine visit and [facility] She agrees to proceed. .  My office door was closed. No one else was in the room. She acknowledged consent and understanding of privacy and security of the video platform. The patient has agreed to participate and understands they can discontinue the visit at any time. Patient is aware this is a billable service. Subjective  Dorie Maria is a 50 y.o. female .       HPI     Past Medical History:   Diagnosis Date    Carpal tunnel syndrome     Depression     Diabetes 1.5, managed as type 2 [facility])        Past Surgical History:   Procedure Laterality Date     SECTION      , ,     HERNIA REPAIR Left     age 5    HERNIA REPAIR Right age 3    TUBAL LIGATION      2011       Current Outpatient Medications   Medication Sig Dispense Refill    Blood Glucose Monitoring Suppl (Contour Next One) JOHN       Contour Next Test test strip daily Test as directed      metFORMIN (GLUCOPHAGE-XR) 500 mg 24 hr tablet       Microlet Lancets MISC       sertraline (ZOLOFT) 100 mg tablet Take 1 tablet (100 mg total) by mouth daily 30 tablet 2     No current facility-administered medications for this visit. No Known Allergies    Review of Systems    Video Exam    There were no vitals filed for this visit. Physical Exam     Behavioral Health Psychotherapy Progress Note    Psychotherapy Provided: Individual Psychotherapy     1. Generalized anxiety disorder        2. Major depressive disorder, recurrent, mild ([address])            Goals addressed in session: Goal 1     DATA: Client presented for a telehealth session via [facility]. Client reported that Kalie went well overall. She highlighted that her family had to rent the local [employer] to host 75 people. She expressed that it was nice to see and catch up with family that she has not seen in a while. She voiced that it was difficult to not have certain family members there due to passing away. She noted her uncle had recently passed, and his absence was felt. Client reflected on the upcoming holidays and how she voiced that a lot of changes have taken place. Client mentioned that the holidays are not want they used to be and how she misses the true meaning of [place]. Client talked in-length around a recent argument she had with her  and how they have been feeling that they are each not doing enough around the home. Client addressed what she tries to do to help out and how her  feels this is not enough and vice versa. Client also mentioned that she does not like the way she treats her kids at times, stating that he talks down on them.  Client voiced wanting to create a chore chart that can help alleviate some of the stress on each other. Clinician actively listened, provided emotional support, validated client's feelings, addressed and processed current events, discussed what she could do to help honor those that have passed to get through the holidays, talked about creating age appropriate chores for each person and allowing the younger ones to decide what chore they would like to do and rotate chores, emphasized the importance of communicating with each other and with kids. During this session, this clinician used the following therapeutic modalities: Client-centered Therapy, Cognitive Behavioral Therapy, and Supportive Psychotherapy    Substance Abuse was not addressed during this session. If the client is diagnosed with a co-occurring substance use disorder, please indicate any changes in the frequency or amount of use: N/A. Stage of change for addressing substance use diagnoses: No substance use/Not applicable    ASSESSMENT:  Kendra Weinberg presents with a Euthymic/ normal mood. her affect is Normal range and intensity, which is congruent, with her mood and the content of the session. The client has made progress on their goals. Kendra Weinberg presents with a low risk of suicide, low risk of self-harm, and low risk of harm to others. For any risk assessment that surpasses a "low" rating, a safety plan must be developed. A safety plan was indicated: no  If yes, describe in detail N/A    PLAN: Between sessions, Kendra Weinberg will continue to utilize coping skills to manage stress/anxiety and work on creating a chore chart that is age appropriate. At the next session, the therapist will use Client-centered Therapy, Cognitive Behavioral Therapy, and Supportive Psychotherapy to address anxiety. Behavioral Health Treatment Plan and Discharge Planning: Kendra Weinberg is aware of and agrees to continue to work on their treatment plan.  They have identified and are working toward their discharge goals.  yes    Visit start and stop times:    12/06/23  Start Time: 0803  Stop Time: 0900  Total Visit Time: 57 minutes

## 2023-12-20 ENCOUNTER — TELEMEDICINE (OUTPATIENT)
Dept: BEHAVIORAL/MENTAL HEALTH CLINIC | Facility: CLINIC | Age: 53
End: 2023-12-20
Payer: COMMERCIAL

## 2023-12-20 DIAGNOSIS — F33.0 MAJOR DEPRESSIVE DISORDER, RECURRENT, MILD (HCC): ICD-10-CM

## 2023-12-20 DIAGNOSIS — F41.1 GENERALIZED ANXIETY DISORDER: Primary | ICD-10-CM

## 2023-12-20 PROCEDURE — 90837 PSYTX W PT 60 MINUTES: CPT | Performed by: SOCIAL WORKER

## 2023-12-20 NOTE — PSYCH
Virtual Regular Visit    Verification of patient location:    Patient is located at Home in the following state in which I hold an active license Other; [place]      Assessment/Plan:    Problem List Items Addressed This Visit    None  Visit Diagnoses       Generalized anxiety disorder    -  Primary    Major depressive disorder, recurrent, mild (HCC)                Goals addressed in session: Goal 1          Reason for visit is   Chief Complaint   Patient presents with    Virtual Regular Visit          Encounter provider Maria Alejandra Arteaga    Provider located at [facility] THERAPIST [facility]  [facility] THERAPIST MENTAL HEALTH OUTPATIENT  [address] [facility] [address] [phone]      Recent Visits  No visits were found meeting these conditions.  Showing recent visits within past 7 days and meeting all other requirements  Today's Visits  Date Type Provider Dept   12/20/23 Telemedicine Maria Alejandra Arteaga [facility] Therapist [facility]   Showing today's visits and meeting all other requirements  Future Appointments  No visits were found meeting these conditions.  Showing future appointments within next 150 days and meeting all other requirements       The patient was identified by name and date of birth. Judith Velazquez was informed that this is a telemedicine visit and that the visit is being conducted throughthe Epic Embedded platform. She agrees to proceed..  My office door was closed. No one else was in the room.  She acknowledged consent and understanding of privacy and security of the video platform. The patient has agreed to participate and understands they can discontinue the visit at any time.    Patient is aware this is a billable service.     Subjective  Judith Velazquez is a 53 y.o. female .      HPI     Past Medical History:   Diagnosis Date    Carpal tunnel syndrome     Depression     Diabetes 1.5, managed as type 2 (HCC)        Past Surgical  History:   Procedure Laterality Date     SECTION      , ,     HERNIA REPAIR Left     age 6    HERNIA REPAIR Right     age 5    TUBAL LIGATION             Current Outpatient Medications   Medication Sig Dispense Refill    Blood Glucose Monitoring Suppl (Contour Next One) JOHN       Contour Next Test test strip daily Test as directed      metFORMIN (GLUCOPHAGE-XR) 500 mg 24 hr tablet       Microlet Lancets MISC       sertraline (ZOLOFT) 100 mg tablet Take 1 tablet (100 mg total) by mouth daily 30 tablet 2     No current facility-administered medications for this visit.        No Known Allergies    Review of Systems    Video Exam    There were no vitals filed for this visit.    Physical Exam     Behavioral Health Psychotherapy Progress Note    Psychotherapy Provided: Individual Psychotherapy     1. Generalized anxiety disorder        2. Major depressive disorder, recurrent, mild (HCC)            Goals addressed in session: Goal 1     DATA: Client presented for a telehealth session via [employer]. Client reported that she had celebrated her birthday earlier this week. She shared that her children bought her flowers and some gifts. She mentioned that her  had made dinner that night, but did not acknowledge it was her birthday until later in the evening. She mentioned how this disappointed her, stating that it took until the evening for him to say something. Client expressed that she and her  completed a home project for someone and received final payment. She mentioned that money has been tight, so any extra projects they are able to get has been helpful. She noted that her  receives disability and recently got a letter in the mail, stating that that they had over paid him. Client has tried to contact the social security office but has not been able to get a hold of someone. She spoke around the upcoming holiday and how she is thankful for her children. She addressed that  "her children were not raised to be materialistic and how they appreciate the value of a dollar. Clinician actively listened, provided emotional support, validated client's feelings, addressed and processed current events, encouraged client to try and see if her  could go to the social security office to get his disability figured out and discussed what Cristina means to her and her family and explored coping skills.   During this session, this clinician used the following therapeutic modalities: Client-centered Therapy, Cognitive Behavioral Therapy, and Supportive Psychotherapy    Substance Abuse was not addressed during this session. If the client is diagnosed with a co-occurring substance use disorder, please indicate any changes in the frequency or amount of use: N/A. Stage of change for addressing substance use diagnoses: No substance use/Not applicable    ASSESSMENT:  Alayna Velazquez presents with a Euthymic/ normal mood.     her affect is Normal range and intensity, which is congruent, with her mood and the content of the session. The client has made progress on their goals.     Alayna Velazquez presents with a low risk of suicide, low risk of self-harm, and low risk of harm to others.    For any risk assessment that surpasses a \"low\" rating, a safety plan must be developed.    A safety plan was indicated: no  If yes, describe in detail N/A    PLAN: Between sessions, Alayna Velazquez will continue to utilize coping skills to manage stress/anxiety. At the next session, the therapist will use Client-centered Therapy, Cognitive Behavioral Therapy, and Supportive Psychotherapy to address anxiety.    Behavioral Health Treatment Plan and Discharge Planning: Alayna Velazquez is aware of and agrees to continue to work on their treatment plan. They have identified and are working toward their discharge goals. yes    Visit start and stop times:    12/20/23  Start Time: 0804  Stop Time: 0900  Total Visit Time: 56 " minutes

## 2023-12-22 LAB — HBA1C MFR BLD HPLC: 7.2 %

## 2023-12-26 ENCOUNTER — TELEPHONE (OUTPATIENT)
Dept: PSYCHIATRY | Facility: CLINIC | Age: 53
End: 2023-12-26

## 2023-12-26 NOTE — TELEPHONE ENCOUNTER
Left voicemail informing patient of the Psych Encounter form needing to be signed as a requirement from the insurance company for billing purposes. Patient can access form via [employer] and sign electronically. Please make patient aware this form must be signed for each visit as a requirement to continue future visits with provider.

## 2024-01-03 ENCOUNTER — TELEMEDICINE (OUTPATIENT)
Dept: PSYCHIATRY | Facility: CLINIC | Age: 54
End: 2024-01-03
Payer: COMMERCIAL

## 2024-01-03 ENCOUNTER — TELEMEDICINE (OUTPATIENT)
Dept: BEHAVIORAL/MENTAL HEALTH CLINIC | Facility: CLINIC | Age: 54
End: 2024-01-03
Payer: COMMERCIAL

## 2024-01-03 DIAGNOSIS — F41.1 GENERALIZED ANXIETY DISORDER: ICD-10-CM

## 2024-01-03 DIAGNOSIS — F32.A DEPRESSION, UNSPECIFIED DEPRESSION TYPE: Primary | ICD-10-CM

## 2024-01-03 DIAGNOSIS — F41.1 GENERALIZED ANXIETY DISORDER: Primary | ICD-10-CM

## 2024-01-03 PROCEDURE — 99214 OFFICE O/P EST MOD 30 MIN: CPT | Performed by: PSYCHIATRY & NEUROLOGY

## 2024-01-03 PROCEDURE — 90837 PSYTX W PT 60 MINUTES: CPT | Performed by: SOCIAL WORKER

## 2024-01-03 RX ORDER — SERTRALINE HYDROCHLORIDE 25 MG/1
25 TABLET, FILM COATED ORAL DAILY
Qty: 30 TABLET | Refills: 2 | Status: SHIPPED | OUTPATIENT
Start: 2024-01-03

## 2024-01-03 NOTE — PSYCH
Virtual Regular Visit    Verification of patient location:    Patient is located at Home in the following state in which I hold an active license Other; [place]      Assessment/Plan:    Problem List Items Addressed This Visit    None  Visit Diagnoses       Generalized anxiety disorder    -  Primary            Goals addressed in session: Goal 1          Reason for visit is   Chief Complaint   Patient presents with    Virtual Regular Visit          Encounter provider Maria Alejandra Arteaga    Provider located at [facility] THERAPIST [facility]  [facility] [address] [facility] THERAPIST MENTAL HEALTH OUTPATIENT  [address] [facility] [id number] [phone]      Recent Visits  No visits were found meeting these conditions.  Showing recent visits within past 7 days and meeting all other requirements  Today's Visits  Date Type Provider Dept   24 Telemedicine Maria Alejandra Arteaga [facility] Therapist [facility]   Showing today's visits and meeting all other requirements  Future Appointments  No visits were found meeting these conditions.  Showing future appointments within next 150 days and meeting all other requirements       The patient was identified by name and date of birth. Judith Velazquez was informed that this is a telemedicine visit and that the visit is being conducted throughthe Epic Embedded platform. She agrees to proceed..  My office door was closed. No one else was in the room.  She acknowledged consent and understanding of privacy and security of the video platform. The patient has agreed to participate and understands they can discontinue the visit at any time.    Patient is aware this is a billable service.     Subjective  Judith Velazquez is a 53 y.o. female .      HPI     Past Medical History:   Diagnosis Date    Carpal tunnel syndrome     Depression     Diabetes 1.5, managed as type 2 (HCC)        Past Surgical History:   Procedure Laterality Date     SECTION       2001, 2004, 2011    HERNIA REPAIR Left     age 6    HERNIA REPAIR Right     age 5    TUBAL LIGATION      2011       Current Outpatient Medications   Medication Sig Dispense Refill    Blood Glucose Monitoring Suppl (Contour Next One) JOHN       Contour Next Test test strip daily Test as directed      metFORMIN (GLUCOPHAGE-XR) 500 mg 24 hr tablet       Microlet Lancets MISC       sertraline (Zoloft) 25 mg tablet Take 1 tablet (25 mg total) by mouth daily With 50 mg 30 tablet 2    sertraline (Zoloft) 50 mg tablet Take 1 tablet (50 mg total) by mouth daily 30 tablet 2     No current facility-administered medications for this visit.        No Known Allergies    Review of Systems    Video Exam    There were no vitals filed for this visit.    Physical Exam     Behavioral Health Psychotherapy Progress Note    Psychotherapy Provided: Individual Psychotherapy     1. Generalized anxiety disorder            Goals addressed in session: Goal 1     DATA: Client presented for a telehealth session via Private Outlet. Client reported that she had a really nice Cristina and Thanksgiving. She mentioned having to celebrate the holidays later with family, stating that both her sister and mom tested positive for COVID. Client addressed that she has been taking health precautions after finding out, to make sure she does not effect those within her home. She mentioned that she will be seeing the extended family this weekend and celebrating Cristina then. In the meantime, client talked around her  feeling overwhelmed and stressed. She expressed concern for him and how he takes things personally (due to his past relationships and how he was treated with his ex-wife). Client noted that she struggles with constantly reassuring him and encouraging him when he thinks negatively about himself. Client voiced needing to work on their communication, emphasizing how this has been an ongoing issue between them. Clinician actively listened,  "provided emotional support, validated client's feelings, addressed and processed current events, explored communication styles (tone, body language), marriage counseling and reflected on coping skills.   During this session, this clinician used the following therapeutic modalities: Client-centered Therapy, Cognitive Behavioral Therapy, and Supportive Psychotherapy    Substance Abuse was not addressed during this session. If the client is diagnosed with a co-occurring substance use disorder, please indicate any changes in the frequency or amount of use: N/A. Stage of change for addressing substance use diagnoses: No substance use/Not applicable    ASSESSMENT:  Alayna Velazquez presents with a Euthymic/ normal mood.     her affect is Normal range and intensity, which is congruent, with her mood and the content of the session. The client has made progress on their goals.     Alayna Velazquez presents with a low risk of suicide, low risk of self-harm, and low risk of harm to others.    For any risk assessment that surpasses a \"low\" rating, a safety plan must be developed.    A safety plan was indicated: no  If yes, describe in detail N/A    PLAN: Between sessions, Alayna Velazquez will continue to utilize coping skills to manage stress/anxiety. Will work on communication and explore marriage counseling. At the next session, the therapist will use Client-centered Therapy, Cognitive Behavioral Therapy, and Supportive Psychotherapy to address anxiety.    Behavioral Health Treatment Plan and Discharge Planning: Alayna Velazquez is aware of and agrees to continue to work on their treatment plan. They have identified and are working toward their discharge goals. yes    Visit start and stop times:    01/03/24  Start Time: 0900  Stop Time: 1000  Total Visit Time: 60 minutes        "

## 2024-01-03 NOTE — PSYCH
Virtual Regular Visit    Verification of patient location:    Patient is located at Home in the following state in which I hold an active license [place]      Assessment/Plan:    Problem List Items Addressed This Visit    None      Goals addressed in session: Goal 1          Reason for visit is   Chief Complaint   Patient presents with    Medication Management        Encounter provider Florecita Merlos MD    Provider located at [facility] MENTAL HEALTH OUTPATIENT  [address] [facility] [id number] [phone]      Recent Visits  No visits were found meeting these conditions.  Showing recent visits within past 7 days and meeting all other requirements  Today's Visits  Date Type Provider Dept   01/03/24 Telemedicine Florecita Merlos MD [facility]   Showing today's visits and meeting all other requirements  Future Appointments  No visits were found meeting these conditions.  Showing future appointments within next 150 days and meeting all other requirements       The patient was identified by name and date of birth. Judith Velazquez was informed that this is a telemedicine visit and that the visit is being conducted throughthe Epic Embedded platform. She agrees to proceed..  My office door was closed. No one else was in the room.  She acknowledged consent and understanding of privacy and security of the video platform. The patient has agreed to participate and understands they can discontinue the visit at any time.    Patient is aware this is a billable service.     Subjective  Judith Velazquez is a 53 y.o. female with depression and anxiety presents for regular f/u  .  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;  on disability      HPI   Mood - reports as good and stable; social and active at baseline; does ADLs,  "denies depression or mood swings  Anxiety - worries - \" manageable\"  Past Medical History:   Diagnosis Date    Carpal tunnel syndrome     Depression     Diabetes 1.5, managed as type 2 (HCC)        Past Surgical History:   Procedure Laterality Date     SECTION      , ,     HERNIA REPAIR Left     age 6    HERNIA REPAIR Right     age 5    TUBAL LIGATION             Current Outpatient Medications   Medication Sig Dispense Refill    sertraline (ZOLOFT) 100 mg tablet Take 1 tablet (100 mg total) by mouth daily 30 tablet 2    Blood Glucose Monitoring Suppl (Contour Next One) JOHN       Contour Next Test test strip daily Test as directed      metFORMIN (GLUCOPHAGE-XR) 500 mg 24 hr tablet       Microlet Lancets MISC        No current facility-administered medications for this visit.        No Known Allergies    Review of Systems   Constitutional:  Negative for activity change and appetite change.   Psychiatric/Behavioral:  Negative for dysphoric mood, sleep disturbance and suicidal ideas. The patient is not nervous/anxious.        Video Exam    There were no vitals filed for this visit.    Physical Exam  Constitutional:       Appearance: Normal appearance. She is normal weight.   Neurological:      Mental Status: She is alert.   Psychiatric:         Attention and Perception: Attention and perception normal.         Mood and Affect: Mood and affect normal.         Speech: Speech normal.         Behavior: Behavior normal. Behavior is cooperative.         Thought Content: Thought content normal.         Judgment: Judgment normal.      Comments: Circumstantial  - baseline          Visit Time    Visit Start Time: 7.56 am   Visit Stop Time: 8.14 am   Total Visit Duration:  30 minutes        "

## 2024-01-08 ENCOUNTER — TELEPHONE (OUTPATIENT)
Dept: BEHAVIORAL/MENTAL HEALTH CLINIC | Facility: CLINIC | Age: 54
End: 2024-01-08

## 2024-01-08 NOTE — TELEPHONE ENCOUNTER
Left voicemail informing patient of the Psych Encounter forms needing to be signed as a requirement from the insurance company for billing purposes for both apps on 1/3/24. Patient can access form via imeem and sign electronically.     Please make patient aware this form must be signed for each visit as a requirement to continue future visits with provider.

## 2024-01-15 ENCOUNTER — TELEPHONE (OUTPATIENT)
Dept: PSYCHIATRY | Facility: CLINIC | Age: 54
End: 2024-01-15

## 2024-01-17 ENCOUNTER — TELEMEDICINE (OUTPATIENT)
Dept: BEHAVIORAL/MENTAL HEALTH CLINIC | Facility: CLINIC | Age: 54
End: 2024-01-17
Payer: COMMERCIAL

## 2024-01-17 DIAGNOSIS — F41.1 GENERALIZED ANXIETY DISORDER: Primary | ICD-10-CM

## 2024-01-17 DIAGNOSIS — F33.0 MAJOR DEPRESSIVE DISORDER, RECURRENT, MILD (HCC): ICD-10-CM

## 2024-01-17 PROCEDURE — 90837 PSYTX W PT 60 MINUTES: CPT | Performed by: SOCIAL WORKER

## 2024-01-17 NOTE — PSYCH
Virtual Regular Visit    Verification of patient location:    Patient is located at Home in the following state in which I hold an active license Other; [place]      Assessment/Plan:    Problem List Items Addressed This Visit    None  Visit Diagnoses       Generalized anxiety disorder    -  Primary    Major depressive disorder, recurrent, mild (HCC)                Goals addressed in session: Goal 1          Reason for visit is   Chief Complaint   Patient presents with    Virtual Regular Visit          Encounter provider Maria Alejandra Arteaga    Provider located at [facility] THERAPIST [facility]  [facility] THERAPIST MENTAL HEALTH OUTPATIENT  [address] [facility] [id number] [phone]      Recent Visits  No visits were found meeting these conditions.  Showing recent visits within past 7 days and meeting all other requirements  Today's Visits  Date Type Provider Dept   01/17/24 Telemedicine Maria Alejandra Arteaga [facility] Therapist [facility]   Showing today's visits and meeting all other requirements  Future Appointments  No visits were found meeting these conditions.  Showing future appointments within next 150 days and meeting all other requirements       The patient was identified by name and date of birth. Judith Velazquez was informed that this is a telemedicine visit and that the visit is being conducted throughthe Epic Embedded platform. She agrees to proceed..  My office door was closed. No one else was in the room.  She acknowledged consent and understanding of privacy and security of the video platform. The patient has agreed to participate and understands they can discontinue the visit at any time.    Patient is aware this is a billable service.     Subjective  Judith Velazquze is a 53 y.o. female .      HPI     Past Medical History:   Diagnosis Date    Carpal tunnel syndrome     Depression     Diabetes 1.5, managed as type 2 (HCC)        Past Surgical History:    Procedure Laterality Date     SECTION      , ,     HERNIA REPAIR Left     age 6    HERNIA REPAIR Right     age 5    TUBAL LIGATION             Current Outpatient Medications   Medication Sig Dispense Refill    Blood Glucose Monitoring Suppl (Contour Next One) JOHN       Contour Next Test test strip daily Test as directed      metFORMIN (GLUCOPHAGE-XR) 500 mg 24 hr tablet       Microlet Lancets MISC       sertraline (Zoloft) 25 mg tablet Take 1 tablet (25 mg total) by mouth daily With 50 mg 30 tablet 2    sertraline (Zoloft) 50 mg tablet Take 1 tablet (50 mg total) by mouth daily 30 tablet 2     No current facility-administered medications for this visit.        No Known Allergies    Review of Systems    Video Exam    There were no vitals filed for this visit.    Physical Exam     Behavioral Health Psychotherapy Progress Note    Psychotherapy Provided: Individual Psychotherapy     1. Generalized anxiety disorder        2. Major depressive disorder, recurrent, mild (HCC)            Goals addressed in session: Goal 1     DATA: Client presented for a telehealth session via Optisense. Client reported that her kids had a two hour delay from school and how happy they were to get extra time this morning. Client expressed worry towards the family dog, stating that he has not been eating, vomiting and having bloody discharge. Client expressed concern thinking he has an obstructive bowel, however, when her  did research based on his symptoms; he did not. They plan to take him to the vet later to see what is going on. In the meantime, client spoke around her job and how they are no longer donating the money they made for the [employer]. She mentioned that they are low on funds and needing to use the money elsewhere. Client voiced frustration and disappointment with this. Client noted that she also feels she is not contributing financially, which has caused her and her  to have  "arguments. She addressed that he began [employer] to make some extra money. She talked about her upcoming carpel tunnel surgery and how she is hoping this will give her more mobility in her wrists to do more. Client spoke briefly around her mom and how she and her siblings are going to be looking into an assistant living facility tomorrow. She shared how devastating it is for the family to have to consider this for their mom. Clinician actively listened, provided emotional support, validated client's feelings, addressed and processed current events, emphasized her contributions towards the family and explored coping skills.   During this session, this clinician used the following therapeutic modalities: Client-centered Therapy, Cognitive Behavioral Therapy, and Supportive Psychotherapy    Substance Abuse was not addressed during this session. If the client is diagnosed with a co-occurring substance use disorder, please indicate any changes in the frequency or amount of use: N/A. Stage of change for addressing substance use diagnoses: No substance use/Not applicable    ASSESSMENT:  Alayna Velazquez presents with a Euthymic/ normal mood.     her affect is Normal range and intensity, which is congruent, with her mood and the content of the session. The client has made progress on their goals.     Alayna Velazquez presents with a low risk of suicide, low risk of self-harm, and low risk of harm to others.    For any risk assessment that surpasses a \"low\" rating, a safety plan must be developed.    A safety plan was indicated: no  If yes, describe in detail N/A    PLAN: Between sessions, Alayna Velazquez will continue to utilize coping skills to manage stress/anxiety. At the next session, the therapist will use Client-centered Therapy, Cognitive Behavioral Therapy, and Supportive Psychotherapy to address anxiety.    Behavioral Health Treatment Plan and Discharge Planning: Alayna Velazquez is aware of and agrees to continue to work " on their treatment plan. They have identified and are working toward their discharge goals. yes    Visit start and stop times:    01/17/24  Start Time: 0804  Stop Time: 0900  Total Visit Time: 56 minutes

## 2024-01-18 ENCOUNTER — TELEPHONE (OUTPATIENT)
Dept: PSYCHIATRY | Facility: CLINIC | Age: 54
End: 2024-01-18

## 2024-02-01 ENCOUNTER — TELEMEDICINE (OUTPATIENT)
Dept: BEHAVIORAL/MENTAL HEALTH CLINIC | Facility: CLINIC | Age: 54
End: 2024-02-01
Payer: COMMERCIAL

## 2024-02-01 DIAGNOSIS — F41.1 GENERALIZED ANXIETY DISORDER: Primary | ICD-10-CM

## 2024-02-01 PROCEDURE — 90834 PSYTX W PT 45 MINUTES: CPT | Performed by: SOCIAL WORKER

## 2024-02-01 NOTE — BH CRISIS PLAN
Client Name: Alayna Velazquez       Client YOB: 1970    SurendraNicolas Safety Plan      Creation Date: 2/1/24 Update Date: 2/1/24   Created By: Maria Alejandra Arteaga Last Updated By: Maria Alejandra Arteaga      Step 1: Warning Signs:   Warning Signs   Withdrawn- Isolate   More irritable   Shut down (get quiet)   Speak less   Do less- not as active            Step 2: Internal Coping Strategies:   Internal Coping Strategies   Watching television/Youtube   Playing games   Listening to music   Drink coffee   Organize things/clean   Pet dog, hangout with bird            Step 3: People and social settings that provide distraction:   Name Contact Information   Rula (Sister) Contact in phone   Mom Contact in phone   Kids Contact in phone   Wong ()-depends on situation Contact in phone    Places   Go to local park           Step 4: People whom I can ask for help during a crisis:      Name Contact Information    Wong Call/Text      Step 5: Professionals or agencies I can contact during a crisis:      Clinican/Agency Name Phone Emergency Contact    [facility] ([facility]) Contact in phone       Local Emergency Department Emergency Department Phone Emergency Department Address    [facility]          Crisis Phone Numbers:   Suicide Prevention Lifeline: Call or Text  988 Crisis Text Line: Text HOME to 203-727   Please note: Some [facility] do not have a separate number for Child/Adolescent specific crisis. If your county is not listed under Child/Adolescent, please call the adult number for your county      Adult Crisis Numbers: Child/Adolescent Crisis Numbers   [facility]: [phone] [facility]: [phone]   [facility]: [phone] [facility]: [phone]   [facility]: [phone] [place]: [phone]   [facility]: [phone] [place]: [phone]   [place] [facility]: [phone]   [facility]: [phone]   [facility]: [phone]   [facility]  Services: [phone] (daytime) [phone] (after hours, weekends, holidays)      Step 6: Making the environment safer (plan for lethal means safety):   Patient did not identify any lethal methods: Yes     Optional: What is most important to me and worth living for?   My kids,  and family     [facility] Safety Plan. Elizabeth Agosto and Imer Valenzuela. Used with permission of the authors.

## 2024-02-01 NOTE — PSYCH
Virtual Regular Visit    Verification of patient location:    Patient is located at Home in the following state in which I hold an active license Other; [place]      Assessment/Plan:    Problem List Items Addressed This Visit    None  Visit Diagnoses       Generalized anxiety disorder    -  Primary            Goals addressed in session: Goal 1          Reason for visit is   Chief Complaint   Patient presents with    Virtual Regular Visit          Encounter provider Maria Alejandra Arteaga    Provider located at [facility] THERAPIST [facility]  [facility] THERAPIST MENTAL HEALTH OUTPATIENT  [address] [address] [phone]      Recent Visits  No visits were found meeting these conditions.  Showing recent visits within past 7 days and meeting all other requirements  Today's Visits  Date Type Provider Dept   24 Telemedicine Maria Alejandra Arteaga [facility] Therapist [facility]   Showing today's visits and meeting all other requirements  Future Appointments  No visits were found meeting these conditions.  Showing future appointments within next 150 days and meeting all other requirements       The patient was identified by name and date of birth. Judith Velazquez was informed that this is a telemedicine visit and that the visit is being conducted throughthe Epic Embedded platform. She agrees to proceed..  My office door was closed. No one else was in the room.  She acknowledged consent and understanding of privacy and security of the video platform. The patient has agreed to participate and understands they can discontinue the visit at any time.    Patient is aware this is a billable service.     Subjective  Judith Velazquez is a 53 y.o. female .      HPI     Past Medical History:   Diagnosis Date    Carpal tunnel syndrome     Depression     Diabetes 1.5, managed as type 2 (HCC)        Past Surgical History:   Procedure Laterality Date     SECTION       2001, 2004, 2011    HERNIA REPAIR Left     age 6    HERNIA REPAIR Right     age 5    TUBAL LIGATION      2011       Current Outpatient Medications   Medication Sig Dispense Refill    Blood Glucose Monitoring Suppl (Contour Next One) JOHN       Contour Next Test test strip daily Test as directed      metFORMIN (GLUCOPHAGE-XR) 500 mg 24 hr tablet       Microlet Lancets MISC       sertraline (Zoloft) 25 mg tablet Take 1 tablet (25 mg total) by mouth daily With 50 mg 30 tablet 2    sertraline (Zoloft) 50 mg tablet Take 1 tablet (50 mg total) by mouth daily 30 tablet 2     No current facility-administered medications for this visit.        No Known Allergies    Review of Systems    Video Exam    There were no vitals filed for this visit.    Physical Exam     Behavioral Health Psychotherapy Progress Note    Psychotherapy Provided: Individual Psychotherapy     1. Generalized anxiety disorder            Goals addressed in session: Goal 1     DATA: Client presented for a telehealth session via [employer]. Client reported that her  got laid off of work the same day she had carpel tunnel surgery, which was also the same day as their wedding anniversary. Client expressed that it has been stressful, but things have calmed down a bit. She mentioned that he has had a few job interviews and all seem to be very interested in hiring him. She voiced that it is a matter of which job is going to pay the most. Client noted that during this time, he had also been [employer] for some extra money. She had also been working more, while also being mindful of her recent surgery and the restrictions involved. Client spoke in-length around work and how she has been helping clean and reorganize the space. She highlighted that her boss was very impressed with what she had done. Client acknowledged how this made her feel. She shared that her sister is helping her [facility], stating that they  "toured the facility earlier and they both liked what they saw. Client disclosed that her oldest daughter is moving out on Saturday and how she is moving in with her boyfriend. Client expressed how this is a bittersweet moment for her. Client and her  will be shifting around rooms, once she is officially moved out. In the meantime, client talked around her mom and sister flying down to [place] to spend time with other relatives. Client briefly mentioned that her friend had a heart attack recently and how this concerned her for her own health. She noted that she is the same age as her friend and have had similar health complications. Clinician actively listened, provided emotional support, validated client's feelings, addressed and processed current events, was encouraged to hear that work has been pleased with what she has done, explored and reflected on coping skills.  During this session, this clinician used the following therapeutic modalities: Client-centered Therapy, Cognitive Behavioral Therapy, and Supportive Psychotherapy    Substance Abuse was not addressed during this session. If the client is diagnosed with a co-occurring substance use disorder, please indicate any changes in the frequency or amount of use: N/A. Stage of change for addressing substance use diagnoses: No substance use/Not applicable    ASSESSMENT:  Alayna Velazquez presents with a Euthymic/ normal mood.     her affect is Normal range and intensity, which is congruent, with her mood and the content of the session. The client has made progress on their goals.     Alayna Velazquez presents with a low risk of suicide, low risk of self-harm, and low risk of harm to others.    For any risk assessment that surpasses a \"low\" rating, a safety plan must be developed.    A safety plan was indicated: no  If yes, describe in detail N/A    PLAN: Between sessions, Alayna Velazquez will continue to utilize coping skills to manage stress/anxiety. At the " next session, the therapist will use Client-centered Therapy, Cognitive Behavioral Therapy, and Supportive Psychotherapy to address anxiety.    Behavioral Health Treatment Plan and Discharge Planning: Alayna Velazquez is aware of and agrees to continue to work on their treatment plan. They have identified and are working toward their discharge goals. yes    Visit start and stop times:    02/01/24  Start Time: 1602  Stop Time: 1654  Total Visit Time: 52 minutes

## 2024-02-21 ENCOUNTER — TELEMEDICINE (OUTPATIENT)
Dept: BEHAVIORAL/MENTAL HEALTH CLINIC | Facility: CLINIC | Age: 54
End: 2024-02-21
Payer: COMMERCIAL

## 2024-02-21 DIAGNOSIS — F41.1 GENERALIZED ANXIETY DISORDER: Primary | ICD-10-CM

## 2024-02-21 PROCEDURE — 90837 PSYTX W PT 60 MINUTES: CPT | Performed by: SOCIAL WORKER

## 2024-02-21 NOTE — PSYCH
Virtual Regular Visit    Verification of patient location:    Patient is located at Home in the following state in which I hold an active license [place]; [place]      Assessment/Plan:    Problem List Items Addressed This Visit    None  Visit Diagnoses       Generalized anxiety disorder    -  Primary            Goals addressed in session: Goal 1          Reason for visit is   Chief Complaint   Patient presents with    Virtual Regular Visit          Encounter provider Maria Alejandra Arteaga    Provider located at [facility] THERAPIST [facility]  [facility] THERAPIST MENTAL HEALTH OUTPATIENT  [address] [address] [phone]      Recent Visits  No visits were found meeting these conditions.  Showing recent visits within past 7 days and meeting all other requirements  Today's Visits  Date Type Provider Dept   24 Telemedicine Maria Alejandra Arteaga [facility] Therapist [facility]   Showing today's visits and meeting all other requirements  Future Appointments  No visits were found meeting these conditions.  Showing future appointments within next 150 days and meeting all other requirements       The patient was identified by name and date of birth. Judith Velazquez was informed that this is a telemedicine visit and that the visit is being conducted throughthe Epic Embedded platform. She agrees to proceed..  My office door was closed. No one else was in the room.  She acknowledged consent and understanding of privacy and security of the video platform. The patient has agreed to participate and understands they can discontinue the visit at any time.    Patient is aware this is a billable service.     Subjective  Judith Velazquez is a 53 y.o. female .      HPI     Past Medical History:   Diagnosis Date    Carpal tunnel syndrome     Depression     Diabetes 1.5, managed as type 2 (HCC)        Past Surgical History:   Procedure Laterality Date     SECTION       2001, 2004, 2011    HERNIA REPAIR Left     age 6    HERNIA REPAIR Right     age 5    TUBAL LIGATION      2011       Current Outpatient Medications   Medication Sig Dispense Refill    Blood Glucose Monitoring Suppl (Contour Next One) JOHN       Contour Next Test test strip daily Test as directed      metFORMIN (GLUCOPHAGE-XR) 500 mg 24 hr tablet       Microlet Lancets MISC       sertraline (Zoloft) 25 mg tablet Take 1 tablet (25 mg total) by mouth daily With 50 mg 30 tablet 2    sertraline (Zoloft) 50 mg tablet Take 1 tablet (50 mg total) by mouth daily 30 tablet 2     No current facility-administered medications for this visit.        No Known Allergies    Review of Systems    Video Exam    There were no vitals filed for this visit.    Physical Exam     Behavioral Health Psychotherapy Progress Note    Psychotherapy Provided: Individual Psychotherapy     1. Generalized anxiety disorder            Goals addressed in session: Goal 1     DATA: Client presented for a telehealth session via [employer]. Client reported that she has an event on Saturday, stating that it is the [place] [employer]. She was hoping to have her children come, but they are unable to attend. Client expressed that she will be representing [employer], which is a  that is in honor of her father. Client highlighted that her  has his two week work review today and how she is hoping it goes well. She addressed that the job had sought him out. She noted that he turned down a few other jobs to take this one. Client mentioned that she was sick last week, but this gave her an opportunity to talk to him around saving money and trying to put money aside for trips/vacations. Client noted that the conversation had gone well overall and was happy that they could agree with each other. Client spoke around her mom and how she will be going into an assistant living home. She will be getting evaluated on Friday to determine what level  "of care she is going to need. She and her siblings are telling their mom later today. Client is hopeful that she will take the news well. In the meantime, client is focusing on staying positive through this transition. Clinician actively listened, provided emotional support, validated client's feelings, addressed and processed current events, discussed the challenges of transition and adjusting and explored coping skills.   During this session, this clinician used the following therapeutic modalities: Client-centered Therapy, Cognitive Behavioral Therapy, and Supportive Psychotherapy    Substance Abuse was not addressed during this session. If the client is diagnosed with a co-occurring substance use disorder, please indicate any changes in the frequency or amount of use: N/A. Stage of change for addressing substance use diagnoses: No substance use/Not applicable    ASSESSMENT:  Alayna Velazquez presents with a Euthymic/ normal mood.     her affect is Normal range and intensity, which is congruent, with her mood and the content of the session. The client has made progress on their goals.     Alayna Velazquez presents with a low risk of suicide, low risk of self-harm, and low risk of harm to others.    For any risk assessment that surpasses a \"low\" rating, a safety plan must be developed.    A safety plan was indicated: no  If yes, describe in detail N/A    PLAN: Between sessions, Alayna Velazquez will continue to utilize coping skills to manage stress/anxiety. At the next session, the therapist will use Client-centered Therapy, Cognitive Behavioral Therapy, and Supportive Psychotherapy to address anxiety.    Behavioral Health Treatment Plan and Discharge Planning: Alayna Velazquez is aware of and agrees to continue to work on their treatment plan. They have identified and are working toward their discharge goals. yes    Visit start and stop times:    02/21/24  Start Time: 0803  Stop Time: 0900  Total Visit Time: 57 " minutes

## 2024-03-05 ENCOUNTER — TELEPHONE (OUTPATIENT)
Dept: PSYCHIATRY | Facility: CLINIC | Age: 54
End: 2024-03-05

## 2024-03-05 NOTE — TELEPHONE ENCOUNTER
Patient left message stating she has appt scheduled closer to the end of the month, however, has been waiting a while and wanting to decrease sertraline down to 50mg. Is this okay?

## 2024-03-06 ENCOUNTER — TELEMEDICINE (OUTPATIENT)
Dept: BEHAVIORAL/MENTAL HEALTH CLINIC | Facility: CLINIC | Age: 54
End: 2024-03-06
Payer: COMMERCIAL

## 2024-03-06 DIAGNOSIS — F41.1 GENERALIZED ANXIETY DISORDER: Primary | ICD-10-CM

## 2024-03-06 PROCEDURE — 90837 PSYTX W PT 60 MINUTES: CPT | Performed by: SOCIAL WORKER

## 2024-03-06 NOTE — PSYCH
Virtual Regular Visit    Verification of patient location:    Patient is located at Home in the following state in which I hold an active license [place]; [place]      Assessment/Plan:    Problem List Items Addressed This Visit    None  Visit Diagnoses       Generalized anxiety disorder    -  Primary            Goals addressed in session: Goal 1          Reason for visit is   Chief Complaint   Patient presents with    Virtual Regular Visit          Encounter provider Maria Alejandra Arteaga    Provider located at [facility] THERAPIST [facility]  [facility] THERAPIST MENTAL HEALTH OUTPATIENT  [address] [address] [phone]      Recent Visits  No visits were found meeting these conditions.  Showing recent visits within past 7 days and meeting all other requirements  Today's Visits  Date Type Provider Dept   24 Telemedicine Maria Alejandra Arteaga [facility] Therapist [facility]   Showing today's visits and meeting all other requirements  Future Appointments  No visits were found meeting these conditions.  Showing future appointments within next 150 days and meeting all other requirements       The patient was identified by name and date of birth. Judith Velazquez was informed that this is a telemedicine visit and that the visit is being conducted throughthe Epic Embedded platform. She agrees to proceed..  My office door was closed. No one else was in the room.  She acknowledged consent and understanding of privacy and security of the video platform. The patient has agreed to participate and understands they can discontinue the visit at any time.    Patient is aware this is a billable service.     Subjective  Judith Velazquez is a 53 y.o. female .      HPI     Past Medical History:   Diagnosis Date    Carpal tunnel syndrome     Depression     Diabetes 1.5, managed as type 2 (HCC)        Past Surgical History:   Procedure Laterality Date     SECTION       2001, 2004, 2011    HERNIA REPAIR Left     age 6    HERNIA REPAIR Right     age 5    TUBAL LIGATION      2011       Current Outpatient Medications   Medication Sig Dispense Refill    Blood Glucose Monitoring Suppl (Contour Next One) JOHN       Contour Next Test test strip daily Test as directed      metFORMIN (GLUCOPHAGE-XR) 500 mg 24 hr tablet       Microlet Lancets MISC       sertraline (Zoloft) 25 mg tablet Take 1 tablet (25 mg total) by mouth daily With 50 mg 30 tablet 2    sertraline (Zoloft) 50 mg tablet Take 1 tablet (50 mg total) by mouth daily 30 tablet 2     No current facility-administered medications for this visit.        No Known Allergies    Review of Systems    Video Exam    There were no vitals filed for this visit.    Physical Exam     Behavioral Health Psychotherapy Progress Note    Psychotherapy Provided: Individual Psychotherapy     1. Generalized anxiety disorder            Goals addressed in session: Goal 1     DATA: Client presented for a telehealth session via [employer] Client reported that she was doing well overall. Client shared that the [facility] festival had gone well. She mentioned that her  helped her in the beginning and was able to get help throughout the rest of the event. 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 She emphasized that she highlighted the beer that was in honor of her father and how she was asked to be interviewed to talk about the beer and the organization. Client spoke in-length around her mom and how she and her siblings informed her about the assistant living place. Together, they had taken her to tour the facility, check out her room and meet staff and other residents. Client addressed that two staff members that her mom was friends with (when she worked at the facility) still work there and will also be part of her treatment team. Client disclosed that her mom will be moving in  "next week, so they are trying to make this as smooth of a transition as possible. Clinician actively listened, provided emotional support, validated client's feelings, addressed and processed current events, discussed the challenges with change and supporting client with making these changes, emphasize the importance of utilizing family support and explored coping skills.   During this session, this clinician used the following therapeutic modalities: Client-centered Therapy, Cognitive Behavioral Therapy, and Supportive Psychotherapy    Substance Abuse was not addressed during this session. If the client is diagnosed with a co-occurring substance use disorder, please indicate any changes in the frequency or amount of use: N/A. Stage of change for addressing substance use diagnoses: No substance use/Not applicable    ASSESSMENT:  Alayna Velazquez presents with a Euthymic/ normal mood.     her affect is Normal range and intensity, which is congruent, with her mood and the content of the session. The client has made progress on their goals.     Alayna Velazquez presents with a low risk of suicide, low risk of self-harm, and low risk of harm to others.    For any risk assessment that surpasses a \"low\" rating, a safety plan must be developed.    A safety plan was indicated: no  If yes, describe in detail N/A    PLAN: Between sessions, Alayna Velazquez will continue to utilize coping skills to manage stress/anxiety. At the next session, the therapist will use Client-centered Therapy, Cognitive Behavioral Therapy, and Supportive Psychotherapy to address anxiety.    Behavioral Health Treatment Plan and Discharge Planning: Alayna Velazquez is aware of and agrees to continue to work on their treatment plan. They have identified and are working toward their discharge goals. yes    Visit start and stop times:    03/06/24  Start Time: 0803  Stop Time: 0900  Total Visit Time: 57 minutes        "

## 2024-03-18 ENCOUNTER — TELEMEDICINE (OUTPATIENT)
Dept: BEHAVIORAL/MENTAL HEALTH CLINIC | Facility: CLINIC | Age: 54
End: 2024-03-18
Payer: COMMERCIAL

## 2024-03-18 DIAGNOSIS — F41.1 GENERALIZED ANXIETY DISORDER: Primary | ICD-10-CM

## 2024-03-18 PROCEDURE — 90834 PSYTX W PT 45 MINUTES: CPT | Performed by: SOCIAL WORKER

## 2024-03-18 NOTE — PSYCH
Virtual Regular Visit    Verification of patient location:    Patient is located at Home in the following state in which I hold an active license [place]; [place] lic[place]       Assessment/Plan:    Problem List Items Addressed This Visit    None  Visit Diagnoses       Generalized anxiety disorder    -  Primary            Goals addressed in session: Goal 1          Reason for visit is   Chief Complaint   Patient presents with    Virtual Regular Visit          Encounter provider Maria Alejandra Arteaga    Provider located at [facility]  [facility] M[facility] OUTPAT[facility]  [address] [facility] [id number] [phone]      Recent Visits  No visits were found meeting these conditions.  Showing recent visits within past 7 days and meeting all other requirements  Today's Visits  Date Type Provider Dept   24 Telemedicine Maria Alejandra Arteaga [facility]   Showing today's visits and meeting all other requirements  Future Appointments  No visits were found meeting these conditions.  Showing future appointments within next 150 days and meeting all other requirements       The patient was identified by name and date of birth. Judith Velazquez was informed that this is a telemedicine visit and that the visit is being conducted throughthe Epic Embedded platform. She agrees to proceed..  My office door was closed. No one else was in the room.  She acknowledged consent and understanding of privacy and security of the video platform. The patient has agreed to participate and understands they can discontinue the visit at any time.    Patient is aware this is a billable service.     Subjective  Judith Velazquez is a 53 y.o. female .      HPI     Past Medical History:   Diagnosis Date    Carpal tunnel syndrome     Depression     Diabetes 1.5, managed as type 2 (HCC)        Past Surgical History:   Procedure Laterality Date     SECTION       2001, 2004, 2011    HERNIA REPAIR Left     age 6    HERNIA REPAIR Right     age 5    TUBAL LIGATION      2011       Current Outpatient Medications   Medication Sig Dispense Refill    Blood Glucose Monitoring Suppl (Contour Next One) JOHN       Contour Next Test test strip daily Test as directed      metFORMIN (GLUCOPHAGE-XR) 500 mg 24 hr tablet       Microlet Lancets MISC       sertraline (Zoloft) 25 mg tablet Take 1 tablet (25 mg total) by mouth daily With 50 mg 30 tablet 2    sertraline (Zoloft) 50 mg tablet Take 1 tablet (50 mg total) by mouth daily 30 tablet 2     No current facility-administered medications for this visit.        No Known Allergies    Review of Systems    Video Exam    There were no vitals filed for this visit.    Physical Exam     Behavioral Health Psychotherapy Progress Note    Psychotherapy Provided: Individual Psychotherapy     1. Generalized anxiety disorder            Goals addressed in session: Goal 1     DATA: Client presented for a telehealth session via [employer]. Client reported that something positive took place and something negative took place. Client disclosed the positive thing that took place the last couple weeks. She shared that her mom is finally moved into the nursing home. She expressed that her mom has been adjusting well and becoming comfortable with the situation. She voiced that her family had helped her move in this past weekend and how it was a pretty smooth transition. She highlighted that her mom has already met her neighbors, befriended some of them and already knows a couple staff members. Client emphasized the importance of making sure her mom was comfortable. She noted that they had decorated her room similar to what she is used to at the house and how she adjusted well. Client expressed how happy she was to know that her mom is doing well. Client disclosed that her  got laid off from work for the third time this year and how it has taken a toll on  "him mentally. She mentioned that he is having a hard time not taking it personally. Client addressed that she has been providing him emotional support. She addressed the importance of getting him into a job that provides him purpose. She noted that he is an amputee and trying to utilize his experience to help others. She mentioned that he could also look into getting a job at one of the local [employer]. She shared that they plan to go to the [employer] to look at local jobs. Clinician actively listened, provided emotional support, validated client's feelings, addressed and processed current events, discussed looking into a Certified Peer Specialist role for her  (sent over recent information pertaining to this for client to review with ), emphasized the importance of providing emotional support and validation towards her  and explored coping skills.  During this session, this clinician used the following therapeutic modalities: Client-centered Therapy, Cognitive Behavioral Therapy, and Supportive Psychotherapy    Substance Abuse was not addressed during this session. If the client is diagnosed with a co-occurring substance use disorder, please indicate any changes in the frequency or amount of use: N/A. Stage of change for addressing substance use diagnoses: No substance use/Not applicable    ASSESSMENT:  Alayna Velazquez presents with a Euthymic/ normal mood.     her affect is Normal range and intensity, which is congruent, with her mood and the content of the session. The client has made progress on their goals.     Alayna Velazquez presents with a low risk of suicide, low risk of self-harm, and low risk of harm to others.    For any risk assessment that surpasses a \"low\" rating, a safety plan must be developed.    A safety plan was indicated: no  If yes, describe in detail N/A    PLAN: Between sessions, Alayna Velazquez will continue to utilize coping skills to manage " stress/anxiety. Will look into the CPS requirements with her . At the next session, the therapist will use Client-centered Therapy, Cognitive Behavioral Therapy, and Supportive Psychotherapy to address anxiety.    Behavioral Health Treatment Plan and Discharge Planning: Alayna Velazquez is aware of and agrees to continue to work on their treatment plan. They have identified and are working toward their discharge goals. yes    Visit start and stop times:    03/18/24  Start Time: 0905  Stop Time: 0955  Total Visit Time: 50 minutes

## 2024-03-19 ENCOUNTER — OFFICE VISIT (OUTPATIENT)
Dept: ENDOCRINOLOGY | Facility: HOSPITAL | Age: 54
End: 2024-03-19
Payer: COMMERCIAL

## 2024-03-19 VITALS
WEIGHT: 243 LBS | SYSTOLIC BLOOD PRESSURE: 124 MMHG | HEART RATE: 64 BPM | HEIGHT: 66 IN | BODY MASS INDEX: 39.05 KG/M2 | DIASTOLIC BLOOD PRESSURE: 80 MMHG

## 2024-03-19 DIAGNOSIS — E66.01 CLASS 3 OBESITY (HCC): ICD-10-CM

## 2024-03-19 DIAGNOSIS — F33.1 MAJOR DEPRESSIVE DISORDER, RECURRENT EPISODE, MODERATE (HCC): ICD-10-CM

## 2024-03-19 DIAGNOSIS — Z79.4 TYPE 2 DIABETES MELLITUS WITH HYPERGLYCEMIA, WITH LONG-TERM CURRENT USE OF INSULIN (HCC): Primary | ICD-10-CM

## 2024-03-19 DIAGNOSIS — E78.2 MIXED HYPERLIPIDEMIA: ICD-10-CM

## 2024-03-19 DIAGNOSIS — E11.65 TYPE 2 DIABETES MELLITUS WITH HYPERGLYCEMIA, WITH LONG-TERM CURRENT USE OF INSULIN (HCC): Primary | ICD-10-CM

## 2024-03-19 LAB — SL AMB POCT HEMOGLOBIN AIC: 6.4 (ref ?–6.5)

## 2024-03-19 PROCEDURE — 83036 HEMOGLOBIN GLYCOSYLATED A1C: CPT | Performed by: STUDENT IN AN ORGANIZED HEALTH CARE EDUCATION/TRAINING PROGRAM

## 2024-03-19 PROCEDURE — 99244 OFF/OP CNSLTJ NEW/EST MOD 40: CPT | Performed by: STUDENT IN AN ORGANIZED HEALTH CARE EDUCATION/TRAINING PROGRAM

## 2024-03-19 RX ORDER — METOPROLOL SUCCINATE 25 MG/1
25 TABLET, EXTENDED RELEASE ORAL DAILY
COMMUNITY
Start: 2024-03-01

## 2024-03-19 RX ORDER — BLOOD-GLUCOSE METER
1 EACH MISCELLANEOUS DAILY
Qty: 1 EACH | Refills: 0 | Status: SHIPPED | OUTPATIENT
Start: 2024-03-19

## 2024-03-19 RX ORDER — LANCETS
EACH MISCELLANEOUS DAILY
Qty: 100 EACH | Refills: 1 | Status: SHIPPED | OUTPATIENT
Start: 2024-03-19

## 2024-03-19 RX ORDER — SAME BUTANEDISULFONATE/BETAINE 400-600 MG
POWDER IN PACKET (EA) ORAL
COMMUNITY

## 2024-03-19 RX ORDER — ASCORBIC ACID 100 MG
TABLET,CHEWABLE ORAL
COMMUNITY

## 2024-03-19 RX ORDER — MAGNESIUM GLYCINATE 100 MG
CAPSULE ORAL
COMMUNITY
Start: 2023-11-01

## 2024-03-19 RX ORDER — CHLORAL HYDRATE 500 MG
CAPSULE ORAL
COMMUNITY
Start: 2023-11-01

## 2024-03-19 RX ORDER — METFORMIN HYDROCHLORIDE 500 MG/1
1500 TABLET, EXTENDED RELEASE ORAL
Qty: 400 TABLET | Refills: 1 | Status: SHIPPED | OUTPATIENT
Start: 2024-03-19

## 2024-03-19 RX ORDER — VITAMIN B COMPLEX
CAPSULE ORAL
COMMUNITY

## 2024-03-19 RX ORDER — METFORMIN HYDROCHLORIDE 500 MG/1
2000 TABLET, EXTENDED RELEASE ORAL
Qty: 400 TABLET | Refills: 1 | Status: SHIPPED | OUTPATIENT
Start: 2024-03-19 | End: 2024-03-19

## 2024-03-19 NOTE — PROGRESS NOTES
"    New Patient Consult Note      Chief Complaint   Patient presents with    Diabetes      Referring Provider  Khushbu Cook Md  [address] [address]     History of Present Illness:   Judith Velazquez is a 53 y.o. female with a history of type 2 diabetes Without any reported complications with other PMHx of htn, hlp, class 3 obesity, MDD, MARI, ASHLEY who presents today for diabetes management.     Patient was first diagnosed with T2DM- dx dec 2023 was when she was first told. Also reports was dx with GDM with 3rd son  managed via lifestyle.   Control since diagnosis:- no previous data, ?7%  Medications tried thus far: below   Current regime:- Metformin 1500mg daily dinner  BG control:- checks but not enough. Once a day and generally in 135 but reports generally when she feels sick. Can also go low into .   Hypoglycemic episodes:   Hyperglycemia symptoms: no polyuria or polydipsia\",\"no chest pain, dyspnea or TIAs\",\"no numbness, tingling or pain in extremities\"  Diet: is trying to watch her diet, but  cooks and sometimes takes portion it well.   Activity: has been trying to get out and do some walking  Weight- did lost 50lbs previously in her 40's and now slowly gaining it back d/t being menopausal.     Opthamology: has exam few months ago and reportedly was normal, no retinopathy, no macular edema  Hx of hyperlipidemia: Yes  Hx of hypertension: Yes  Last Lipid:- none  Last urine microalbumin: none  Last hbA1C: POC 6.4%, No previous A1C none    Social Hx/Family hx- both parents have diabetes,  has diabetes and is on insulin    Patient Active Problem List   Diagnosis    Major depressive disorder, recurrent episode, moderate (HCC)    Attention deficit hyperactivity disorder, combined type    Depression      Past Medical History:   Diagnosis Date    Carpal tunnel syndrome     Depression     Diabetes 1.5, managed as type 2 (HCC)       Past Surgical History:   Procedure Laterality " "Date     SECTION      , ,     HERNIA REPAIR Left     age 6    HERNIA REPAIR Right     age 5    TUBAL LIGATION            Family History   Problem Relation Age of Onset    Diabetes Mother     Diabetes Father     ALS Father 76    Heart attack Father 50     Social History     Tobacco Use    Smoking status: Never    Smokeless tobacco: Never   Substance Use Topics    Alcohol use: Yes     No Known Allergies      Current Outpatient Medications:     Ascorbic Acid (Vitamin C) 100 MG CHEW, Chew, Disp: , Rfl:     B Complex CAPS, Take by mouth, Disp: , Rfl:     Blood Glucose Monitoring Suppl (Contour Next One) JOHN, Inject 1 each under the skin daily, Disp: 1 each, Rfl: 0    Contour Next Test test strip, Use 1 each daily Test as directed- 1x daily, Disp: 100 each, Rfl: 1    Magnesium 100 MG CAPS, Take by mouth, Disp: , Rfl:     Magnesium Glycinate 100 MG CAPS, , Disp: , Rfl:     metFORMIN (GLUCOPHAGE-XR) 500 mg 24 hr tablet, Take 4 tablets (2,000 mg total) by mouth daily with dinner 1500mg at dinner, Disp: 400 tablet, Rfl: 1    metoprolol succinate (TOPROL-XL) 25 mg 24 hr tablet, Take 25 mg by mouth daily, Disp: , Rfl:     Microlet Lancets MISC, Inject under the skin in the morning Test BG 1x daily, Disp: 100 each, Rfl: 1    Omega-3 1000 MG CAPS, , Disp: , Rfl:     sertraline (Zoloft) 50 mg tablet, Take 1 tablet (50 mg total) by mouth daily, Disp: 30 tablet, Rfl: 2    Vitamin D-Vitamin K (K2-D3 10,000) CAPS, Take by mouth, Disp: , Rfl:   Review of Systems   Constitutional:  Negative for diaphoresis, fatigue and unexpected weight change.   Eyes:  Negative for photophobia and visual disturbance.   Gastrointestinal:  Negative for constipation, diarrhea and vomiting.   Endocrine: Negative for polydipsia and polyuria.   Skin: Negative.        Physical Exam:  Body mass index is 39.82 kg/m².  /80   Pulse 64   Ht 5' 5.5\" (1.664 m)   Wt 110 kg (243 lb)   BMI 39.82 kg/m²    Wt Readings from Last 3 " Encounters:   03/19/24 110 kg (243 lb)   10/31/23 109 kg (241 lb)       Physical Exam  Constitutional:       Appearance: Normal appearance. She is obese.   Cardiovascular:      Rate and Rhythm: Normal rate and regular rhythm.      Pulses: Normal pulses. no weak pulses.           Dorsalis pedis pulses are 2+ on the right side and 2+ on the left side.   Pulmonary:      Effort: Pulmonary effort is normal.   Abdominal:      General: Abdomen is flat.      Palpations: Abdomen is soft.   Musculoskeletal:      Cervical back: Normal range of motion and neck supple.   Feet:      Right foot:      Skin integrity: No ulcer, skin breakdown, erythema, warmth, callus or dry skin.      Left foot:      Skin integrity: No ulcer, skin breakdown, erythema, warmth, callus or dry skin.   Skin:     Capillary Refill: Capillary refill takes less than 2 seconds.   Neurological:      General: No focal deficit present.      Mental Status: She is alert and oriented to person, place, and time.   Psychiatric:         Mood and Affect: Mood normal.       Patient's shoes and socks removed.    Right Foot/Ankle   Right Foot Inspection  Skin Exam: skin normal and skin intact. No dry skin, no warmth, no callus, no erythema, no maceration, no abnormal color, no pre-ulcer, no ulcer and no callus.     Toe Exam: ROM and strength within normal limits.     Sensory   Vibration: intact  Monofilament testing: intact    Vascular  Capillary refills: < 3 seconds  The right DP pulse is 2+.     Left Foot/Ankle  Left Foot Inspection  Skin Exam: skin normal and skin intact. No dry skin, no warmth, no erythema, no maceration, normal color, no pre-ulcer, no ulcer and no callus.     Toe Exam: ROM and strength within normal limits.     Sensory   Vibration: intact  Monofilament testing: intact    Vascular  Capillary refills: < 3 seconds  The left DP pulse is 2+.     Assign Risk Category  No deformity present  No loss of protective sensation  No weak pulses  Risk:  0      Labs:   No labs     Impression:  1. Type 2 diabetes mellitus with hyperglycemia, with long-term current use of insulin (formerly [facility])    2. Mixed hyperlipidemia    3. Class 3 obesity (formerly [facility])    4. Major depressive disorder, recurrent episode, moderate (formerly [facility])           Plan:    Alayna was seen today for diabetes.    Diagnoses and all orders for this visit:    Type 2 diabetes mellitus with hyperglycemia, with long-term current use of insulin (formerly [facility])  -     POCT hemoglobin A1c  -     Comprehensive metabolic panel; Future  -     Albumin / creatinine urine ratio; Future  -     Lipid panel; Future  -     Ambulatory referral to Diabetic Education; Future  -     Comprehensive metabolic panel  -     Albumin / creatinine urine ratio  -     Lipid panel  -     Blood Glucose Monitoring Suppl (Contour Next One) JOHN; Inject 1 each under the skin daily  -     Contour Next Test test strip; Use 1 each daily Test as directed- 1x daily  -     Microlet Lancets MISC; Inject under the skin in the morning Test BG 1x daily  -     metFORMIN (GLUCOPHAGE-XR) 500 mg 24 hr tablet; Take 4 tablets (2,000 mg total) by mouth daily with dinner 1500mg at dinner    Mixed hyperlipidemia  -     POCT hemoglobin A1c  -     Comprehensive metabolic panel; Future  -     Albumin / creatinine urine ratio; Future  -     Lipid panel; Future  -     Ambulatory referral to Diabetic Education; Future  -     Comprehensive metabolic panel  -     Albumin / creatinine urine ratio  -     Lipid panel  -     Blood Glucose Monitoring Suppl (Contour Next One) JOHN; Inject 1 each under the skin daily  -     Contour Next Test test strip; Use 1 each daily Test as directed- 1x daily  -     Microlet Lancets MISC; Inject under the skin in the morning Test BG 1x daily  -     metFORMIN (GLUCOPHAGE-XR) 500 mg 24 hr tablet; Take 4 tablets (2,000 mg total) by mouth daily with dinner 1500mg at dinner    Class 3 obesity (formerly [facility])  -     POCT hemoglobin A1c  -     Comprehensive metabolic panel;  Future  -     Albumin / creatinine urine ratio; Future  -     Lipid panel; Future  -     Ambulatory referral to Diabetic Education; Future  -     Comprehensive metabolic panel  -     Albumin / creatinine urine ratio  -     Lipid panel  -     Blood Glucose Monitoring Suppl (Contour Next One) JOHN; Inject 1 each under the skin daily  -     Contour Next Test test strip; Use 1 each daily Test as directed- 1x daily  -     Microlet Lancets MISC; Inject under the skin in the morning Test BG 1x daily  -     metFORMIN (GLUCOPHAGE-XR) 500 mg 24 hr tablet; Take 4 tablets (2,000 mg total) by mouth daily with dinner 1500mg at dinner    Major depressive disorder, recurrent episode, moderate (HCC)  -     POCT hemoglobin A1c  -     Comprehensive metabolic panel; Future  -     Albumin / creatinine urine ratio; Future  -     Lipid panel; Future  -     Ambulatory referral to Diabetic Education; Future  -     Comprehensive metabolic panel  -     Albumin / creatinine urine ratio  -     Lipid panel  -     Blood Glucose Monitoring Suppl (Contour Next One) JOHN; Inject 1 each under the skin daily  -     Contour Next Test test strip; Use 1 each daily Test as directed- 1x daily  -     Microlet Lancets MISC; Inject under the skin in the morning Test BG 1x daily  -     metFORMIN (GLUCOPHAGE-XR) 500 mg 24 hr tablet; Take 4 tablets (2,000 mg total) by mouth daily with dinner 1500mg at dinner      There are no diagnoses linked to this encounter.      Patient is a 53yF With PMHx of DM since dec 2023, hx of GDM in past, Without any reported complications with Other PMHx of htn, hlp, ASHLEY, class 3 obesity, MDD, anxiety Who presents today for diabetic care.     1) T2DM:- we do not have history of previous A1C, no records send for visit. POC today was 6.4% which was in prediabetic range. Patient currently only on metformin 1500mg daily. Discussed diet/exercise recommendations, Will also refer to CDE for further discussion. Recommend checking BG 2-3x  weekly. Meter send. Goal BG fasting , premeal  and 2hrs post <180. If BG not at goal consistently please reach out to us. Labs for screening ordered as well. Discussed importance of weight management to help with BG management. For now given labs in prediabetic range, recommend no changes to medications, rather continue to work on lifestyle choices and see CDE to help with this     Plan   - c/w metformin 1500mg daily   - CDE visit  - check BG 2-3x weekly- alternate times     Screening:-   Retinopathy-  UTD  Nephropathy- not UTD  Lipide levels- not UTD    2) Hypertension:- BP in clinic 124/80,     3) Hyperlipidemia:- No lipid, Will order    RTC in 3 months     Discussed with the patient and all questioned fully answered. She will call me if any problems arise.    Counseled patient on diagnostic results, prognosis, risk and benefit of treatment options, instruction for management, importance of treatment compliance, Risk  factor reduction and impressions      Jess Katz MD

## 2024-03-26 ENCOUNTER — TELEMEDICINE (OUTPATIENT)
Dept: PSYCHIATRY | Facility: CLINIC | Age: 54
End: 2024-03-26
Payer: COMMERCIAL

## 2024-03-26 DIAGNOSIS — F41.1 GENERALIZED ANXIETY DISORDER: ICD-10-CM

## 2024-03-26 DIAGNOSIS — F32.A DEPRESSION, UNSPECIFIED DEPRESSION TYPE: Primary | ICD-10-CM

## 2024-03-26 PROCEDURE — 99214 OFFICE O/P EST MOD 30 MIN: CPT | Performed by: PSYCHIATRY & NEUROLOGY

## 2024-03-26 RX ORDER — SERTRALINE HYDROCHLORIDE 25 MG/1
25 TABLET, FILM COATED ORAL DAILY
Qty: 30 TABLET | Refills: 2 | Status: SHIPPED | OUTPATIENT
Start: 2024-03-26

## 2024-03-26 NOTE — PSYCH
Virtual Regular Visit    Verification of patient location:    Patient is located at Home in the following state in which I hold an active license [place]      Assessment/Plan:    Problem List Items Addressed This Visit    None      Goals addressed in session: Goal 1          Reason for visit is   Chief Complaint   Patient presents with    Medication Management        Encounter provider Florecita Merlos MD    Provider located at [facility] MENTAL HEALTH OUTPATIENT  [address] [facility] [id number] [phone]      Recent Visits  No visits were found meeting these conditions.  Showing recent visits within past 7 days and meeting all other requirements  Today's Visits  Date Type Provider Dept   03/26/24 Telemedicine Florecita Merlos MD [facility]   Showing today's visits and meeting all other requirements  Future Appointments  No visits were found meeting these conditions.  Showing future appointments within next 150 days and meeting all other requirements       The patient was identified by name and date of birth. Judith Velazquez was informed that this is a telemedicine visit and that the visit is being conducted throughthe Epic Embedded platform. She agrees to proceed..  My office door was closed. No one else was in the room.  She acknowledged consent and understanding of privacy and security of the video platform. The patient has agreed to participate and understands they can discontinue the visit at any time.    Patient is aware this is a billable service.     Subjective  Judith Velazquez is a 53 y.o. female with depression and anxiety presents for regular f/u  .  Taper down zoloft was started last appt; pt decreased to 50 mg 2 weeks ago; denies withdrawn spx  Plans ENT f/u and sleep study to r/o ASHLEY  Cardio f/u for atrial tachycardia  Mother - in [place];  lost his job  Pt continues to work    HPI   Mood - pt reports feeling good and stable; chronic low energy  and feeling tired, but does ADLs, stays social and active; functions at baseline  Anxiety - controlled; denies panic attacks or racing thoughts  Past Medical History:   Diagnosis Date    Carpal tunnel syndrome     Depression     Diabetes 1.5, managed as type 2 (HCC)        Past Surgical History:   Procedure Laterality Date     SECTION      , ,     HERNIA REPAIR Left     age 6    HERNIA REPAIR Right     age 5    TUBAL LIGATION             Current Outpatient Medications   Medication Sig Dispense Refill    Ascorbic Acid (Vitamin C) 100 MG CHEW Chew      B Complex CAPS Take by mouth      Blood Glucose Monitoring Suppl (Contour Next One) JOHN Inject 1 each under the skin daily 1 each 0    Contour Next Test test strip Use 1 each daily Test as directed- 1x daily 100 each 1    Magnesium 100 MG CAPS Take by mouth      Magnesium Glycinate 100 MG CAPS       metFORMIN (GLUCOPHAGE-XR) 500 mg 24 hr tablet Take 3 tablets (1,500 mg total) by mouth daily with dinner 1500mg at dinner 400 tablet 1    metoprolol succinate (TOPROL-XL) 25 mg 24 hr tablet Take 25 mg by mouth daily      Microlet Lancets MISC Inject under the skin in the morning Test BG 1x daily 100 each 1    Omega-3 1000 MG CAPS       sertraline (Zoloft) 50 mg tablet Take 1 tablet (50 mg total) by mouth daily 30 tablet 2    Vitamin D-Vitamin K (K2-D3 10,000) CAPS Take by mouth       No current facility-administered medications for this visit.        No Known Allergies    Review of Systems   Constitutional:  Negative for activity change and appetite change.   Psychiatric/Behavioral:  Negative for dysphoric mood, sleep disturbance and suicidal ideas. The patient is not nervous/anxious.        Video Exam    There were no vitals filed for this visit.    Physical Exam  Constitutional:       Appearance: Normal appearance. She is normal weight.   Neurological:      Mental Status: She is alert.   Psychiatric:         Attention and Perception: Attention and  perception normal.         Mood and Affect: Mood and affect normal.         Speech: Speech normal.         Behavior: Behavior normal. Behavior is cooperative.         Thought Content: Thought content normal.         Judgment: Judgment normal.          Visit Time    Visit Start Time: 8.01 am   Visit Stop Time: 8.09 am  Total Visit Duration:  20 minutes

## 2024-04-03 ENCOUNTER — TELEMEDICINE (OUTPATIENT)
Dept: BEHAVIORAL/MENTAL HEALTH CLINIC | Facility: CLINIC | Age: 54
End: 2024-04-03
Payer: COMMERCIAL

## 2024-04-03 DIAGNOSIS — F41.1 GENERALIZED ANXIETY DISORDER: Primary | ICD-10-CM

## 2024-04-03 LAB
ALBUMIN SERPL-MCNC: 4.2 G/DL (ref 3.8–4.9)
ALBUMIN/CREAT UR: 4 MG/G CREAT (ref 0–29)
ALBUMIN/GLOB SERPL: 1.7 {RATIO} (ref 1.2–2.2)
ALP SERPL-CCNC: 78 IU/L (ref 44–121)
ALT SERPL-CCNC: 17 IU/L (ref 0–32)
AST SERPL-CCNC: 12 IU/L (ref 0–40)
BILIRUB SERPL-MCNC: 0.2 MG/DL (ref 0–1.2)
BUN SERPL-MCNC: 14 MG/DL (ref 6–24)
BUN/CREAT SERPL: 22 (ref 9–23)
CALCIUM SERPL-MCNC: 9.9 MG/DL (ref 8.7–10.2)
CHLORIDE SERPL-SCNC: 102 MMOL/L (ref 96–106)
CHOLEST SERPL-MCNC: 256 MG/DL (ref 100–199)
CHOLEST/HDLC SERPL: 5.6 RATIO (ref 0–4.4)
CO2 SERPL-SCNC: 25 MMOL/L (ref 20–29)
CREAT SERPL-MCNC: 0.65 MG/DL (ref 0.57–1)
CREAT UR-MCNC: 98.4 MG/DL
EGFR: 107 ML/MIN/1.73
GLOBULIN SER-MCNC: 2.5 G/DL (ref 1.5–4.5)
GLUCOSE SERPL-MCNC: 110 MG/DL (ref 70–99)
HDLC SERPL-MCNC: 46 MG/DL
LDLC SERPL CALC-MCNC: 171 MG/DL (ref 0–99)
MICROALBUMIN UR-MCNC: 3.5 UG/ML
POTASSIUM SERPL-SCNC: 5.6 MMOL/L (ref 3.5–5.2)
PROT SERPL-MCNC: 6.7 G/DL (ref 6–8.5)
SL AMB VLDL CHOLESTEROL CALC: 39 MG/DL (ref 5–40)
SODIUM SERPL-SCNC: 141 MMOL/L (ref 134–144)
TRIGL SERPL-MCNC: 210 MG/DL (ref 0–149)

## 2024-04-03 PROCEDURE — 90834 PSYTX W PT 45 MINUTES: CPT | Performed by: SOCIAL WORKER

## 2024-04-03 NOTE — PSYCH
Virtual Regular Visit    Verification of patient location:    Patient is located at Home in the following state in which I hold an active license Other; [place] working towards [place]      Assessment/Plan:    Problem List Items Addressed This Visit    None  Visit Diagnoses       Generalized anxiety disorder    -  Primary            Goals addressed in session: Goal 1          Reason for visit is   Chief Complaint   Patient presents with    Virtual Regular Visit          Encounter provider Maria Alejandra Arteaga    Provider located at [facility] THERAPIST [facility]  [facility] THERAPIST MENTAL HEALTH OUTPATIENT  [address] [facility] [facility] [id number] [phone]      Recent Visits  No visits were found meeting these conditions.  Showing recent visits within past 7 days and meeting all other requirements  Today's Visits  Date Type Provider Dept   24 Telemedicine Maria Alejandra Arteaga [facility] Therapist [facility]   Showing today's visits and meeting all other requirements  Future Appointments  No visits were found meeting these conditions.  Showing future appointments within next 150 days and meeting all other requirements       The patient was identified by name and date of birth. Judith Velazquez was informed that this is a telemedicine visit and that the visit is being conducted throughthe Epic Embedded platform. She agrees to proceed..  My office door was closed. No one else was in the room.  She acknowledged consent and understanding of privacy and security of the video platform. The patient has agreed to participate and understands they can discontinue the visit at any time.    Patient is aware this is a billable service.     Subjective  Judith Velazquez is a 53 y.o. female .      HPI     Past Medical History:   Diagnosis Date    Carpal tunnel syndrome     Depression     Diabetes 1.5, managed as type 2 (HCC)        Past Surgical History:   Procedure Laterality Date     SECTION       2001, 2004, 2011    HERNIA REPAIR Left     age 6    HERNIA REPAIR Right     age 5    TUBAL LIGATION      2011       Current Outpatient Medications   Medication Sig Dispense Refill    Ascorbic Acid (Vitamin C) 100 MG CHEW Chew      B Complex CAPS Take by mouth      Blood Glucose Monitoring Suppl (Contour Next One) JOHN Inject 1 each under the skin daily 1 each 0    Contour Next Test test strip Use 1 each daily Test as directed- 1x daily 100 each 1    Magnesium 100 MG CAPS Take by mouth      Magnesium Glycinate 100 MG CAPS       metFORMIN (GLUCOPHAGE-XR) 500 mg 24 hr tablet Take 3 tablets (1,500 mg total) by mouth daily with dinner 1500mg at dinner 400 tablet 1    metoprolol succinate (TOPROL-XL) 25 mg 24 hr tablet Take 25 mg by mouth daily      Microlet Lancets MISC Inject under the skin in the morning Test BG 1x daily 100 each 1    Omega-3 1000 MG CAPS       sertraline (Zoloft) 25 mg tablet Take 1 tablet (25 mg total) by mouth daily 30 tablet 2    Vitamin D-Vitamin K (K2-D3 10,000) CAPS Take by mouth       No current facility-administered medications for this visit.        No Known Allergies    Review of Systems    Video Exam    There were no vitals filed for this visit.    Physical Exam     Behavioral Health Psychotherapy Progress Note    Psychotherapy Provided: Individual Psychotherapy     1. Generalized anxiety disorder            Goals addressed in session: Goal 1     DATA: Client presented for a telehealth session via [employer]. Client reported that she was reviewing the activities calendar for her mom, stating that she has been really enjoying the assistant living facility. Client plans to visit her today and possibly Friday. Client talked about Easter and how they hosted the holiday at the home. She mentioned that she had baked cookies. Client expressed that the holiday went well overall and how happy she was to see everyone. Client shared that she has OT for her carpel tunnel today. She addressed that  "she has been working on the daily exercises her OT had provided. Client spoke in-length around work and how communication has bee lagging. She disclosed feeling disconnected. She was able to speak to her boss around this and discuss a plan moving forward to keep her in the loop (especially with activities being held at the [employer]). Clinician actively listened, provided emotional support, validated client's feelings, addressed and processed current events, explored applications that can be used for the employees to keep in contact with everyone (I.e. slack)  During this session, this clinician used the following therapeutic modalities: Client-centered Therapy, Cognitive Behavioral Therapy, and Supportive Psychotherapy    Substance Abuse was not addressed during this session. If the client is diagnosed with a co-occurring substance use disorder, please indicate any changes in the frequency or amount of use: N/A. Stage of change for addressing substance use diagnoses: No substance use/Not applicable    ASSESSMENT:  Alayna Velazquez presents with a Euthymic/ normal mood.     her affect is Normal range and intensity, which is congruent, with her mood and the content of the session. The client has made progress on their goals.     Alayna Velazquez presents with a low risk of suicide, low risk of self-harm, and low risk of harm to others.    For any risk assessment that surpasses a \"low\" rating, a safety plan must be developed.    A safety plan was indicated: no  If yes, describe in detail N/A    PLAN: Between sessions, Alayna Velazquez will continue to utilize coping skills to manage stress/anxiety and use applications to help communicate with the team. At the next session, the therapist will use Client-centered Therapy, Cognitive Behavioral Therapy, and Supportive Psychotherapy to address anxiety.    Behavioral Health Treatment Plan and Discharge Planning: Alayna Velazquez is aware of and agrees to continue to work on their " treatment plan. They have identified and are working toward their discharge goals. yes    Visit start and stop times:    04/03/24  Start Time: 0811  Stop Time: 0900  Total Visit Time: 49 minutes

## 2024-04-09 ENCOUNTER — OFFICE VISIT (OUTPATIENT)
Dept: CARDIOLOGY CLINIC | Facility: CLINIC | Age: 54
End: 2024-04-09
Payer: COMMERCIAL

## 2024-04-09 VITALS
BODY MASS INDEX: 38.89 KG/M2 | OXYGEN SATURATION: 98 % | HEIGHT: 66 IN | HEART RATE: 64 BPM | SYSTOLIC BLOOD PRESSURE: 118 MMHG | WEIGHT: 242 LBS | DIASTOLIC BLOOD PRESSURE: 68 MMHG

## 2024-04-09 DIAGNOSIS — R00.2 INTERMITTENT PALPITATIONS: Primary | ICD-10-CM

## 2024-04-09 DIAGNOSIS — I49.3 PVC'S (PREMATURE VENTRICULAR CONTRACTIONS): ICD-10-CM

## 2024-04-09 DIAGNOSIS — E11.9 TYPE 2 DIABETES MELLITUS WITHOUT COMPLICATION, WITHOUT LONG-TERM CURRENT USE OF INSULIN (HCC): ICD-10-CM

## 2024-04-09 DIAGNOSIS — E78.2 MIXED HYPERLIPIDEMIA: ICD-10-CM

## 2024-04-09 PROCEDURE — 99244 OFF/OP CNSLTJ NEW/EST MOD 40: CPT | Performed by: INTERNAL MEDICINE

## 2024-04-09 RX ORDER — ATORVASTATIN CALCIUM 40 MG/1
40 TABLET, FILM COATED ORAL DAILY
Qty: 90 TABLET | Refills: 3 | Status: SHIPPED | OUTPATIENT
Start: 2024-04-09

## 2024-04-09 NOTE — PROGRESS NOTES
Cardiology Consultation     Judith Velazquez  [id number]  1970  [facility] [place] [address] [address]  [address] [id number] [phone]    1. Intermittent palpitations  Stress test only, exercise    atorvastatin (LIPITOR) 40 mg tablet      2. PVC's (premature ventricular contractions)  Stress test only, exercise    atorvastatin (LIPITOR) 40 mg tablet      3. Type 2 diabetes mellitus without complication, without long-term current use of insulin ([facility])        4. Mixed hyperlipidemia            Discussion/Summary:    Intermittent palpitations and PVCs - Alayna is describing classic PVCs and she wore an extended monitor in September that did show 8000 PVCs per day.  Her palpitations have improved a little bit on Toprol-XL and magnesium and we discussed changing therapy.  She did not want to go up on the Toprol-XL as of yet and I told her this was fine.  We will increase magnesium supplementation.  Also cutting back on caffeine/alcohol could help these as well.  Regular physical/cardiovascular exercise could help as well.  Order an exercise treadmill test to see the burden of PVCs with exercise, as they should be suppressed at peak stress.    Mixed hyperlipidemia - Her most recent LDL is 171.  Given her diabetes mellitus she does meet criteria for statin therapy.  I recommended starting atorvastatin and she was fine with trying this.  We will continue to follow blood work closely.  She will be back to see us in 6 months.    HPI:    Mrs. Velazquez comes in for a consultation and second opinion to discuss her intermittent palpitations that have been bothering her over the last year.  Alayna was having intermittent palpitations after having carpal tunnel surgery last year and she eventually sought consultation with cardiology at [facility] in September.  There she had an echocardiogram and a 1 week extended ambulatory  Holter.  Her echocardiogram showed normal LV systolic function with no significant valve disease.  Her extended ambulatory Holter did show a 7% burden of PVCs, averaging near 8000/day.  She also had infrequent PACs with 2 short runs of nonsustained atrial tachycardia.  Since then she was started on Toprol-XL 25 mg daily but also tried magnesium supplementation.  She noticed that with magnesium supplementation her palpitations did improve but are still present today.  She is also treated for type 2 diabetes mellitus and does have uncontrolled mixed hyperlipidemia.    Alayna describes classic intermittent PVCs.  She gets an intermittent flutter, particularly occurring at rest, and she will feel a skipped beat followed by a vigorous beat.  In many cases this will induce a reactive cough which is common.  These will occur randomly and not usually with exertion.  She works 2 jobs at 2 different [employer] companies and is busy, living a stressful life at times otherwise.  She does drink coffee in the morning and she will intermittently drink alcohol, but no more than 1 per sitting.  She does not notice any increasing palpitations with caffeine or alcohol.  She denies any other drug abuse.  She denies chest pain or any symptoms of angina.  No shortness of breath or any activity intolerance.  She denies lightheadedness or syncope.  No signs or symptoms of CHF.  With her stress she did gandhi a lot of anxiety, and was tried on SSRI therapy.  But she noticed at varying doses and on this medication there were no changes in her palpitations.      Patient Active Problem List   Diagnosis    Major depressive disorder, recurrent episode, moderate (HCC)    Attention deficit hyperactivity disorder, combined type    Depression    Type 2 diabetes mellitus, without long-term current use of insulin (HCC)    Mixed hyperlipidemia     Past Medical History:   Diagnosis Date    Carpal tunnel syndrome     Depression     Diabetes 1.5, managed  as type 2 (HCC)      Social History     Socioeconomic History    Marital status: /Civil Union     Spouse name: Not on file    Number of children: Not on file    Years of education: Not on file    Highest education level: Not on file   Occupational History    Not on file   Tobacco Use    Smoking status: Never    Smokeless tobacco: Never   Vaping Use    Vaping status: Never Used   Substance and Sexual Activity    Alcohol use: Yes    Drug use: Never    Sexual activity: Yes     Partners: Male     Birth control/protection: Male Sterilization, Female Sterilization     Comment: Tubal   Other Topics Concern    Not on file   Social History Narrative    Not on file     Social Determinants of Health     Financial Resource Strain: Not on file   Food Insecurity: Not on file   Transportation Needs: Not on file   Physical Activity: Not on file   Stress: Not on file   Social Connections: Not on file   Intimate Partner Violence: Not on file   Housing Stability: Not on file      Family History   Problem Relation Age of Onset    Diabetes Mother     Diabetes Father     ALS Father 76    Heart attack Father 50     Past Surgical History:   Procedure Laterality Date     SECTION      , ,     HERNIA REPAIR Left     age 6    HERNIA REPAIR Right     age 5    TUBAL LIGATION             Current Outpatient Medications:     Ascorbic Acid (VITAMIN C PO), Take 1,000 mg by mouth, Disp: , Rfl:     atorvastatin (LIPITOR) 40 mg tablet, Take 1 tablet (40 mg total) by mouth daily, Disp: 90 tablet, Rfl: 3    B Complex CAPS, Take by mouth, Disp: , Rfl:     Blood Glucose Monitoring Suppl (Contour Next One) JOHN, Inject 1 each under the skin daily, Disp: 1 each, Rfl: 0    Contour Next Test test strip, Use 1 each daily Test as directed- 1x daily, Disp: 100 each, Rfl: 1    MAGNESIUM GLYCINATE PO, 120 mg, Disp: , Rfl:     metFORMIN (GLUCOPHAGE-XR) 500 mg 24 hr tablet, Take 3 tablets (1,500 mg total) by mouth daily with dinner  "1500mg at dinner, Disp: 400 tablet, Rfl: 1    metoprolol succinate (TOPROL-XL) 25 mg 24 hr tablet, Take 25 mg by mouth daily, Disp: , Rfl:     Microlet Lancets MISC, Inject under the skin in the morning Test BG 1x daily, Disp: 100 each, Rfl: 1    NON FORMULARY, Take 75 mg by mouth 2 (two) times a day Taurate, Disp: , Rfl:     Omega-3 Fatty Acids (OMEGA-3 PO), Take 3,000 mg by mouth in the morning, Disp: , Rfl:     sertraline (Zoloft) 25 mg tablet, Take 1 tablet (25 mg total) by mouth daily, Disp: 30 tablet, Rfl: 2    TURMERIC PO, Take 500 mg by mouth, Disp: , Rfl:     Vitamin D-Vitamin K (VITAMIN K2-VITAMIN D3 PO), Take by mouth D3 5000 IU K2 100 MCG, Disp: , Rfl:   No Known Allergies  Vitals:    04/09/24 0846   BP: 118/68   BP Location: Left arm   Patient Position: Sitting   Cuff Size: Large   Pulse: 64   SpO2: 98%   Weight: 110 kg (242 lb)   Height: 5' 5.5\" (1.664 m)       Labs:  Lab Results   Component Value Date    K 5.6 (H) 04/02/2024     04/02/2024    CO2 25 04/02/2024    BUN 14 04/02/2024    CREATININE 0.65 04/02/2024     No results found for: \"WBC\", \"HGB\", \"HCT\", \"MCV\", \"PLT\"  Lab Results   Component Value Date    TRIG 210 (H) 04/02/2024    HDL 46 04/02/2024     Imaging:   Cardiology testing reviewed from her prior cardiologist.  As stated she had an extended ambulatory monitor for 7 days that showed frequent PVCs, averaging 8000/day with a total burden of 7%.  Echocardiogram showing normal LV systolic function without significant valve disease.    Review of Systems:  Review of Systems   Constitutional: Negative.    HENT: Negative.     Eyes: Negative.    Respiratory: Negative.     Cardiovascular:  Positive for palpitations.   Gastrointestinal: Negative.    Musculoskeletal: Negative.    Skin: Negative.    Allergic/Immunologic: Negative.    Neurological: Negative.    Hematological: Negative.    Psychiatric/Behavioral: Negative.     All other systems reviewed and are negative.    Vitals:    04/09/24 0846 " "  BP: 118/68   BP Location: Left arm   Patient Position: Sitting   Cuff Size: Large   Pulse: 64   SpO2: 98%   Weight: 110 kg (242 lb)   Height: 5' 5.5\" (1.664 m)     Physical Exam  Vitals and nursing note reviewed.   Constitutional:       Appearance: She is well-developed.   HENT:      Head: Normocephalic and atraumatic.   Eyes:      General: No scleral icterus.        Right eye: No discharge.         Left eye: No discharge.      Pupils: Pupils are equal, round, and reactive to light.   Neck:      Thyroid: No thyromegaly.      Vascular: No JVD.   Cardiovascular:      Rate and Rhythm: Normal rate and regular rhythm. No extrasystoles are present.     Pulses: Normal pulses. No decreased pulses.      Heart sounds: Normal heart sounds, S1 normal and S2 normal. No murmur heard.     No friction rub. No gallop.   Pulmonary:      Effort: Pulmonary effort is normal. No respiratory distress.      Breath sounds: Normal breath sounds. No wheezing, rhonchi or rales.   Abdominal:      General: Bowel sounds are normal. There is no distension.      Palpations: Abdomen is soft.      Tenderness: There is no abdominal tenderness.   Musculoskeletal:         General: No tenderness or deformity. Normal range of motion.      Cervical back: Normal range of motion and neck supple.      Right lower leg: No edema.      Left lower leg: No edema.   Skin:     General: Skin is warm and dry.      Findings: No rash.   Neurological:      Mental Status: She is alert and oriented to person, place, and time.      Cranial Nerves: No cranial nerve deficit.   Psychiatric:         Thought Content: Thought content normal.         Judgment: Judgment normal.       Counseling / Coordination of Care  Total office time spent today 40 minutes.  Greater than 50% of total time was spent with the patient and / or family counseling and / or coordination of care.    " I saw this patient with a scribe who wrote the documentation for me. KI Belle

## 2024-04-17 ENCOUNTER — TELEMEDICINE (OUTPATIENT)
Dept: BEHAVIORAL/MENTAL HEALTH CLINIC | Facility: CLINIC | Age: 54
End: 2024-04-17
Payer: COMMERCIAL

## 2024-04-17 DIAGNOSIS — F41.1 GENERALIZED ANXIETY DISORDER: Primary | ICD-10-CM

## 2024-04-17 PROCEDURE — 90834 PSYTX W PT 45 MINUTES: CPT | Performed by: SOCIAL WORKER

## 2024-04-17 NOTE — PSYCH
Virtual Regular Visit    Verification of patient location:    Patient is located at Home in the following state in which I hold an active license Other; [place]      Assessment/Plan:    Problem List Items Addressed This Visit    None  Visit Diagnoses       Generalized anxiety disorder    -  Primary            Goals addressed in session: Goal 1          Reason for visit is   Chief Complaint   Patient presents with    Virtual Regular Visit          Encounter provider Maria Alejandra Arteaga    Provider located at [facility] THERAPIST [facility]  [facility] THERAPIST MENTAL HEALTH OUTPATIENT  [address] [facility] [id number] [phone]      Recent Visits  No visits were found meeting these conditions.  Showing recent visits within past 7 days and meeting all other requirements  Today's Visits  Date Type Provider Dept   24 Telemedicine Maria Alejandra Arteaga [facility] Therapist [facility]   Showing today's visits and meeting all other requirements  Future Appointments  No visits were found meeting these conditions.  Showing future appointments within next 150 days and meeting all other requirements       The patient was identified by name and date of birth. Judith Velazquez was informed that this is a telemedicine visit and that the visit is being conducted throughthe Epic Embedded platform. She agrees to proceed..  My office door was closed. No one else was in the room.  She acknowledged consent and understanding of privacy and security of the video platform. The patient has agreed to participate and understands they can discontinue the visit at any time.    Patient is aware this is a billable service.     Subjective  Judith Velazquez is a 53 y.o. female .      HPI     Past Medical History:   Diagnosis Date    Carpal tunnel syndrome     Depression     Diabetes 1.5, managed as type 2 (HCC)        Past Surgical History:   Procedure Laterality Date     SECTION       2001, 2004, 2011    HERNIA REPAIR Left     age 6    HERNIA REPAIR Right     age 5    TUBAL LIGATION      2011       Current Outpatient Medications   Medication Sig Dispense Refill    Ascorbic Acid (VITAMIN C PO) Take 1,000 mg by mouth      atorvastatin (LIPITOR) 40 mg tablet Take 1 tablet (40 mg total) by mouth daily 90 tablet 3    B Complex CAPS Take by mouth      Blood Glucose Monitoring Suppl (Contour Next One) JOHN Inject 1 each under the skin daily 1 each 0    Contour Next Test test strip Use 1 each daily Test as directed- 1x daily 100 each 1    MAGNESIUM GLYCINATE  mg      metFORMIN (GLUCOPHAGE-XR) 500 mg 24 hr tablet Take 3 tablets (1,500 mg total) by mouth daily with dinner 1500mg at dinner 400 tablet 1    metoprolol succinate (TOPROL-XL) 25 mg 24 hr tablet Take 25 mg by mouth daily      Microlet Lancets MISC Inject under the skin in the morning Test BG 1x daily 100 each 1    NON FORMULARY Take 75 mg by mouth 2 (two) times a day Taurate      Omega-3 Fatty Acids (OMEGA-3 PO) Take 3,000 mg by mouth in the morning      sertraline (Zoloft) 25 mg tablet Take 1 tablet (25 mg total) by mouth daily 30 tablet 2    TURMERIC PO Take 500 mg by mouth      Vitamin D-Vitamin K (VITAMIN K2-VITAMIN D3 PO) Take by mouth D3 5000 IU  K2 100 MCG       No current facility-administered medications for this visit.        No Known Allergies    Review of Systems    Video Exam    There were no vitals filed for this visit.    Physical Exam     Behavioral Health Psychotherapy Progress Note    Psychotherapy Provided: Individual Psychotherapy     1. Generalized anxiety disorder            Goals addressed in session: Goal 1     DATA: Client presented for a telehealth session via [employer]. Client reported that she has been trying to use her phone reminders, stating that she has been scheduling appointments for herself and family. She shared that her  is working again, which has been helpful financially. She also  highlighted that they received their tax return and managed to get a decent return. She expressed that she is finally doing something for herself (utilizing some of the money they received). She talked about camping and how she had dreams to buy supplies and turn the trunk of a car into a makeshift camper. She and her  had gone out to check out pricing for the supplies. Client addressed that this is a self-care project. Client spoke around getting weaned off of Zoloft and how she feels this medication is not doing anything for her. She already talked to her psychiatrist who has been decreasing the dose. Client wanted to revisit the PHQ-9 and inquire where she is at currently. She scored a 9. Client talked about meeting with her new cardiologist and how she really likes him. She emphasized how he looked over past labs and tests and reassured her of concerns she had. Client is scheduled for a stress test tomorrow. Clinician actively listened, provided emotional support, validated client's feelings, addressed and processed current events, encouraged client to continue participating in self-care and to follow-up with doctors as they become necessary.  During this session, this clinician used the following therapeutic modalities: Client-centered Therapy, Cognitive Behavioral Therapy, and Supportive Psychotherapy    Substance Abuse was not addressed during this session. If the client is diagnosed with a co-occurring substance use disorder, please indicate any changes in the frequency or amount of use: N/A. Stage of change for addressing substance use diagnoses: No substance use/Not applicable    ASSESSMENT:  Alayna Velazquez presents with a Euthymic/ normal mood.     her affect is Normal range and intensity, which is congruent, with her mood and the content of the session. The client has made progress on their goals.     Alayna Velazquez presents with a low risk of suicide, low risk of self-harm, and low risk of  "harm to others.    For any risk assessment that surpasses a \"low\" rating, a safety plan must be developed.    A safety plan was indicated: no  If yes, describe in detail N/A    PLAN: Between sessions, Alayna Velazquez will continue to utilize coping skills to manage stress/anxiety. At the next session, the therapist will use Client-centered Therapy, Cognitive Behavioral Therapy, and Supportive Psychotherapy to address anxiety.    Behavioral Health Treatment Plan and Discharge Planning: Alayna Velazquez is aware of and agrees to continue to work on their treatment plan. They have identified and are working toward their discharge goals. yes    Visit start and stop times:    04/17/24  Start Time: 0812  Stop Time: 0900  Total Visit Time: 48 minutes        "

## 2024-04-18 ENCOUNTER — HOSPITAL ENCOUNTER (OUTPATIENT)
Dept: NON INVASIVE DIAGNOSTICS | Age: 54
Discharge: HOME | End: 2024-04-18
Payer: COMMERCIAL

## 2024-04-18 DIAGNOSIS — I49.3 PVC'S (PREMATURE VENTRICULAR CONTRACTIONS): ICD-10-CM

## 2024-04-18 DIAGNOSIS — R00.2 INTERMITTENT PALPITATIONS: ICD-10-CM

## 2024-04-18 LAB
CHEST PAIN STATEMENT: NORMAL
MAX DIASTOLIC BP: 80 MMHG
MAX HR PERCENT: 95 %
MAX HR: 162 BPM
MAX PREDICTED HEART RATE: 169 BPM
PROTOCOL NAME: NORMAL
RATE PRESSURE PRODUCT: NORMAL
REASON FOR TERMINATION: NORMAL
SL CV STRESS STAGE REACHED: 3
STRESS BASELINE HR: 76 BPM
STRESS PEAK HR: 162 BPM
STRESS POST ESTIMATED WORKLOAD: 10.1 METS
STRESS POST EXERCISE DUR MIN: 8 MIN
STRESS POST EXERCISE DUR MIN: 8 MIN
STRESS POST EXERCISE DUR SEC: 26 SEC
STRESS POST EXERCISE DUR SEC: 26 SEC
STRESS POST PEAK BP: 160 MMHG
STRESS POST PEAK HR: 162 BPM
STRESS POST PEAK SYSTOLIC BP: 160 MMHG
TARGET HR FORMULA: NORMAL
TEST INDICATION: NORMAL

## 2024-04-18 PROCEDURE — 93016 CV STRESS TEST SUPVJ ONLY: CPT | Performed by: INTERNAL MEDICINE

## 2024-04-18 PROCEDURE — 93017 CV STRESS TEST TRACING ONLY: CPT

## 2024-04-18 PROCEDURE — 93018 CV STRESS TEST I&R ONLY: CPT | Performed by: INTERNAL MEDICINE

## 2024-04-22 ENCOUNTER — TELEPHONE (OUTPATIENT)
Dept: PSYCHIATRY | Facility: CLINIC | Age: 54
End: 2024-04-22

## 2024-04-23 ENCOUNTER — OFFICE VISIT (OUTPATIENT)
Dept: DIABETES SERVICES | Facility: HOSPITAL | Age: 54
End: 2024-04-23
Payer: COMMERCIAL

## 2024-04-23 VITALS — BODY MASS INDEX: 39.66 KG/M2 | WEIGHT: 242 LBS

## 2024-04-23 DIAGNOSIS — E66.01 CLASS 3 OBESITY (HCC): ICD-10-CM

## 2024-04-23 DIAGNOSIS — F33.1 MAJOR DEPRESSIVE DISORDER, RECURRENT EPISODE, MODERATE (HCC): ICD-10-CM

## 2024-04-23 DIAGNOSIS — E78.2 MIXED HYPERLIPIDEMIA: ICD-10-CM

## 2024-04-23 DIAGNOSIS — E11.65 TYPE 2 DIABETES MELLITUS WITH HYPERGLYCEMIA, WITH LONG-TERM CURRENT USE OF INSULIN (HCC): Primary | ICD-10-CM

## 2024-04-23 DIAGNOSIS — Z79.4 TYPE 2 DIABETES MELLITUS WITH HYPERGLYCEMIA, WITH LONG-TERM CURRENT USE OF INSULIN (HCC): Primary | ICD-10-CM

## 2024-04-23 PROCEDURE — 97802 MEDICAL NUTRITION INDIV IN: CPT | Performed by: DIETITIAN, REGISTERED

## 2024-04-23 NOTE — PROGRESS NOTES
"Medical Nutrition Therapy     Assessment    Visit Type: Initial visit  Chief complaint:  Type 2 diabetes     HPI:  Met with Alayna for initial MNT.  Recent A1C improved nicely. Continue with lifestyle changes she has been doing. Food recall shows primary issues: Discussed the benefit of consistent carbohydrate intake throughout the day for steady her blood sugars.  Discussed that sometimes having breakfast sooner along with her coffee rather than 2 separate times can improve sugars throughout the morning.  Not testing blood sugars at this time.  Discussed behavior change modification strategies.  Continue with reduction of sweets.  together we discussed what foods contain CHO, reading a food label, serving sizes, and set a carb goal of 30-45g CHO/meal to promote weight loss with 15g snacks. Put together sample meals for Judith's reference and evaluated Judith's current eating plan. Good understanding, Judith will call with questions or for more education. Follow up as needed if not improving.      Ht Readings from Last 1 Encounters:   04/09/24 5' 5.5\" (1.664 m)     Wt Readings from Last 3 Encounters:   04/23/24 110 kg (242 lb)   04/09/24 110 kg (242 lb)   03/19/24 110 kg (243 lb)        Body mass index is 39.66 kg/m².     Lab Results   Component Value Date    HGBA1C 6.4 03/19/2024    HGBA1C 7.2 12/22/2023    HGBA1C 7.6 08/07/2023       No results found for: \"CHOL\"  Lab Results   Component Value Date    HDL 46 04/02/2024     Lab Results   Component Value Date    LDLCALC 171 (H) 04/02/2024     Lab Results   Component Value Date    TRIG 210 (H) 04/02/2024     Lab Results   Component Value Date    CHOLHDL 5.6 (H) 04/02/2024       Weight Change: No    Medical Diagnosis/ICD 10 Code:  E11.65    Barriers to Learning: no barriers    Do you follow any special diet presently?: No  Who shops: patient  Who cooks: patient and spouse    Food Log: Completed via the method of food recall    Breakfast:up around 6am, coffee first " while getting everyone ready. Eats 7-9am- oats and flax, likes to make from scratch. Has a seed mix that's added oats 1/2 dry made with water and then a little cream, from scratch; or 2 eggs and pasta and veggies. Pumpkin flax cereal sometimes with milk almond, hot breakfast on weekend.   Morning Snack: not much  Lunch:sometimes nothing, salad or leftovers, sushi, soups not much.   Afternoon Snack: has a sweet tooth but tries to avoid.   Dinner:5-7pm: chicken, salmon, meat starch veggie hot meal. Pasta. Potato or mac and cheese is made she will make her own thing as more whole grain 90sec.   Evening Snack: Sweets ice cream 7-9pm 1-2hr after dinner. Likes nuts, strawberries, greek yogurt with honey, cottage cheese plain  Beverages: water not enough working on it. Coffee with light cream and a little sugar, no soda, likes OJ but doesn't do it. Desmet milk at random but glass at night aldi brand unsweet, hot tea not as much sugar just a little.   Eating out/Take out: dinner not much out as a family but will order out at work  Exercise ADL, not much structured, having issues with her foot right now.     Nutrition Diagnosis:  Food and nutrition related knowledge deficit  related to Lack of prior exposure to accurate nutrition related information as evidenced by Verbalizes inaccurate or incomplete information    Intervention: plate method, label reading, behavior modification strategies, carbohydrate counting, and meal planning     Treatment Goals: Patient understands education and recommendations    Education Material Given  Judith was provided the Portion Booklet and Planning Healthy Meals     Monitoring and evaluation:    Term code indicator   1.6.3 Carbohydrate Intake Criteria: 30-45g CHO per meal, 0-15g CHO snacks    Patient’s Response to Instruction:  Comprehensiongood  Motivationgood  Expected Compliancegood    Thank you for coming to the [facility] for education today.  Please feel  free to call with any questions or concerns.    Lola Ponce, RD  [address] [id number]

## 2024-04-29 ENCOUNTER — TELEPHONE (OUTPATIENT)
Dept: BEHAVIORAL/MENTAL HEALTH CLINIC | Facility: CLINIC | Age: 54
End: 2024-04-29

## 2024-04-29 NOTE — TELEPHONE ENCOUNTER
Left voicemail informing patient and/or parent/guardian of the Psych Encounter form needing to be signed as a requirement from the insurance company for billing purposes. Patient can access form via [employer] and sign electronically.     Please make patient aware this form must be signed for each visit as a requirement to continue future visits with provider.

## 2024-05-15 ENCOUNTER — TELEPHONE (OUTPATIENT)
Dept: PSYCHIATRY | Facility: CLINIC | Age: 54
End: 2024-05-15

## 2024-05-15 NOTE — TELEPHONE ENCOUNTER
Contacted client regarding insurance ending 4/30/24. Client is working on renewing insurance and is cancelling upcoming appt with Maria Alejandra Arteaga on 5/22/24. Caller informed client that she will make Maria Alejandra aware of her situation as well.

## 2024-09-16 ENCOUNTER — LAB (OUTPATIENT)
Dept: LAB | Facility: HOSPITAL | Age: 54
End: 2024-09-16
Payer: COMMERCIAL

## 2024-09-16 DIAGNOSIS — F33.1 MAJOR DEPRESSIVE DISORDER, RECURRENT EPISODE, MODERATE (HCC): ICD-10-CM

## 2024-09-16 DIAGNOSIS — Z79.4 TYPE 2 DIABETES MELLITUS WITH HYPERGLYCEMIA, WITH LONG-TERM CURRENT USE OF INSULIN (HCC): ICD-10-CM

## 2024-09-16 DIAGNOSIS — E78.2 MIXED HYPERLIPIDEMIA: ICD-10-CM

## 2024-09-16 DIAGNOSIS — E11.65 TYPE 2 DIABETES MELLITUS WITH HYPERGLYCEMIA, WITH LONG-TERM CURRENT USE OF INSULIN (HCC): ICD-10-CM

## 2024-09-16 DIAGNOSIS — Z79.4 ENCOUNTER FOR LONG-TERM (CURRENT) USE OF INSULIN (HCC): ICD-10-CM

## 2024-09-16 DIAGNOSIS — E11.65 INADEQUATELY CONTROLLED DIABETES MELLITUS (HCC): ICD-10-CM

## 2024-09-16 DIAGNOSIS — E66.01 MORBID OBESITY (HCC): ICD-10-CM

## 2024-09-16 DIAGNOSIS — E66.01 CLASS 3 OBESITY: ICD-10-CM

## 2024-09-16 LAB
ALBUMIN SERPL BCG-MCNC: 4.1 G/DL (ref 3.5–5)
ALP SERPL-CCNC: 98 U/L (ref 34–104)
ALT SERPL W P-5'-P-CCNC: 28 U/L (ref 7–52)
ANION GAP SERPL CALCULATED.3IONS-SCNC: 6 MMOL/L (ref 4–13)
AST SERPL W P-5'-P-CCNC: 17 U/L (ref 13–39)
BILIRUB SERPL-MCNC: 0.53 MG/DL (ref 0.2–1)
BUN SERPL-MCNC: 14 MG/DL (ref 5–25)
CALCIUM SERPL-MCNC: 9.5 MG/DL (ref 8.4–10.2)
CHLORIDE SERPL-SCNC: 104 MMOL/L (ref 96–108)
CHOLEST SERPL-MCNC: 180 MG/DL
CO2 SERPL-SCNC: 30 MMOL/L (ref 21–32)
CREAT SERPL-MCNC: 0.57 MG/DL (ref 0.6–1.3)
CREAT UR-MCNC: 37.9 MG/DL
EST. AVERAGE GLUCOSE BLD GHB EST-MCNC: 171 MG/DL
GFR SERPL CREATININE-BSD FRML MDRD: 107 ML/MIN/1.73SQ M
GLUCOSE P FAST SERPL-MCNC: 121 MG/DL (ref 65–99)
HBA1C MFR BLD: 7.6 %
HDLC SERPL-MCNC: 48 MG/DL
LDLC SERPL CALC-MCNC: 106 MG/DL (ref 0–100)
MICROALBUMIN UR-MCNC: <7 MG/L
NONHDLC SERPL-MCNC: 132 MG/DL
POTASSIUM SERPL-SCNC: 4.9 MMOL/L (ref 3.5–5.3)
PROT SERPL-MCNC: 6.8 G/DL (ref 6.4–8.4)
SODIUM SERPL-SCNC: 140 MMOL/L (ref 135–147)
TRIGL SERPL-MCNC: 130 MG/DL

## 2024-09-16 PROCEDURE — 82570 ASSAY OF URINE CREATININE: CPT

## 2024-09-16 PROCEDURE — 83036 HEMOGLOBIN GLYCOSYLATED A1C: CPT

## 2024-09-16 PROCEDURE — 80061 LIPID PANEL: CPT

## 2024-09-16 PROCEDURE — 80053 COMPREHEN METABOLIC PANEL: CPT

## 2024-09-16 PROCEDURE — 36415 COLL VENOUS BLD VENIPUNCTURE: CPT

## 2024-09-16 PROCEDURE — 82043 UR ALBUMIN QUANTITATIVE: CPT

## 2024-09-17 ENCOUNTER — OFFICE VISIT (OUTPATIENT)
Dept: ENDOCRINOLOGY | Facility: HOSPITAL | Age: 54
End: 2024-09-17
Payer: COMMERCIAL

## 2024-09-17 ENCOUNTER — TELEPHONE (OUTPATIENT)
Age: 54
End: 2024-09-17

## 2024-09-17 VITALS
HEART RATE: 71 BPM | DIASTOLIC BLOOD PRESSURE: 70 MMHG | WEIGHT: 243 LBS | BODY MASS INDEX: 39.05 KG/M2 | SYSTOLIC BLOOD PRESSURE: 104 MMHG | HEIGHT: 66 IN

## 2024-09-17 DIAGNOSIS — Z79.4 TYPE 2 DIABETES MELLITUS WITH HYPERGLYCEMIA, WITH LONG-TERM CURRENT USE OF INSULIN (HCC): Primary | ICD-10-CM

## 2024-09-17 DIAGNOSIS — E11.65 TYPE 2 DIABETES MELLITUS WITH HYPERGLYCEMIA, WITH LONG-TERM CURRENT USE OF INSULIN (HCC): Primary | ICD-10-CM

## 2024-09-17 DIAGNOSIS — E78.2 MIXED HYPERLIPIDEMIA: ICD-10-CM

## 2024-09-17 DIAGNOSIS — E66.01 CLASS 3 OBESITY: ICD-10-CM

## 2024-09-17 PROCEDURE — 99214 OFFICE O/P EST MOD 30 MIN: CPT | Performed by: NURSE PRACTITIONER

## 2024-09-17 NOTE — PATIENT INSTRUCTIONS
Be mindful of diet.    Stay active and stay hydrated.    Decrease Metformin to 500 mg twice daily.    Start Ozempic 0.25 mg  weekly for 4 weeks then increase to 0.5 mg weekly.    Contact the office with any side effects or concerns.    Continue Atorvastatin.

## 2024-09-17 NOTE — PROGRESS NOTES
Judith Velazquez 53 y.o. female MRN: [id number]    Encounter: [id number]      Assessment & Plan     Assessment:  This is a 53 y.o.-year-old female with type 2 diabetes.    Plan:  1) T2DM: Recent hemoglobin A1c is elevated to 7.6.  No blood sugars available for review.  After some discussion, we will decrease her metformin XR to 500 mg twice daily.  Her BMI is 39.82.  Discussed the benefits, risks and side effects of Ozempic.  Will start Ozempic 0.25 mg weekly for 4 weeks and then increase to 0.5 mg weekly.  She will contact the office with any side effects or concerns.  In 3 to 4 weeks she will provide blood sugars checked twice daily at alternating times.  Hemoglobin A1c prior to next visit.     2) Hypertension: She is normotensive in the office today.  Continue metoprolol.  Check comprehensive metabolic panel prior to next visit.     3) Hyperlipidemia: Improved.  Continue atorvastatin.    CC: Type 2 Diabetes follow up    History of Present Illness     HPI:  53 y.o. female with a history of type 2 diabetes Without any reported complications with other PMHx of htn, hlp, class 3 obesity, MDD, MARI, ASHLEY who presents today for follow-up.  She was diagnosed with type 2 diabetes in December 2023.  She does have a history of gestational diabetes with her third son however, this was managed with lifestyle changes only.  She is currently treated with metformin 1500 mg daily at dinner.  She does complain of some GERD like symptoms at times in the evenings.  Recent hemoglobin A1c 7.6.  She denies any polyuria, polydipsia or polyphasia.  He denies any recent episodes of hypoglycemia.  Diabetic eye exams and diabetic foot exams are up-to-date at this time.   also has insulin-dependent diabetes.    For her hypertension, she is treated with metoprolol.  Her hyperlipidemia is treated with atorvastatin.      Review of Systems   Constitutional: Negative.  Negative for chills, fatigue and fever.   HENT: Negative.  Negative  for trouble swallowing and voice change.    Eyes: Negative.  Negative for photophobia, pain, discharge, redness, itching and visual disturbance.   Respiratory: Negative.  Negative for chest tightness and shortness of breath.    Cardiovascular: Negative.  Negative for chest pain.   Gastrointestinal:  Positive for abdominal pain (GERD at times). Negative for constipation, diarrhea and vomiting.   Endocrine: Negative for cold intolerance, heat intolerance, polydipsia, polyphagia and polyuria.   Genitourinary: Negative.    Musculoskeletal: Negative.    Skin: Negative.    Allergic/Immunologic: Negative.    Neurological: Negative.  Negative for dizziness, syncope, light-headedness and headaches.   Hematological: Negative.    Psychiatric/Behavioral: Negative.     All other systems reviewed and are negative.      Historical Information   Past Medical History:   Diagnosis Date    Carpal tunnel syndrome     Depression     Diabetes 1.5, managed as type 2 (HCC)      Past Surgical History:   Procedure Laterality Date     SECTION      , ,     HERNIA REPAIR Left     age 6    HERNIA REPAIR Right     age 5    TUBAL LIGATION           Social History   Social History     Substance and Sexual Activity   Alcohol Use Yes     Social History     Substance and Sexual Activity   Drug Use Never     Social History     Tobacco Use   Smoking Status Never   Smokeless Tobacco Never     Family History:   Family History   Problem Relation Age of Onset    Diabetes Mother     Diabetes Father     ALS Father 76    Heart attack Father 50       Meds/Allergies   Current Outpatient Medications   Medication Sig Dispense Refill    Ascorbic Acid (VITAMIN C PO) Take 1,000 mg by mouth      atorvastatin (LIPITOR) 40 mg tablet Take 1 tablet (40 mg total) by mouth daily 90 tablet 3    B Complex CAPS Take by mouth      Blood Glucose Monitoring Suppl (Contour Next One) JOHN Inject 1 each under the skin daily 1 each 0    Contour Next Test test  "strip Use 1 each daily Test as directed- 1x daily 100 each 1    MAGNESIUM GLYCINATE  mg      metFORMIN (GLUCOPHAGE-XR) 500 mg 24 hr tablet Take 3 tablets (1,500 mg total) by mouth daily with dinner 1500mg at dinner 400 tablet 1    metoprolol succinate (TOPROL-XL) 25 mg 24 hr tablet Take 25 mg by mouth daily      Microlet Lancets MISC Inject under the skin in the morning Test BG 1x daily 100 each 1    Omega-3 Fatty Acids (OMEGA-3 PO) Take 3,000 mg by mouth in the morning      TURMERIC PO Take 500 mg by mouth      Vitamin D-Vitamin K (VITAMIN K2-VITAMIN D3 PO) Take by mouth D3 5000 IU  K2 100 MCG       No current facility-administered medications for this visit.     No Known Allergies    Objective   Vitals: Blood pressure 104/70, pulse 71, height 5' 5.5\" (1.664 m), weight 110 kg (243 lb).    Physical Exam  Vitals reviewed.   Constitutional:       Appearance: She is well-developed. She is obese.   HENT:      Head: Normocephalic and atraumatic.   Eyes:      Conjunctiva/sclera: Conjunctivae normal.      Pupils: Pupils are equal, round, and reactive to light.      Comments: Wears glasses   Cardiovascular:      Rate and Rhythm: Normal rate and regular rhythm.      Heart sounds: Normal heart sounds.   Pulmonary:      Effort: Pulmonary effort is normal.      Breath sounds: Normal breath sounds.   Abdominal:      General: Bowel sounds are normal.      Palpations: Abdomen is soft.   Musculoskeletal:         General: Normal range of motion.      Cervical back: Normal range of motion and neck supple.   Skin:     General: Skin is warm and dry.   Neurological:      Mental Status: She is alert and oriented to person, place, and time.   Psychiatric:         Behavior: Behavior normal.         Thought Content: Thought content normal.         Judgment: Judgment normal.       Lab Results:   Lab Results   Component Value Date/Time    Hemoglobin A1C 7.6 (H) 09/16/2024 12:12 PM    Hemoglobin A1C 6.4 03/19/2024 10:03 AM    Hemoglobin " "A1C 7.2 12/22/2023 12:00 AM    BUN 14 09/16/2024 12:12 PM    BUN 14 04/02/2024 08:26 AM    Potassium 4.9 09/16/2024 12:12 PM    Potassium 5.6 (H) 04/02/2024 08:26 AM    Chloride 104 09/16/2024 12:12 PM    Chloride 102 04/02/2024 08:26 AM    CO2 30 09/16/2024 12:12 PM    CO2 25 04/02/2024 08:26 AM    Creatinine 0.57 (L) 09/16/2024 12:12 PM    Creatinine 0.65 04/02/2024 08:26 AM    AST 17 09/16/2024 12:12 PM    AST 12 04/02/2024 08:26 AM    ALT 28 09/16/2024 12:12 PM    ALT 17 04/02/2024 08:26 AM    Total Protein 6.8 09/16/2024 12:12 PM    Protein, Total 6.7 04/02/2024 08:26 AM    Albumin 4.1 09/16/2024 12:12 PM    Albumin 4.2 04/02/2024 08:26 AM    Globulin, Total 2.5 04/02/2024 08:26 AM    HDL 46 04/02/2024 08:26 AM    HDL, Direct 48 (L) 09/16/2024 12:12 PM    Triglycerides 130 09/16/2024 12:12 PM    Triglycerides 210 (H) 04/02/2024 08:26 AM       Portions of the record may have been created with voice recognition software. Occasional wrong word or \"sound a like\" substitutions may have occurred due to the inherent limitations of voice recognition software. Read the chart carefully and recognize, using context, where substitutions have occurred.    "

## 2024-09-18 NOTE — TELEPHONE ENCOUNTER
PA for semaglutide, 0.25 or 0.5 mg/dose, (Ozempic, 0.25 or 0.5 MG/DOSE,) 2 mg/3 mL injection pen SUBMITTED     via    [x]CMM-KEY: [id number]  []Surescripts-Case ID #    []Faxed to plan   []Other website    []Phone call Case ID #      Office notes sent, clinical questions answered. Awaiting determination    Turnaround time for your insurance to make a decision on your Prior Authorization can take 7-21 business days.

## 2024-09-19 NOTE — TELEPHONE ENCOUNTER
PA for  semaglutide, 0.25 or 0.5 mg/dose, (Ozempic, 0.25 or 0.5 MG/DOSE,) 2 mg/3 mL injection pen  APPROVED     Date(s) approved 09/18/24-09/18/25 [employer]    Case #    Patient advised by          []MyChart Message  []Phone call   [x]LMOM  []L/M to call office as no active Communication consent on file  []Unable to leave detailed message as VM not approved on Communication consent       Pharmacy advised by    [x]Fax  []Phone call    Approval letter scanned into Media Yes

## 2024-09-24 ENCOUNTER — DOCUMENTATION (OUTPATIENT)
Dept: BEHAVIORAL/MENTAL HEALTH CLINIC | Facility: CLINIC | Age: 54
End: 2024-09-24

## 2024-09-24 NOTE — PROGRESS NOTES
Psychotherapy Discharge Summary    Preferred Name: Alayna Velazquez  YOB: 1970    Admission date to psychotherapy: 9/2022    Referred by: Self    Presenting Problem: Anxiety, depression    Course of treatment included : medication management and individual therapy     Progress/Outcome of Treatment Goals (brief summary of course of treatment) Managing symptoms of anxiety and depression    Treatment Complications (if any): N/A    Treatment Progress: fair    Current [facility] Psychiatric Provider: Gaurang Cason Medications include: N/A    Discharge Date: 9/24/2024    Discharge Diagnosis: No diagnosis found.    Criteria for Discharge: demonstrated failure to uphold their treatment plan/contract    Aftercare recommendations include (include specific referral names and phone numbers, if appropriate): Return to OP if situation changes    Prognosis: fair

## 2024-10-01 ENCOUNTER — OFFICE VISIT (OUTPATIENT)
Dept: CARDIOLOGY CLINIC | Facility: CLINIC | Age: 54
End: 2024-10-01
Payer: COMMERCIAL

## 2024-10-01 VITALS
WEIGHT: 236 LBS | HEIGHT: 66 IN | SYSTOLIC BLOOD PRESSURE: 134 MMHG | HEART RATE: 70 BPM | DIASTOLIC BLOOD PRESSURE: 88 MMHG | BODY MASS INDEX: 37.93 KG/M2

## 2024-10-01 DIAGNOSIS — I49.3 PVC'S (PREMATURE VENTRICULAR CONTRACTIONS): Primary | ICD-10-CM

## 2024-10-01 PROCEDURE — 99214 OFFICE O/P EST MOD 30 MIN: CPT | Performed by: PHYSICIAN ASSISTANT

## 2024-10-01 NOTE — PROGRESS NOTES
"Cardiology Office Follow Up  Judith Velazquez  1970  [id number]      ASSESSMENT:  History of PVCs  10/2023 extended ambulatory monitor showing 7% PVC burden  4/2024 treadmill stress test: Normal exercise treadmill stress test.  Occasional ventricular couplets during recovery  AV blocking Rx: Toprol-XL 25 daily  Hyperlipidemia   in September 2024  Current Rx: Lipitor 40 daily  Type 2 diabetes  Family history of atrial fibrillation and coronary disease    PLAN/ DISCUSSION:  History of PVCs  With addition of metoprolol and magnesium supplements she feels that her burden of PVCs have decreased and symptoms are now well-controlled  Continue magnesium supplements and Toprol-XL 25 daily  Hyperlipidemia  LDL significantly improved with addition of Lipitor and is 106  Given her diabetes and family history of premature CAD goal will be <100  We discussed diet and exercise to help get LDL less than 100    Interval History/ HPI:   53-year-old female coming in for 6-month follow-up visit.  She reports that since her last visit she has been doing well.  Her palpitations have decreased significantly.  She feels that they are well-controlled.  Her breathing is stable.  She works at 2 different [employer] and also [employer] and notes that she does long shifts and does not have any symptoms of chest pain or chest pressure.  She has not had any episodes of passing out.  She recently started Ozempic per her endocrinologist.     Vitals:  /88 (BP Location: Left arm, Patient Position: Sitting, Cuff Size: Standard)   Pulse 70   Ht 5' 6\" (1.676 m)   Wt 107 kg (236 lb)   BMI 38.09 kg/m²      Past Medical History:   Diagnosis Date    Carpal tunnel syndrome     Depression     Diabetes 1.5, managed as type 2 (HCC)      Social History     Socioeconomic History    Marital status: /Civil Union     Spouse name: Not on file    Number of children: Not on file    Years of education: Not on file    Highest education " level: Not on file   Occupational History    Not on file   Tobacco Use    Smoking status: Never    Smokeless tobacco: Never   Vaping Use    Vaping status: Never Used   Substance and Sexual Activity    Alcohol use: Yes    Drug use: Never    Sexual activity: Yes     Partners: Male     Birth control/protection: Male Sterilization, Female Sterilization     Comment: Tubal   Other Topics Concern    Not on file   Social History Narrative    Not on file     Social Determinants of Health     Financial Resource Strain: Not on file   Food Insecurity: Not on file   Transportation Needs: Not on file   Physical Activity: Not on file   Stress: Not on file   Social Connections: Not on file   Intimate Partner Violence: Not on file   Housing Stability: Not on file      Family History   Problem Relation Age of Onset    Diabetes Mother     Diabetes Father     ALS Father 76    Heart attack Father 50     Past Surgical History:   Procedure Laterality Date     SECTION      , ,     HERNIA REPAIR Left     age 6    HERNIA REPAIR Right     age 5    TUBAL LIGATION             Current Outpatient Medications:     Ascorbic Acid (VITAMIN C PO), Take 1,000 mg by mouth, Disp: , Rfl:     atorvastatin (LIPITOR) 40 mg tablet, Take 1 tablet (40 mg total) by mouth daily, Disp: 90 tablet, Rfl: 3    B Complex CAPS, Take by mouth, Disp: , Rfl:     Blood Glucose Monitoring Suppl (Contour Next One) JOHN, Inject 1 each under the skin daily, Disp: 1 each, Rfl: 0    Contour Next Test test strip, Use 1 each daily Test as directed- 1x daily, Disp: 100 each, Rfl: 1    MAGNESIUM GLYCINATE PO, 120 mg, Disp: , Rfl:     metFORMIN (GLUCOPHAGE-XR) 500 mg 24 hr tablet, Take 3 tablets (1,500 mg total) by mouth daily with dinner 1500mg at dinner, Disp: 400 tablet, Rfl: 1    metoprolol succinate (TOPROL-XL) 25 mg 24 hr tablet, Take 25 mg by mouth daily, Disp: , Rfl:     Microlet Lancets MISC, Inject under the skin in the morning Test BG 1x daily, Disp:  100 each, Rfl: 1    Omega-3 Fatty Acids (OMEGA-3 PO), Take 3,000 mg by mouth in the morning, Disp: , Rfl:     semaglutide, 0.25 or 0.5 mg/dose, (Ozempic, 0.25 or 0.5 MG/DOSE,) 2 mg/3 mL injection pen, Inject 0.25 mg weekly for 4 weeks then increase to 0.5 mg weekly., Disp: 3 mL, Rfl: 6    TURMERIC PO, Take 500 mg by mouth, Disp: , Rfl:     Vitamin D-Vitamin K (VITAMIN K2-VITAMIN D3 PO), Take by mouth D3 5000 IU K2 100 MCG, Disp: , Rfl:       Review of Systems:  Review of Systems   Constitutional:  Negative for chills and fever.   HENT:  Negative for ear pain and sore throat.    Eyes:  Negative for pain and visual disturbance.   Respiratory:  Negative for cough and shortness of breath.    Cardiovascular:  Negative for chest pain and palpitations.   Gastrointestinal:  Negative for abdominal pain and vomiting.   Genitourinary:  Negative for dysuria and hematuria.   Musculoskeletal:  Negative for arthralgias and back pain.   Skin:  Negative for color change and rash.   Neurological:  Negative for seizures and syncope.   All other systems reviewed and are negative.        Physical Exam:  Physical Exam  Constitutional:       General: She is not in acute distress.     Appearance: Normal appearance. She is not ill-appearing.   HENT:      Head: Normocephalic and atraumatic.      Right Ear: External ear normal.      Left Ear: External ear normal.      Mouth/Throat:      Pharynx: No oropharyngeal exudate or posterior oropharyngeal erythema.   Eyes:      General:         Right eye: No discharge.         Left eye: No discharge.      Conjunctiva/sclera: Conjunctivae normal.      Pupils: Pupils are equal, round, and reactive to light.   Cardiovascular:      Rate and Rhythm: Normal rate and regular rhythm.      Pulses: Normal pulses.      Heart sounds: Normal heart sounds. No murmur heard.     No friction rub. No gallop.   Pulmonary:      Effort: Pulmonary effort is normal.      Breath sounds: Normal breath sounds. No wheezing,  rhonchi or rales.   Abdominal:      General: Abdomen is flat. There is no distension.      Palpations: Abdomen is soft.      Tenderness: There is no abdominal tenderness.   Musculoskeletal:         General: No swelling, tenderness or deformity. Normal range of motion.      Cervical back: Normal range of motion.   Skin:     General: Skin is warm and dry.   Neurological:      General: No focal deficit present.      Mental Status: She is alert and oriented to person, place, and time.         This note was completed in part utilizing M-Modal Fluency Direct Software.  Grammatical errors, random word insertions, spelling mistakes, and incomplete sentences can be an occasional consequence of this system secondary to software limitations, ambient noise, and hardware issues.  If you have any questions or concerns about the content, text, or information contained within the body of this dictation, please contact the provider for clarification.

## 2024-10-03 LAB
LEFT EYE DIABETIC RETINOPATHY: NORMAL
RIGHT EYE DIABETIC RETINOPATHY: NORMAL

## 2024-10-13 DIAGNOSIS — E78.2 MIXED HYPERLIPIDEMIA: ICD-10-CM

## 2024-10-13 DIAGNOSIS — Z79.4 TYPE 2 DIABETES MELLITUS WITH HYPERGLYCEMIA, WITH LONG-TERM CURRENT USE OF INSULIN (HCC): ICD-10-CM

## 2024-10-13 DIAGNOSIS — E11.65 TYPE 2 DIABETES MELLITUS WITH HYPERGLYCEMIA, WITH LONG-TERM CURRENT USE OF INSULIN (HCC): ICD-10-CM

## 2024-10-13 DIAGNOSIS — F33.1 MAJOR DEPRESSIVE DISORDER, RECURRENT EPISODE, MODERATE (HCC): ICD-10-CM

## 2024-10-13 DIAGNOSIS — E66.813 CLASS 3 OBESITY: ICD-10-CM

## 2024-10-14 RX ORDER — METFORMIN HYDROCHLORIDE 500 MG/1
TABLET, EXTENDED RELEASE ORAL
Qty: 360 TABLET | Refills: 1 | Status: SHIPPED | OUTPATIENT
Start: 2024-10-14

## 2024-11-04 ENCOUNTER — OFFICE VISIT (OUTPATIENT)
Dept: DERMATOLOGY | Facility: CLINIC | Age: 54
End: 2024-11-04
Payer: COMMERCIAL

## 2024-11-04 VITALS — TEMPERATURE: 97.5 F | WEIGHT: 232.6 LBS | BODY MASS INDEX: 37.54 KG/M2

## 2024-11-04 DIAGNOSIS — D22.60 MULTIPLE BENIGN MELANOCYTIC NEVI OF UPPER EXTREMITY, LOWER EXTREMITY, AND TRUNK: Primary | ICD-10-CM

## 2024-11-04 DIAGNOSIS — L81.4 LENTIGINES: ICD-10-CM

## 2024-11-04 DIAGNOSIS — D17.0 LIPOMA OF NECK: ICD-10-CM

## 2024-11-04 DIAGNOSIS — D22.5 MULTIPLE BENIGN MELANOCYTIC NEVI OF UPPER EXTREMITY, LOWER EXTREMITY, AND TRUNK: Primary | ICD-10-CM

## 2024-11-04 DIAGNOSIS — L82.1 SEBORRHEIC KERATOSES: ICD-10-CM

## 2024-11-04 DIAGNOSIS — D48.9 NEOPLASM OF UNCERTAIN BEHAVIOR: ICD-10-CM

## 2024-11-04 DIAGNOSIS — D23.9 DERMATOFIBROMA: ICD-10-CM

## 2024-11-04 DIAGNOSIS — D22.70 MULTIPLE BENIGN MELANOCYTIC NEVI OF UPPER EXTREMITY, LOWER EXTREMITY, AND TRUNK: Primary | ICD-10-CM

## 2024-11-04 DIAGNOSIS — D18.01 CHERRY ANGIOMA: ICD-10-CM

## 2024-11-04 DIAGNOSIS — L57.0 KERATOSIS, ACTINIC: ICD-10-CM

## 2024-11-04 PROCEDURE — 88305 TISSUE EXAM BY PATHOLOGIST: CPT | Performed by: DERMATOLOGY

## 2024-11-04 PROCEDURE — 99204 OFFICE O/P NEW MOD 45 MIN: CPT | Performed by: DERMATOLOGY

## 2024-11-04 PROCEDURE — 11102 TANGNTL BX SKIN SINGLE LES: CPT | Performed by: DERMATOLOGY

## 2024-11-04 RX ORDER — FLUOROURACIL 50 MG/G
CREAM TOPICAL
Qty: 40 G | Refills: 2 | Status: SHIPPED | OUTPATIENT
Start: 2024-11-04

## 2024-11-04 NOTE — PROGRESS NOTES
"[facility] Dermatology Clinic Note     Patient Name: Judith Velazquez  Encounter Date: 11/4/24     Have you been cared for by a [facility] Dermatologist in the last 3 years and, if so, which description applies to you?    NO.   I am considered a \"new\" patient and must complete all patient intake questions. I am FEMALE/of child-bearing potential.    REVIEW OF SYSTEMS:  Have you recently had or currently have any of the following? Recent fever or chills? No  Any non-healing wound? No  Are you pregnant or planning to become pregnant? No  Are you currently or planning to be nursing or breast feeding? No   PAST MEDICAL HISTORY:  Have you personally ever had or currently have any of the following?  If \"YES,\" then please provide more detail. Skin cancer (such as Melanoma, Basal Cell Carcinoma, Squamous Cell Carcinoma?  No  Tuberculosis, HIV/AIDS, Hepatitis B or C: No  Radiation Treatment No   HISTORY OF IMMUNOSUPPRESSION:   Do you have a history of any of the following:  Systemic Immunosuppression such as Diabetes, Biologic or Immunotherapy, Chemotherapy, Organ Transplantation, Bone Marrow Transplantation or Prednsione?  YES, Type II diabetes    Answering \"YES\" requires the addition of the dotphrase \"IMMUNOSUPPRESSED\" as the first diagnosis of the patient's visit.   FAMILY HISTORY:  Any \"first degree relatives\" (parent, brother, sister, or child) with the following?    Skin Cancer, Pancreatic or Other Cancer? YES, dad - BCC   PATIENT EXPERIENCE:    Do you want the Dermatologist to perform a COMPLETE skin exam today including a clinical examination under the \"bra and underwear\" areas?  Yes  If necessary, do we have your permission to call and leave a detailed message on your Preferred Phone number that includes your specific medical information?  Yes      No Known Allergies   Current Outpatient Medications:     Ascorbic Acid (VITAMIN C PO), Take 1,000 mg by mouth, Disp: , Rfl:     atorvastatin (LIPITOR) 40 mg tablet, " Take 1 tablet (40 mg total) by mouth daily, Disp: 90 tablet, Rfl: 3    B Complex CAPS, Take by mouth, Disp: , Rfl:     Blood Glucose Monitoring Suppl (Contour Next One) JOHN, Inject 1 each under the skin daily, Disp: 1 each, Rfl: 0    Contour Next Test test strip, Use 1 each daily Test as directed- 1x daily, Disp: 100 each, Rfl: 1    MAGNESIUM GLYCINATE PO, 120 mg, Disp: , Rfl:     metFORMIN (GLUCOPHAGE-XR) 500 mg 24 hr tablet, TAKE 4 TABLETS BY MOUTH EVERY DAY WITH DINNER, Disp: 360 tablet, Rfl: 1    metoprolol succinate (TOPROL-XL) 25 mg 24 hr tablet, Take 25 mg by mouth daily, Disp: , Rfl:     Microlet Lancets MISC, Inject under the skin in the morning Test BG 1x daily, Disp: 100 each, Rfl: 1    Omega-3 Fatty Acids (OMEGA-3 PO), Take 3,000 mg by mouth in the morning, Disp: , Rfl:     semaglutide, 0.25 or 0.5 mg/dose, (Ozempic, 0.25 or 0.5 MG/DOSE,) 2 mg/3 mL injection pen, Inject 0.25 mg weekly for 4 weeks then increase to 0.5 mg weekly., Disp: 3 mL, Rfl: 6    TURMERIC PO, Take 500 mg by mouth, Disp: , Rfl:     Vitamin D-Vitamin K (VITAMIN K2-VITAMIN D3 PO), Take by mouth D3 5000 IU K2 100 MCG, Disp: , Rfl:           Whom besides the patient is providing clinical information about today's encounter?   NO ADDITIONAL HISTORIAN (patient alone provided history)    Physical Exam and Assessment/Plan by Diagnosis:    SEBORRHEIC KERATOSES  - Relevant exam: Scattered over the trunk/extremities are waxy brown to black plaques and papules with stuck on appearance and consistent dermoscopy  - Exam and clinical history consistent with seborrheic keratoses  - Counseled that these are benign growths that do not require treatment    MELANOCYTIC NEVI  -Relevant exam: Scattered over the trunk/extremities and right cheek just anterior to a prior biopsy scar are homogenously pigmented brown macules and papules. ELM performed and without concerning findings. No outliers unless otherwise noted in today's note  - Exam and clinical  history consistent with melanocytic nevi  - Counseled to return to clinic prior to scheduled appointment should any of these lesions change or should any new lesions of concern arise  - Counseled on use of sun protection daily. Reviewed latest FDA sunscreen guidelines, including use of broad spectrum (UVA and UVB blocking) sunscreen or sun protective clothing with SPF 30-50 every 2-3 hours and reapplied after exposure to water    LENTIGINES  OTHER SKIN CHANGES DUE TO CHRONIC EXPOSURE TO NONIONIZING RADIATION  - Relevant exam: Over sun exposed areas are brown macules. ELM performed and without concerning findings.  - Exam and clinical history consistent with lentigines.  - Counseled to return to clinic prior to scheduled appointment should any of these lesions change or should any new lesions of concern arise.  - Recommended use of sunscreen as above and below.    CHERRY ANGIOMAS  - Relevant exam: Scattered over the trunk/extremities are red papules  - Exam and clinical history consistent with cherry angiomas  - Educated that these are benign    DERMATOFIBROMA  Physical Exam:  Anatomic Location Affected:  lower extremities  Morphological Description:  brown firm papules with + dimple sign and consistent dermoscopy  Pertinent Positives:  Pertinent Negatives:    Additional History of Present Condition:  noted on exam.    Assessment and Plan:  Based on a thorough discussion of this condition and the management approach to it (including a comprehensive discussion of the known risks, side effects and potential benefits of treatment), the patient (family) agrees to implement the following specific plan:  Reassured benign.  Follow up for changes      NEOPLASM OF UNCERTAIN BEHAVIOR OF SKIN  Physical Exam:  (Anatomic Location); (Size and Morphological Description); (Differential Diagnosis):  A: left abdomen;1.1 cm irregularly pigmented papule. Differential diagnosis: benign vs atypical nevus; rule out melanoma vs. SK  B: soft  "subcutaneous nodule on the right supraclavicular region without overlying skin change      Additional History of Present Condition:  Patient reports that this spot has recently changed in size and color on abdomen. She was not aware of the lesion near her right clavicle.    Assessment and Plan:  I have discussed with the patient that a sample of skin via a \"skin biopsy” would be potentially helpful to further make a specific diagnosis under the microscope.  Based on a thorough discussion of this condition and the management approach to it (including a comprehensive discussion of the known risks, side effects and potential benefits of treatment), the patient (family) agrees to implement the following specific plan:    A- Procedure:  Skin Biopsy.  After a thorough discussion of treatment options and risk/benefits/alternatives (including but not limited to local pain, scarring, dyspigmentation, blistering, possible superinfection, and inability to confirm a diagnosis via histopathology), verbal and written consent were obtained and portion of the rash was biopsied for tissue sample.  See below for consent that was obtained from patient and subsequent Procedure Note.   B- likely lipoma. Will order ultrasound to confirm> if consistent, patient could elect observation or excision. For any concerning findings, would recommend excision/tissue diagnosis.    PROCEDURE TANGENTIAL (SHAVE) BIOPSY NOTE:  Performing Physician:   Anatomic Location; Clinical Description with size (cm); Pre-Op Diagnosis:   A: left abdomen;1.1 cm irregularly pigmented papule. Differential diagnosis: benign vs atypical nevus; rule out melanoma vs. SK  Post-op diagnosis: Same     Local anesthesia: 1% xylocaine with epi      Topical anesthesia: None    Hemostasis: Aluminum chloride       After obtaining informed consent  at which time there was a discussion about the purpose of biopsy  and low risks of infection and bleeding.  The area was " "prepped and draped in the usual fashion. Anesthesia was obtained with 1% lidocaine with epinephrine. A shave biopsy to an appropriate sampling depth was obtained by Shave (Dermablade or 15 blade) The resulting wound was covered with surgical ointment and bandaged appropriately.     The patient tolerated the procedure well without complications and was without signs of functional compromise.      Specimen has been sent for review by Dermatopathology.    Standard post-procedure care has been explained and has been included in written form within the patient's copy of Informed Consent.    INFORMED CONSENT DISCUSSION AND POST-OPERATIVE INSTRUCTIONS FOR PATIENT    I.  RATIONALE FOR PROCEDURE  I understand that a skin biopsy allows the Dermatologist to test a lesion or rash under the microscope to obtain a diagnosis.  It usually involves numbing the area with numbing medication and removing a small piece of skin; sometimes the area will be closed with sutures. In this specific procedure, sutures are not usually needed.  If any sutures are placed, then they are usually need to be removed in 2 weeks or less.    I understand that my Dermatologist recommends that a skin \"shave\" biopsy be performed today.  A local anesthetic, similar to the kind that a dentist uses when filling a cavity, will be injected with a very small needle into the skin area to be sampled.  The injected skin and tissue underneath \"will go to sleep” and become numb so no pain should be felt afterwards.  An instrument shaped like a tiny \"razor blade\" (shave biopsy instrument) will be used to cut a small piece of tissue and skin from the area so that a sample of tissue can be taken and examined more closely under the microscope.  A slight amount of bleeding will occur, but it will be stopped with direct pressure and a pressure bandage and any other appropriate methods.  I understands that a scar will form where the wound was created.  Surgical ointment will " "be applied to help protect the wound.  Sutures are not usually needed.    II.  RISKS AND POTENTIAL COMPLICATIONS   I understand the risks and potential complications of a skin biopsy include but are not limited to the following:  Bleeding  Infection  Pain  Scar/keloid  Skin discoloration  Incomplete Removal  Recurrence  Nerve Damage/Numbness/Loss of Function  Allergic Reaction to Anesthesia  Biopsies are diagnostic procedures and based on findings additional treatment or evaluation may be required  Loss or destruction of specimen resulting in no additional findings    My Dermatologist has explained to me the nature of the condition, the nature of the procedure, and the benefits to be reasonably expected compared with alternative approaches.  My Dermatologist has discussed the likelihood of major risks or complications of this procedure including the specific risks listed above, such as bleeding, infection, and scarring/keloid.  I understand that a scar is expected after this procedure.  I understand that my physician cannot predict if the scar will form a \"keloid,\" which extends beyond the borders of the wound that is created.  A keloid is a thick, painful, and bumpy scar.  A keloid can be difficult to treat, as it does not always respond well to therapy, which includes injecting cortisone directly into the keloid every few weeks.  While this usually reduces the pain and size of the scar, it does not eliminate it.      I understand that photographs may be taken before and after the procedure.  These will be maintained as part of the medical providers confidential records and may not be made available to me.  I further authorize the medical provider to use the photographs for teaching purposes or to illustrate scientific papers, books, or lectures if in his/her judgment, medical research, education, or science may benefit from its use.    I have had an opportunity to fully inquire about the risks and benefits of this " "procedure and its alternatives.   I have been given ample time and opportunity to ask questions and to seek a second opinion if I wished to do so.  I acknowledge that there have specifically been no guarantees as to the cosmetic results from the procedure.  I am aware that with any procedure there is always the possibility of an unexpected complication.    III. POST-PROCEDURAL CARE (WHAT YOU WILL NEED TO DO \"AFTER THE BIOPSY\" TO OPTIMIZE HEALING)    Keep the area clean and dry.  Try NOT to remove the bandage or get it wet for the first 24 hours.    Gently clean the area and apply surgical ointment (such as Vaseline petrolatum ointment, which is available \"over the counter\" and not a prescription) to the biopsy site for up to 2 weeks straight.  This acts to protect the wound from the outside world.      Sutures are not usually placed in this procedure.  If any sutures were placed, return for suture removal as instructed (generally 1 week for the face, 2 weeks for the body).      Take Acetaminophen (Tylenol) for discomfort, if no contraindications.  Ibuprofen or aspirin could make bleeding worse.    Call our office immediately for signs of infection: fever, chills, increased redness, warmth, tenderness, discomfort/pain, or pus or foul smell coming from the wound.    WHAT TO DO IF THERE IS ANY BLEEDING?  If a small amount of bleeding is noticed, place a clean cloth over the area and apply firm pressure for ten minutes.  Check the wound after 10 minutes of direct pressure.  If bleeding persists, try one more time for an additional 10 minutes of direct pressure on the area.  If the bleeding becomes heavier or does not stop after the second attempt, or if you have any other questions about this procedure, then please call your [facility] Dermatologist by calling [phone] (SKIN).     I hereby acknowledge that I have reviewed and verified the site with my Dermatologist and have requested and authorized my Dermatologist " to proceed with the procedure.      ACTINIC KERATOSES  - Relevant exam: On the left cheek is a very subtle scaly pink macule without palpable dermal component    Patient notes scaly spot comes and goes on left cheek and gets less prominent when she washes face sometimes. It is near a prior biopsy scar from a mole removal in the past.    - Exam and clinical history consistent with possible very early actinic keratosis  - Discussed that these lesions are considered premalignant with the potential to evolve into squamous cell carcinoma.   - Discussed treatment options, which may inclue liquid nitrogen destruction, topical immunotherapy including risks, benefits  - Patient counseled to return to the office in 4-6 weeks after completion of treatment for recheck if not resolved at which time retreatment or biopsy to rule out SCC will be determined based on clinic findings        - The patient agreed to treatment with topical efudex 5% for 14 days BID to the area  - Common side effects for this treatment were discussed to include erythema, crusting , irritation, etc  - If site fails to resolve or recurs after this, she will contact me to consider biopsy  - Site was so subtle today I did not feel like I could be sure I was freezing the correct spot that she describes as coming and going      Scribe Attestation      I,:  Radha Perdomo MA am acting as a scribe while in the presence of the attending physician.:       I,:  Celine Dowell MD personally performed the services described in this documentation    as scribed in my presence.:

## 2024-11-08 PROCEDURE — 88305 TISSUE EXAM BY PATHOLOGIST: CPT | Performed by: DERMATOLOGY

## 2024-11-08 NOTE — RESULT ENCOUNTER NOTE
DERMATOPATHOLOGY RESULT NOTE    Results reviewed by ordering physician.  Called patient to personally discuss results. No answer, left voicemail with result.      Instructions for Clinical Derm Team:   (remember to route Result Note to appropriate staff):    None    Result & Plan by Specimen:    Specimen A: benign SK  Plan: monitor, reassured, benign, and discussed possibility of recurrence         Component   Case Report  Surgical Pathology Report                         Case: [id number]                                  Authorizing Provider:  Celine Dowell MD     Collected:           11/04/2024 1007              Ordering Location:     [facility]      Received:            11/04/2024 [address] [place] [address]                                                                Pathologist:           Celine Dowell MD                                                      Specimen:    Skin, Other, A: left abdomen                                                            Final Diagnosis  A. Skin, left abdomen, shave biopsy:  Seborrheic keratosis; extending to biopsy margins.       Electronically signed by Celine Dowell MD on 11/8/2024 at 10:27 AM

## 2024-11-25 ENCOUNTER — HOSPITAL ENCOUNTER (OUTPATIENT)
Dept: ULTRASOUND IMAGING | Facility: HOSPITAL | Age: 54
Discharge: HOME | End: 2024-11-25
Attending: DERMATOLOGY
Payer: COMMERCIAL

## 2024-11-25 DIAGNOSIS — D17.0 LIPOMA OF NECK: ICD-10-CM

## 2024-11-25 PROCEDURE — 76536 US EXAM OF HEAD AND NECK: CPT

## 2024-12-05 ENCOUNTER — RESULTS FOLLOW-UP (OUTPATIENT)
Dept: DERMATOLOGY | Facility: CLINIC | Age: 54
End: 2024-12-05

## 2024-12-05 DIAGNOSIS — L57.0 KERATOSIS, ACTINIC: ICD-10-CM

## 2024-12-05 RX ORDER — FLUOROURACIL 50 MG/G
CREAM TOPICAL
Qty: 40 G | Refills: 2 | Status: SHIPPED | OUTPATIENT
Start: 2024-12-05

## 2024-12-05 NOTE — RESULT ENCOUNTER NOTE
Ultrasound results reviewed and normal. Spoke to patient and advised she monitor area for growth or change and contact me if so.

## 2024-12-18 NOTE — PROGRESS NOTES
Adult Annual Physical  Name: Judith Velazquez      : 1970      MRN: [id number]  Encounter Provider: Sue Knowles DO  Encounter Date: 2024   Encounter department: [facility]    Assessment & Plan  Annual physical exam  Discussed diet and exercise  Her colon cancer screen has been done within the last year (cologuard). Will obtain records from PCP  Her mammogram was done last year at [facility]. Gyn (Dr Dyer) had given her rx for next mammo. Asked to be Cc'd on report  Cervical cancer screen up to date  Not sure when adacel was done and will obtain record of immunizations from previous PCP  Flu vaccine ordered today       Type 2 diabetes mellitus without complication, without long-term current use of insulin ([facility])    Lab Results   Component Value Date    HGBA1C 7.6 (H) 2024   Sees endocrinology at [facility] for this. Currently on metformin and ozempic.   The ozempic is recent and she is having some side effects so plans to discuss this with them at upcoming visit.   Eye exam up to date.        PVC's (premature ventricular contractions)  Sees Dr Gold at [facility] on   On metoprolol xl once daily       Nausea and vomiting, unspecified vomiting type  Suspect that this is related to her ozpemic but will get lipase, cbc and cmp as well as ultrasound abdomen  Call with results.   Can try pepid or omeprazole   Orders:  •  Lipase  •  CBC and differential  •  Comprehensive metabolic panel  •  US abdomen limited    Abdominal pain, unspecified abdominal location    Orders:  •  Lipase  •  CBC and differential  •  Comprehensive metabolic panel  •  US abdomen limited    Encounter for immunization    Orders:  •  influenza vaccine, recombinant, PF, 0.5 mL IM (Flublok)    Encounter for screening mammogram for malignant neoplasm of breast         BMI 36.0-36.9,adult         Major depressive disorder, recurrent episode, moderate (HCC)  Saw psych at [facility] along with  therapist.   Insurance changed and could no longer follow there.   Previously on medication but did not like it.   Doing well currently with respect to moods.        Attention deficit hyperactivity disorder, combined type         Mixed hyperlipidemia  On atorvastatin 40  Lab Results   Component Value Date    CHOLESTEROL 180 09/16/2024    CHOLESTEROL 256 (H) 04/02/2024     Lab Results   Component Value Date    HDL 48 (L) 09/16/2024    HDL 46 04/02/2024     Lab Results   Component Value Date    TRIG 130 09/16/2024    TRIG 210 (H) 04/02/2024     Lab Results   Component Value Date    NONHDLC 132 09/16/2024            Hearing loss of left ear, unspecified hearing loss type  Refer audiology  This is longstanding issue for her  Orders:  •  Ambulatory Referral to Audiology; Future    Snoring  Refer sleep specialist.   Orders:  •  Ambulatory Referral to Sleep Medicine; Future      Immunizations and preventive care screenings were discussed with patient today. Appropriate education was printed on patient's after visit summary.    Counseling:  Alcohol/drug use: discussed moderation in alcohol intake, the recommendations for healthy alcohol use, and avoidance of illicit drug use.  Dental Health: discussed importance of regular tooth brushing, flossing, and dental visits.  Injury prevention: discussed safety/seat belts, safety helmets, smoke detectors, carbon monoxide detectors, and smoking near bedding or upholstery.  Sexual health: discussed sexually transmitted diseases, partner selection, use of condoms, avoidance of unintended pregnancy, and contraceptive alternatives.  Exercise: the importance of regular exercise/physical activity was discussed. Recommend exercise 3-5 times per week for at least 30 minutes.          History of Present Illness     Adult Annual Physical:  Patient presents for annual physical.     Diet and Physical Activity:  - Diet/Nutrition: low carb diet.  - Exercise: no formal exercise. works at a [employer]  and on her feet all day. does a lot of walking. plans to start walking program formally.    General Health:  - Sleep: sleeps well.  - Hearing: decreased hearing left ear. saw audiology when she was a teenager  - Vision: no vision problems and wears glasses.  - Dental: regular dental visits.    Patient is a 54 year old female with DM2, hyperlipidemia, depression, anxiety and PVC's who is being seen today as a new patient to establish care. Due for PE   Previous PCP=Khushbu Cook MD at [facility].  There are no records for review at time of today's visit.     Cervical cancer screening is up to date (10/31/23).   No record of her mammogram or colon cancer screening. Thinks her last mammogram was at [facility].   Due for this soon so will place order  Thinks she had cologuard last year through PCP office.    She follows with [facility] for her DM2.   Lab Results   Component Value Date    HGBA1C 7.6 (H) 09/16/2024     Taking metformin and ozempic for this. The ozempic was started after her last labs in September. States that the ozempic is causing her side effects. Has nausea, vomiting and some heartburn. Also notes some epigastric abdominal pain/pressure.     On atorvastatin 40 for her hyperlipidemia.   She Is seeing [facility] cardiology for her PVC's and is on Toprol XL 25 mg /day    Wants to see audiology for her hearing loss in left ear. Ongoing since she was a teen  She also requests referral to see someone for sleep apnea assessment. She snores. Not sure whether apneic spells. Some daytime somnolence.       Review of Systems  Medical History Reviewed by provider this encounter:  Tobacco  Allergies  Meds  Problems  Med Hx  Surg Hx  Fam Hx  Soc   Hx    .  Current Outpatient Medications on File Prior to Visit   Medication Sig Dispense Refill   • atorvastatin (LIPITOR) 40 mg tablet Take 1 tablet (40 mg total) by mouth daily 90 tablet 3   • Blood Glucose Monitoring Suppl (Contour Next One) JOHN Inject 1  each under the skin daily 1 each 0   • Contour Next Test test strip Use 1 each daily Test as directed- 1x daily 100 each 1   • MAGNESIUM GLYCINATE  mg     • metFORMIN HCl  MG/5ML SRER Take 2 tablets by mouth daily 1 tablet in AM and 1 tablet in PM with Dinner.     • metoprolol succinate (TOPROL-XL) 25 mg 24 hr tablet Take 25 mg by mouth daily     • Microlet Lancets MISC Inject under the skin in the morning Test BG 1x daily 100 each 1   • Omega-3 Fatty Acids (OMEGA-3 PO) Take 3,000 mg by mouth in the morning     • semaglutide, 0.25 or 0.5 mg/dose, (Ozempic, 0.25 or 0.5 MG/DOSE,) 2 mg/3 mL injection pen Inject 0.25 mg weekly for 4 weeks then increase to 0.5 mg weekly. 3 mL 6   • TURMERIC PO Take 500 mg by mouth     • Vitamin D-Vitamin K (VITAMIN K2-VITAMIN D3 PO) Take by mouth D3 5000 IU  K2 100 MCG     • [DISCONTINUED] Ascorbic Acid (VITAMIN C PO) Take 1,000 mg by mouth (Patient not taking: Reported on 12/19/2024)     • [DISCONTINUED] B Complex CAPS Take by mouth (Patient not taking: Reported on 12/19/2024)     • [DISCONTINUED] fluorouracil (EFUDEX) 5 % cream Apply BID for 2 weeks to skin on left cheek. Wash hands after using and keep away from other household members and pets (Patient not taking: Reported on 12/19/2024) 40 g 2   • [DISCONTINUED] metFORMIN (GLUCOPHAGE-XR) 500 mg 24 hr tablet TAKE 4 TABLETS BY MOUTH EVERY DAY WITH DINNER 360 tablet 1     No current facility-administered medications on file prior to visit.        Objective   /64   Pulse 75   Temp 97.8 °F (36.6 °C)   Wt 103 kg (227 lb 6.4 oz)   SpO2 99%   BMI 36.70 kg/m²     Physical Exam  Vitals and nursing note reviewed.   Constitutional:       General: She is not in acute distress.     Appearance: Normal appearance. She is obese. She is not ill-appearing, toxic-appearing or diaphoretic.   HENT:      Head: Normocephalic and atraumatic.      Right Ear: Tympanic membrane normal.      Left Ear: Tympanic membrane normal.      Nose:  Nose normal.      Mouth/Throat:      Mouth: Mucous membranes are moist.      Pharynx: No posterior oropharyngeal erythema.   Eyes:      Extraocular Movements: Extraocular movements intact.      Conjunctiva/sclera: Conjunctivae normal.      Pupils: Pupils are equal, round, and reactive to light.   Neck:      Comments: No thyromegaly  Has fullness bilaterally in supraclavicular region, right greater than left  Cardiovascular:      Rate and Rhythm: Normal rate and regular rhythm.      Heart sounds: No murmur heard.  Pulmonary:      Effort: Pulmonary effort is normal.      Breath sounds: Normal breath sounds.   Abdominal:      General: Abdomen is flat. Bowel sounds are normal.      Palpations: Abdomen is soft.   Musculoskeletal:      Cervical back: Normal range of motion and neck supple.      Right lower leg: No edema.      Left lower leg: No edema.   Lymphadenopathy:      Cervical: No cervical adenopathy.   Skin:     General: Skin is warm and dry.      Findings: No rash.   Neurological:      General: No focal deficit present.      Mental Status: She is alert and oriented to person, place, and time.      Deep Tendon Reflexes: Reflexes normal.   Psychiatric:         Mood and Affect: Mood normal.

## 2024-12-18 NOTE — PATIENT INSTRUCTIONS
"Patient Education     Routine physical for adults   The Basics   Written by the doctors and editors at [facility]   What is a physical? -- A physical is a routine visit, or \"check-up,\" with your doctor. You might also hear it called a \"wellness visit\" or \"preventive visit.\"  During each visit, the doctor will:   Ask about your physical and mental health   Ask about your habits, behaviors, and lifestyle   Do an exam   Give you vaccines if needed   Talk to you about any medicines you take   Give advice about your health   Answer your questions  Getting regular check-ups is an important part of taking care of your health. It can help your doctor find and treat any problems you have. But it's also important for preventing health problems.  A routine physical is different from a \"sick visit.\" A sick visit is when you see a doctor because of a health concern or problem. Since physicals are scheduled ahead of time, you can think about what you want to ask the doctor.  How often should I get a physical? -- It depends on your age and health. In general, for people age 21 years and older:   If you are younger than 50 years, you might be able to get a physical every 3 years.   If you are 50 years or older, your doctor might recommend a physical every year.  If you have an ongoing health condition, like diabetes or high blood pressure, your doctor will probably want to see you more often.  What happens during a physical? -- In general, each visit will include:   Physical exam - The doctor or nurse will check your height, weight, heart rate, and blood pressure. They will also look at your eyes and ears. They will ask about how you are feeling and whether you have any symptoms that bother you.   Medicines - It's a good idea to bring a list of all the medicines you take to each doctor visit. Your doctor will talk to you about your medicines and answer any questions. Tell them if you are having any side effects that bother you. You " "should also tell them if you are having trouble paying for any of your medicines.   Habits and behaviors - This includes:   Your diet   Your exercise habits   Whether you smoke, drink alcohol, or use drugs   Whether you are sexually active   Whether you feel safe at home  Your doctor will talk to you about things you can do to improve your health and lower your risk of health problems. They will also offer help and support. For example, if you want to quit smoking, they can give you advice and might prescribe medicines. If you want to improve your diet or get more physical activity, they can help you with this, too.   Lab tests, if needed - The tests you get will depend on your age and situation. For example, your doctor might want to check your:   Cholesterol   Blood sugar   Iron level   Vaccines - The recommended vaccines will depend on your age, health, and what vaccines you already had. Vaccines are very important because they can prevent certain serious or deadly infections.   Discussion of screening - \"Screening\" means checking for diseases or other health problems before they cause symptoms. Your doctor can recommend screening based on your age, risk, and preferences. This might include tests to check for:   Cancer, such as breast, prostate, cervical, ovarian, colorectal, prostate, lung, or skin cancer   Sexually transmitted infections, such as chlamydia and gonorrhea   Mental health conditions like depression and anxiety  Your doctor will talk to you about the different types of screening tests. They can help you decide which screenings to have. They can also explain what the results might mean.   Answering questions - The physical is a good time to ask the doctor or nurse questions about your health. If needed, they can refer you to other doctors or specialists, too.  Adults older than 65 years often need other care, too. As you get older, your doctor will talk to you about:   How to prevent falling at " home   Hearing or vision tests   Memory testing   How to take your medicines safely   Making sure that you have the help and support you need at home  All topics are updated as new evidence becomes available and our peer review process is complete.  This topic retrieved from [employer] on: May 02, 2024.  Topic 714290 Version 1.0  Release: 32.4.3 - C32.122  © 2024 UpToDate, Inc. and/or its affiliates. All rights reserved.  Consumer Information Use and Disclaimer   Disclaimer: This generalized information is a limited summary of diagnosis, treatment, and/or medication information. It is not meant to be comprehensive and should be used as a tool to help the user understand and/or assess potential diagnostic and treatment options. It does NOT include all information about conditions, treatments, medications, side effects, or risks that may apply to a specific patient. It is not intended to be medical advice or a substitute for the medical advice, diagnosis, or treatment of a health care provider based on the health care provider's examination and assessment of a patient's specific and unique circumstances. Patients must speak with a health care provider for complete information about their health, medical questions, and treatment options, including any risks or benefits regarding use of medications. This information does not endorse any treatments or medications as safe, effective, or approved for treating a specific patient. UpToDate, Inc. and its affiliates disclaim any warranty or liability relating to this information or the use thereof.The use of this information is governed by the Terms of Use, available at https://www.woltersZikBituwer.com/en/know/clinical-effectiveness-terms. 2024© UpToDate, Inc. and its affiliates and/or licensors. All rights reserved.  Copyright   © 2024 UpToDate, Inc. and/or its affiliates. All rights reserved.

## 2024-12-19 ENCOUNTER — OFFICE VISIT (OUTPATIENT)
Dept: FAMILY MEDICINE CLINIC | Facility: CLINIC | Age: 54
End: 2024-12-19
Payer: COMMERCIAL

## 2024-12-19 ENCOUNTER — TELEPHONE (OUTPATIENT)
Dept: ADMINISTRATIVE | Facility: OTHER | Age: 54
End: 2024-12-19

## 2024-12-19 VITALS
SYSTOLIC BLOOD PRESSURE: 139 MMHG | HEART RATE: 75 BPM | WEIGHT: 227.4 LBS | OXYGEN SATURATION: 99 % | TEMPERATURE: 97.8 F | BODY MASS INDEX: 36.7 KG/M2 | DIASTOLIC BLOOD PRESSURE: 64 MMHG

## 2024-12-19 DIAGNOSIS — R10.9 ABDOMINAL PAIN, UNSPECIFIED ABDOMINAL LOCATION: ICD-10-CM

## 2024-12-19 DIAGNOSIS — Z23 ENCOUNTER FOR IMMUNIZATION: ICD-10-CM

## 2024-12-19 DIAGNOSIS — I49.3 PVC'S (PREMATURE VENTRICULAR CONTRACTIONS): ICD-10-CM

## 2024-12-19 DIAGNOSIS — E11.9 TYPE 2 DIABETES MELLITUS WITHOUT COMPLICATION, WITHOUT LONG-TERM CURRENT USE OF INSULIN (HCC): ICD-10-CM

## 2024-12-19 DIAGNOSIS — Z12.31 ENCOUNTER FOR SCREENING MAMMOGRAM FOR MALIGNANT NEOPLASM OF BREAST: ICD-10-CM

## 2024-12-19 DIAGNOSIS — F33.1 MAJOR DEPRESSIVE DISORDER, RECURRENT EPISODE, MODERATE (HCC): ICD-10-CM

## 2024-12-19 DIAGNOSIS — H91.92 HEARING LOSS OF LEFT EAR, UNSPECIFIED HEARING LOSS TYPE: ICD-10-CM

## 2024-12-19 DIAGNOSIS — R11.2 NAUSEA AND VOMITING, UNSPECIFIED VOMITING TYPE: ICD-10-CM

## 2024-12-19 DIAGNOSIS — Z00.00 ANNUAL PHYSICAL EXAM: Primary | ICD-10-CM

## 2024-12-19 DIAGNOSIS — F90.2 ATTENTION DEFICIT HYPERACTIVITY DISORDER, COMBINED TYPE: ICD-10-CM

## 2024-12-19 DIAGNOSIS — R06.83 SNORING: ICD-10-CM

## 2024-12-19 DIAGNOSIS — E78.2 MIXED HYPERLIPIDEMIA: ICD-10-CM

## 2024-12-19 PROCEDURE — 90471 IMMUNIZATION ADMIN: CPT

## 2024-12-19 PROCEDURE — 90673 RIV3 VACCINE NO PRESERV IM: CPT

## 2024-12-19 PROCEDURE — 99386 PREV VISIT NEW AGE 40-64: CPT | Performed by: FAMILY MEDICINE

## 2024-12-19 PROCEDURE — 99214 OFFICE O/P EST MOD 30 MIN: CPT | Performed by: FAMILY MEDICINE

## 2024-12-19 NOTE — ASSESSMENT & PLAN NOTE
Refer audiology  This is longstanding issue for her  Orders:  •  Ambulatory Referral to Audiology; Future

## 2024-12-19 NOTE — ASSESSMENT & PLAN NOTE
Saw psych at [facility] along with therapist.   Insurance changed and could no longer follow there.   Previously on medication but did not like it.   Doing well currently with respect to moods.

## 2024-12-19 NOTE — ASSESSMENT & PLAN NOTE
On atorvastatin 40  Lab Results   Component Value Date    CHOLESTEROL 180 09/16/2024    CHOLESTEROL 256 (H) 04/02/2024     Lab Results   Component Value Date    HDL 48 (L) 09/16/2024    HDL 46 04/02/2024     Lab Results   Component Value Date    TRIG 130 09/16/2024    TRIG 210 (H) 04/02/2024     Lab Results   Component Value Date    NONHDLC 132 09/16/2024

## 2024-12-19 NOTE — LETTER
Diabetic Eye Exam Form    Date Requested: 24  Patient: Judith Velazquez  Patient : 1970   Referring Provider: Sue Knowles DO      DIABETIC Eye Exam Date _______________________________      Type of Exam MUST be documented for Diabetic Eye Exams. Please CHECK ONE.     Retinal Exam       Dilated Retinal Exam       OCT       Optomap-Iris Exam      Fundus Photography       Left Eye - Please check Retinopathy or No Retinopathy        Exam did show retinopathy    Exam did not show retinopathy       Right Eye - Please check Retinopathy or No Retinopathy       Exam did show retinopathy    Exam did not show retinopathy       Comments __________________________________________________________    Practice Providing Exam ______________________________________________    Exam Performed By (print name) _______________________________________      Provider Signature ___________________________________________________      These reports are needed for  compliance.  Please fax this completed form and a copy of the Diabetic Eye Exam report to our office located at [address] [place] [address] as soon as possible via Fax [phone] félix Vasquez: Phone [phone]  We thank you for your assistance in treating our mutual patient.

## 2024-12-19 NOTE — TELEPHONE ENCOUNTER
----- Message from Kristi STOVER sent at 12/19/2024  9:44 AM EST -----  Regarding: Care Gap Request  08/01/24 8:48 AM    Hello, our patient Judith has had Mammogram completed/performed. Please assist in updating the patient chart by making an External outreach to [facility] facility located in [place]. The date of service is 12/2023.    Thank you,  Ashleigh Fothergill [facility]           12/19/24 9:46 AM    Hello, our patient Judith Velazquez has had Diabetic Eye Exam completed/performed. Please assist in updating the patient chart by making an External outreach to [facility] facility located in [place]. The date of service is 2023.    Thank you,  Ashleigh Fothergill [facility]

## 2024-12-19 NOTE — ASSESSMENT & PLAN NOTE
Lab Results   Component Value Date    HGBA1C 7.6 (H) 09/16/2024   Sees endocrinology at [facility] for this. Currently on metformin and ozempic.   The ozempic is recent and she is having some side effects so plans to discuss this with them at upcoming visit.   Eye exam up to date.

## 2024-12-19 NOTE — LETTER
Procedure Request Form: Mammogram      Date Requested: 24  Patient: Judith Velazquez  Patient : 1970   Referring Provider: Sue Knowles, DO        Date of Procedure ______________________________       The above patient has informed us that they have completed their   most recent Mammogram at your facility. Please complete   this form and attach all corresponding procedure reports/results.    Comments __________________________________________________________  ____________________________________________________________________  ____________________________________________________________________  ____________________________________________________________________    Facility Completing Procedure _________________________________________    Form Completed By (print name) _______________________________________      Signature __________________________________________________________      These reports are needed for  compliance.    Please fax this completed form and a copy of the procedure report to our office located at [address] [place] [address] as soon as possible to Fax [phone] félix Vasquez: Phone [phone]    We thank you for your assistance in treating our mutual patient.

## 2024-12-20 ENCOUNTER — TELEPHONE (OUTPATIENT)
Dept: ADMINISTRATIVE | Facility: OTHER | Age: 54
End: 2024-12-20

## 2024-12-20 NOTE — TELEPHONE ENCOUNTER
----- Message from Kristi STOVER sent at 12/20/2024  8:55 AM EST -----  Regarding: Care Gap Request  12/20/24 8:55 AM    Hello, our patient Judith Velazquez has had CRC: Cologuard completed/performed. Please assist in updating the patient chart by pulling the document from Lab Tab within Chart Review. The date of service is 10/2023.     Thank you,  Ashleigh Fothergill  [facility]

## 2024-12-23 NOTE — TELEPHONE ENCOUNTER
Upon review of the In Basket request and the patient's chart, initial outreach has been made via fax to facility. Please see Contacts section for details.     Thank you  Pedro Abernathy MA

## 2024-12-26 NOTE — TELEPHONE ENCOUNTER
Upon review of the In Basket request we were able to locate, review, and update the patient chart as requested for Diabetic Eye Exam.    Any additional questions or concerns should be emailed to the Practice Liaisons via the appropriate education email address, please do not reply via In Basket.    Thank you  Pedro Abernathy MA   [facility]

## 2024-12-26 NOTE — TELEPHONE ENCOUNTER
Upon review of the In Basket request we were able to locate, review, and update the patient chart as requested for Mammogram.    Any additional questions or concerns should be emailed to the Practice Liaisons via the appropriate education email address, please do not reply via In Basket.    Thank you  Pedro Abernathy MA   [facility]

## 2024-12-27 ENCOUNTER — OFFICE VISIT (OUTPATIENT)
Dept: SLEEP CENTER | Facility: CLINIC | Age: 54
End: 2024-12-27
Payer: COMMERCIAL

## 2024-12-27 VITALS
WEIGHT: 227 LBS | DIASTOLIC BLOOD PRESSURE: 81 MMHG | SYSTOLIC BLOOD PRESSURE: 123 MMHG | HEIGHT: 66 IN | BODY MASS INDEX: 36.48 KG/M2

## 2024-12-27 DIAGNOSIS — E66.9 OBESITY (BMI 30-39.9): ICD-10-CM

## 2024-12-27 DIAGNOSIS — F33.1 MAJOR DEPRESSIVE DISORDER, RECURRENT EPISODE, MODERATE (HCC): ICD-10-CM

## 2024-12-27 DIAGNOSIS — K21.9 GASTROESOPHAGEAL REFLUX DISEASE, UNSPECIFIED WHETHER ESOPHAGITIS PRESENT: ICD-10-CM

## 2024-12-27 DIAGNOSIS — I49.3 PVC'S (PREMATURE VENTRICULAR CONTRACTIONS): ICD-10-CM

## 2024-12-27 DIAGNOSIS — G47.9 SLEEP DISTURBANCE: ICD-10-CM

## 2024-12-27 DIAGNOSIS — R06.89 GASPING FOR BREATH: ICD-10-CM

## 2024-12-27 DIAGNOSIS — F90.2 ATTENTION DEFICIT HYPERACTIVITY DISORDER, COMBINED TYPE: ICD-10-CM

## 2024-12-27 DIAGNOSIS — G47.19 EXCESSIVE DAYTIME SLEEPINESS: ICD-10-CM

## 2024-12-27 DIAGNOSIS — R06.83 SNORING: Primary | ICD-10-CM

## 2024-12-27 LAB
ALBUMIN SERPL-MCNC: 4.1 G/DL (ref 3.8–4.9)
ALP SERPL-CCNC: 98 IU/L (ref 44–121)
ALT SERPL-CCNC: 15 IU/L (ref 0–32)
AST SERPL-CCNC: 14 IU/L (ref 0–40)
BASOPHILS # BLD AUTO: 0 X10E3/UL (ref 0–0.2)
BASOPHILS NFR BLD AUTO: 1 %
BILIRUB SERPL-MCNC: 0.3 MG/DL (ref 0–1.2)
BUN SERPL-MCNC: 10 MG/DL (ref 6–24)
BUN/CREAT SERPL: 15 (ref 9–23)
CALCIUM SERPL-MCNC: 9.4 MG/DL (ref 8.7–10.2)
CHLORIDE SERPL-SCNC: 104 MMOL/L (ref 96–106)
CHOLEST SERPL-MCNC: 149 MG/DL (ref 100–199)
CHOLEST/HDLC SERPL: 3.6 RATIO (ref 0–4.4)
CO2 SERPL-SCNC: 24 MMOL/L (ref 20–29)
CREAT SERPL-MCNC: 0.68 MG/DL (ref 0.57–1)
EGFR: 105 ML/MIN/1.73
EOSINOPHIL # BLD AUTO: 0.5 X10E3/UL (ref 0–0.4)
EOSINOPHIL NFR BLD AUTO: 6 %
ERYTHROCYTE [DISTWIDTH] IN BLOOD BY AUTOMATED COUNT: 12.3 % (ref 11.7–15.4)
EST. AVERAGE GLUCOSE BLD GHB EST-MCNC: 146 MG/DL
GLOBULIN SER-MCNC: 2 G/DL (ref 1.5–4.5)
GLUCOSE SERPL-MCNC: 111 MG/DL (ref 70–99)
HBA1C MFR BLD: 6.7 % (ref 4.8–5.6)
HCT VFR BLD AUTO: 40 % (ref 34–46.6)
HDLC SERPL-MCNC: 41 MG/DL
HGB BLD-MCNC: 12.9 G/DL (ref 11.1–15.9)
IMM GRANULOCYTES # BLD: 0 X10E3/UL (ref 0–0.1)
IMM GRANULOCYTES NFR BLD: 0 %
LDLC SERPL CALC-MCNC: 86 MG/DL (ref 0–99)
LYMPHOCYTES # BLD AUTO: 3.1 X10E3/UL (ref 0.7–3.1)
LYMPHOCYTES NFR BLD AUTO: 42 %
MCH RBC QN AUTO: 29.2 PG (ref 26.6–33)
MCHC RBC AUTO-ENTMCNC: 32.3 G/DL (ref 31.5–35.7)
MCV RBC AUTO: 91 FL (ref 79–97)
MONOCYTES # BLD AUTO: 0.5 X10E3/UL (ref 0.1–0.9)
MONOCYTES NFR BLD AUTO: 7 %
NEUTROPHILS # BLD AUTO: 3.4 X10E3/UL (ref 1.4–7)
NEUTROPHILS NFR BLD AUTO: 44 %
PLATELET # BLD AUTO: 262 X10E3/UL (ref 150–450)
POTASSIUM SERPL-SCNC: 4.6 MMOL/L (ref 3.5–5.2)
PROT SERPL-MCNC: 6.1 G/DL (ref 6–8.5)
RBC # BLD AUTO: 4.42 X10E6/UL (ref 3.77–5.28)
SL AMB VLDL CHOLESTEROL CALC: 22 MG/DL (ref 5–40)
SODIUM SERPL-SCNC: 140 MMOL/L (ref 134–144)
TRIGL SERPL-MCNC: 119 MG/DL (ref 0–149)
TSH SERPL DL<=0.005 MIU/L-ACNC: 1.06 UIU/ML (ref 0.45–4.5)
WBC # BLD AUTO: 7.5 X10E3/UL (ref 3.4–10.8)

## 2024-12-27 PROCEDURE — 99244 OFF/OP CNSLTJ NEW/EST MOD 40: CPT | Performed by: INTERNAL MEDICINE

## 2024-12-27 NOTE — PROGRESS NOTES
Consultation - Sleep Center   Judith Velazquez  54 y.o. female  :1970  MRN:[id number]  DOS:2024    Physician Requesting Consult: Sue Knowles DO             Reason for Consult : At your kind request I saw Judith Velazquez for initial sleep evaluation today. The patient is here to evaluate for suspected Obstructive Sleep Apnea.  Patient's presents with:.    PFSH, Problem List, Medications & Allergies were reviewed in EMR.    Judith  has a past medical history of Carpal tunnel syndrome, Depression, Diabetes 1.5, managed as type 2 (HCC), Hyperlipidemia, and PVC's (premature ventricular contractions).      She has a current medication list which includes the following prescription(s): atorvastatin, contour next one, contour next test, magnesium glycinate, metformin hcl er, metoprolol succinate, microlet lancets, omega-3 fatty acids, semaglutide (0.25 or 0.5 mg/dose), turmeric, and vitamin d-vitamin k.      HPI: She is here for 's complaints of loud snoring snoring of several years duration and at times is worse than others.  Judith is aware of of snoring and at times awakens herself with gasping.  Other complaints:(Patient-Rptd) Bedwetting as an adult, Heartburn during sleeping . Restless Leg Syndrome: Reports no suggestive symptoms.  .  Parasomnia: No features reported    Sleep Routine (averaged): Typical Bedtime: (Patient-Rptd) 9:30 pm.  Gets OOB: (Patient-Rptd) 6:00 am. TIB:8.5 hrs.   Sleep latency:< 15 minutes; Sleep Interruptions: 3-4, (Patient-Rptd) Unsure because of nocturia, not always sure of the cause, and able to fall back asleep. Awakens: Needing an alarm  Upon awakening: (Patient-Rptd) Rarely feels refreshed.  She estimates getting (Patient-Rptd) 8hrs sleep.  Daytime Function:Judith reports daytime sleepiness feels like napping but does not have the opportunity, struggles to stay alert at work, at times in conversations with others and dozes off when sedentary at  "home. She rated herself at Total score: (Patient-Rptd) 19 /24 on the Leonard Sleepiness Scale.     Habits:   reports that she has never smoked. She has never used smokeless tobacco.;  reports current alcohol use.; Reports no history of drug use.;  E-Cigarette/Vaping  Never User; Caffeine use:limited until (Patient-Rptd) Before 12:00 pm; Exercise routine: sometimes.    Occupation: (Patient-Rptd) Work Part Time [employer] License: (Patient-Rptd) No  Family History: Father has obstructive sleep apnea  ROS: Significant for around 20 pounds intentional weight reduction in the past year since on Ozempic..  She reported no nasal or respiratory symptoms.  She has acid reflux.  Palpitations have decreased since she is on metoprolol.  She discontinued medication for depression and ADHD because \"did not like the way it made me feel \".    EXAM:  /81   Ht 5' 6\" (1.676 m)   Wt 103 kg (227 lb)   BMI 36.64 kg/m²    General: Well groomed female, well appearing, in no apparent distress.   Neurological: Alert and orientated; cooperative; Cranial nerves intact;    Psychiatric: Speech: Clear and coherent; normal mood, affect & thought   Skin: Warm and dry; Color& Hydration good; no facial rashes or lesions   HEENT:  Craniofacial anatomy: normal Sinuses: Non-tender. TMJ: Normal    Eyes: EOM's intact; conjunctiva/corneas clear   Ears: Appear normal     Nasal Airway: is patent Septum: Intact; Turbinates: Normal; Rhinorrhea: None  Mouth: Lips: Normal posture; Dentition: normal . Mucosa: Moist; Hard Palate:normal    Oropharryx: crowded and AP narrowing Tongue: Mallampati:Class IV, Mobile, and ScallopedSoft Palate:  redundant  Tonsils: absent  Neck:; neck Circumference: 14.5 \"; supple; no abnormal masses; Thyroid: Normal. Trachea: Central.    Lymph: No cervical Lymhadenopathy  Heart: S1,S2 normal; RRR; no gallop; no murmur   Lungs: Respiratory Effort: Normal. Air entry good bilaterally.  No wheezes.  No rales  Abdomen: Obese, soft & " non-tender    Extremities: No pedal edema.  No clubbing or cyanosis.    Musculoskeletal:  Motor normal; Gait: Normal.       Last CMP demonstrated CO2 of 24 mmol/L, elevated fasting glucose and otherwise unremarkable.  CBC was normal    IMPRESSION: Primary/Secondary Sleep Diagnoses (to Medical or Psych conditions) & Comorbidities   1. Snoring  Ambulatory Referral to Sleep Medicine    Diagnostic Sleep Study      2. Gasping for breath  Diagnostic Sleep Study      3. Sleep disturbance  Diagnostic Sleep Study      4. Excessive daytime sleepiness  Diagnostic Sleep Study      5. PVC's (premature ventricular contractions)        6. Major depressive disorder, recurrent episode, moderate (HCC)        7. Attention deficit hyperactivity disorder, combined type        8. Obesity (BMI 30-39.9)        9. Gastroesophageal reflux disease, unspecified whether esophagitis present             PLAN:   1. With respect to above conditions, comprehensive counseling provided on pathophysiology; effects on symptoms and comorbidities, diagnostic strategies & limitations; treatment options; risks or no treament; risks & benefits of the various therapeutic options; costs and insurance aspects.     2. I advised weight reduction, avoiding sleeping supine, using alcohol or sedating medications close to bed time and on safe driving practices.    3. Further evaluation is indicated and a sleep study will be scheduled.  Patient understands limitations of home sleep testing and in lab study may be necessary.  4. Patient is willing to try Positive airway pressure therapy and will be scheduled for a titration study if indicated.  5. Follow-up to be scheduled after testing/initiation of therapy to review results, further details of treatment options and to adjust therapy.    Sincerely,      Authenticated electronically on 12/27/24   Board Certified Specialist     Portions of the record may have been created with voice recognition software. Occasional  "wrong word or \"sound a like\" substitutions may have occurred due to the inherent limitations of voice recognition software. There may also be notations and random deletions of words or characters from malfunctioning software. Read the chart carefully and recognize, using context, where substitutions/deletions have occurred.     "

## 2024-12-27 NOTE — PATIENT INSTRUCTIONS
What is ASHLEY?   Obstructive sleep apnea is a common and serious sleep disorder that causes you to stop breathing during sleep. The airway repeatedly becomes blocked, limiting the amount of air that reaches your lungs. When this happens, you may snore loudly or making choking noises as you try to breathe. Your brain and body becomes oxygen deprived and you may wake up. This may happen a few times a night, or in more severe cases, several hundred times a night.   Sleep apnea can make you wake up in the morning feeling tired or unrefreshed even though you have had a full night of sleep. During the day, you may feel fatigued, have difficulty concentrating or you may even unintentionally fall asleep. This is because your body is waking up numerous times throughout the night, even though you might not be conscious of each awakening.  The lack of oxygen your body receives can have negative long-term consequences for your health. This includes:  High blood pressure  Heart disease  Irregular heart rhythms  Stroke  Pre-diabetes and diabetes  Depression  Testing  An objective evaluation of your sleep may be needed before your board certified sleep physician can make a diagnosis. Options include:   In-lab overnight sleep study  This type of sleep study requires you to stay overnight at a sleep center, in a bed that may resemble a hotel room. You will sleep with sensors hooked up to various parts of your body. These sensors record your brain waves, heartbeat, breathing and movement. An overnight sleep study also provides your doctor with the most complete information about your sleep. Learn more about an overnight sleep study.   Home sleep apnea test  Some patients with high risk factors for obstructive sleep apnea and no other medical disorders may be candidates for a home sleep apnea test. The testing equipment differs in that it is less complicated than what is used in an overnight sleep study. As such, does not give all the  data an in-lab will and if negative, may not mean you do not have the problem.  Treatment for sleep apnea includes using a continuous positive airway pressure (CPAP) machine to keep your airway open during sleep. A mask is placed over your nose and mouth, or just your nose. The mask is hooked to the CPAP machine that blows a gentle stream of air into the mask when you breathe. This helps keep your airway open so you can breathe more regularly. Extra oxygen may be given to you through the machine. You may be given a mouth device. It looks like a mouth guard or dental retainer and stops your tongue and mouth tissues from blocking your throat while you sleep. Surgery may be needed to remove extra tissues that block your mouth, throat, or nose.  Manage sleep apnea:   Do not smoke. Nicotine and other chemicals in cigarettes and cigars can cause lung damage. Ask your healthcare provider for information if you currently smoke and need help to quit. E-cigarettes or smokeless tobacco still contain nicotine. Talk to your healthcare provider before you use these products.  Do not drink alcohol or take sedative medicine before you go to sleep. Alcohol and sedatives can relax the muscles and tissues around your throat. This can block the airflow to your lungs.  Maintain a healthy weight. Excess tissue around your throat may restrict your breathing. Ask your healthcare provider for information if you need to lose weight.  Sleep on your side or use pillows designed to prevent sleep apnea. This prevents your tongue or other tissues from blocking your throat. You can also raise the head of your bed.  Driving Safety. Refrain from driving when drowsy.   Follow up with your healthcare provider as directed: Write down your questions so you remember to ask them during your visits.  Go to AASM website for more information: Sleepeducation.org  What is ASHLEY?   Obstructive sleep apnea is a common and serious sleep disorder that causes you to  stop breathing during sleep. The airway repeatedly becomes blocked, limiting the amount of air that reaches your lungs. When this happens, you may snore loudly or making choking noises as you try to breathe. Your brain and body becomes oxygen deprived and you may wake up. This may happen a few times a night, or in more severe cases, several hundred times a night.   Sleep apnea can make you wake up in the morning feeling tired or unrefreshed even though you have had a full night of sleep. During the day, you may feel fatigued, have difficulty concentrating or you may even unintentionally fall asleep. This is because your body is waking up numerous times throughout the night, even though you might not be conscious of each awakening.  The lack of oxygen your body receives can have negative long-term consequences for your health. This includes:  High blood pressure  Heart disease  Irregular heart rhythms  Stroke  Pre-diabetes and diabetes  Depression  Testing  An objective evaluation of your sleep may be needed before your board certified sleep physician can make a diagnosis. Options include:   In-lab overnight sleep study  This type of sleep study requires you to stay overnight at a sleep center, in a bed that may resemble a hotel room. You will sleep with sensors hooked up to various parts of your body. These sensors record your brain waves, heartbeat, breathing and movement. An overnight sleep study also provides your doctor with the most complete information about your sleep. Learn more about an overnight sleep study.   Home sleep apnea test  Some patients with high risk factors for obstructive sleep apnea and no other medical disorders may be candidates for a home sleep apnea test. The testing equipment differs in that it is less complicated than what is used in an overnight sleep study. As such, does not give all the data an in-lab will and if negative, may not mean you do not have the problem.  Treatment for  sleep apnea includes using a continuous positive airway pressure (CPAP) machine to keep your airway open during sleep. A mask is placed over your nose and mouth, or just your nose. The mask is hooked to the CPAP machine that blows a gentle stream of air into the mask when you breathe. This helps keep your airway open so you can breathe more regularly. Extra oxygen may be given to you through the machine. You may be given a mouth device. It looks like a mouth guard or dental retainer and stops your tongue and mouth tissues from blocking your throat while you sleep. Surgery may be needed to remove extra tissues that block your mouth, throat, or nose.  Manage sleep apnea:   Do not smoke. Nicotine and other chemicals in cigarettes and cigars can cause lung damage. Ask your healthcare provider for information if you currently smoke and need help to quit. E-cigarettes or smokeless tobacco still contain nicotine. Talk to your healthcare provider before you use these products.  Do not drink alcohol or take sedative medicine before you go to sleep. Alcohol and sedatives can relax the muscles and tissues around your throat. This can block the airflow to your lungs.  Maintain a healthy weight. Excess tissue around your throat may restrict your breathing. Ask your healthcare provider for information if you need to lose weight.  Sleep on your side or use pillows designed to prevent sleep apnea. This prevents your tongue or other tissues from blocking your throat. You can also raise the head of your bed.  Driving Safety. Refrain from driving when drowsy.   Follow up with your healthcare provider as directed: Write down your questions so you remember to ask them during your visits.  Go to AASM website for more information: Sleepeducation.org        Nursing Support:  When: Monday through Friday 7:30A-4:30PM except holidays  Where: Our direct line is [phone]  *3  *1.      If you are having a true emergency please call 911.  In  the event that the line is busy or it is after hours please leave a voice message and we will return your call.  Please speak clearly, leaving your full name, birth date, best number to reach you and the reason for your call.   Medication refills: We will need the name of the medication, the dosage, the ordering provider, whether you get a 30 or 90 day refill, and the pharmacy name and address.  Medications will be ordered by the provider only.  Nurses cannot call in prescriptions.  Please allow 7 days for medication refills.  Physician requested updates: If your provider requested that you call with an update after starting medication, please be ready to provide us the medication and dosage, what time you take your medication, the time you attempt to fall asleep, time you fall asleep, when you wake up, and what time you get out of bed.  Sleep Study Results: We will contact you with sleep study results and/or next steps after the physician has reviewed your testing.

## 2024-12-31 ENCOUNTER — RESULTS FOLLOW-UP (OUTPATIENT)
Dept: ENDOCRINOLOGY | Facility: HOSPITAL | Age: 54
End: 2024-12-31

## 2025-01-06 ENCOUNTER — TELEPHONE (OUTPATIENT)
Age: 55
End: 2025-01-06

## 2025-01-06 ENCOUNTER — DOCUMENTATION (OUTPATIENT)
Dept: PSYCHIATRY | Facility: CLINIC | Age: 55
End: 2025-01-06

## 2025-01-06 DIAGNOSIS — Z12.31 ENCOUNTER FOR SCREENING MAMMOGRAM FOR MALIGNANT NEOPLASM OF BREAST: Primary | ICD-10-CM

## 2025-01-06 NOTE — TELEPHONE ENCOUNTER
Patient would like a script for a mammogram.  She will be going to [facility]

## 2025-01-06 NOTE — PSYCH
Psychiatric Discharge Summary   Discharge Summary: Routine   Discharge Diagnosis:No diagnosis found.  Treating Physician: , Treatment Complications: none, Admit with discharge:   Prognosis at time of discharge: Good  Presenting Problems/Pertinent Findings: depression, anxiety   Therapist:   Course of Treatment: Medication Management  Summary of Treatment Progress: pt completed meds taper off    To what extent did the consumer achieve their goals? Most  Criteria for Discharge: Have completed tx goals and objectives and are no longer in need of services  Aftercare Recommendations: Follow up with pcp  Discharge Medications:   Current Outpatient Medications:     atorvastatin (LIPITOR) 40 mg tablet, Take 1 tablet (40 mg total) by mouth daily, Disp: 90 tablet, Rfl: 3    Blood Glucose Monitoring Suppl (Contour Next One) JOHN, Inject 1 each under the skin daily, Disp: 1 each, Rfl: 0    Contour Next Test test strip, Use 1 each daily Test as directed- 1x daily, Disp: 100 each, Rfl: 1    MAGNESIUM GLYCINATE PO, 120 mg, Disp: , Rfl:     metFORMIN HCl  MG/5ML SRER, Take 2 tablets by mouth daily 1 tablet in AM and 1 tablet in PM with Dinner., Disp: , Rfl:     metoprolol succinate (TOPROL-XL) 25 mg 24 hr tablet, Take 25 mg by mouth daily, Disp: , Rfl:     Microlet Lancets MISC, Inject under the skin in the morning Test BG 1x daily, Disp: 100 each, Rfl: 1    Omega-3 Fatty Acids (OMEGA-3 PO), Take 3,000 mg by mouth in the morning, Disp: , Rfl:     semaglutide, 0.25 or 0.5 mg/dose, (Ozempic, 0.25 or 0.5 MG/DOSE,) 2 mg/3 mL injection pen, Inject 0.25 mg weekly for 4 weeks then increase to 0.5 mg weekly., Disp: 3 mL, Rfl: 6    TURMERIC PO, Take 500 mg by mouth, Disp: , Rfl:     Vitamin D-Vitamin K (VITAMIN K2-VITAMIN D3 PO), Take by mouth D3 5000 IU K2 100 MCG, Disp: , Rfl:      Describe consumers ability and willingness to work and solve her mental problem.       Substance Abuse History:  Social History     Substance  and Sexual Activity   Drug Use Never       Family Psychiatric History:   Family History   Problem Relation Age of Onset    Diabetes Mother     Diabetes Father     ALS Father 76    Heart attack Father 50       The following portions of the patient's history were reviewed and updated as appropriate: allergies, current medications, past family history, past medical history, past social history, past surgical history, and problem list.    Social History     Socioeconomic History    Marital status: /Civil Union     Spouse name: Not on file    Number of children: Not on file    Years of education: Not on file    Highest education level: Not on file   Occupational History    Not on file   Tobacco Use    Smoking status: Never    Smokeless tobacco: Never   Vaping Use    Vaping status: Never Used   Substance and Sexual Activity    Alcohol use: Yes    Drug use: Never    Sexual activity: Yes     Partners: Male     Birth control/protection: Male Sterilization, Female Sterilization     Comment: Tubal   Other Topics Concern    Not on file   Social History Narrative    Works at [employer]      Social Drivers of Health     Financial Resource Strain: Not on file   Food Insecurity: Not on file   Transportation Needs: Not on file   Physical Activity: Not on file   Stress: Not on file   Social Connections: Not on file   Intimate Partner Violence: Not on file   Housing Stability: Not on file     Social History     Social History Narrative    Works at [employer]        Mental Status at Time of most recent visit on please see note 3/26/24.

## 2025-01-07 ENCOUNTER — OFFICE VISIT (OUTPATIENT)
Dept: ENDOCRINOLOGY | Facility: HOSPITAL | Age: 55
End: 2025-01-07
Payer: COMMERCIAL

## 2025-01-07 ENCOUNTER — HOSPITAL ENCOUNTER (OUTPATIENT)
Facility: HOSPITAL | Age: 55
Discharge: HOME | End: 2025-01-07
Attending: INTERNAL MEDICINE
Payer: COMMERCIAL

## 2025-01-07 VITALS
HEIGHT: 66 IN | SYSTOLIC BLOOD PRESSURE: 126 MMHG | WEIGHT: 225.8 LBS | DIASTOLIC BLOOD PRESSURE: 84 MMHG | BODY MASS INDEX: 36.29 KG/M2 | HEART RATE: 84 BPM

## 2025-01-07 DIAGNOSIS — F33.1 MAJOR DEPRESSIVE DISORDER, RECURRENT EPISODE, MODERATE (HCC): ICD-10-CM

## 2025-01-07 DIAGNOSIS — G47.9 SLEEP DISTURBANCE: ICD-10-CM

## 2025-01-07 DIAGNOSIS — G47.19 EXCESSIVE DAYTIME SLEEPINESS: ICD-10-CM

## 2025-01-07 DIAGNOSIS — Z79.4 TYPE 2 DIABETES MELLITUS WITH HYPERGLYCEMIA, WITH LONG-TERM CURRENT USE OF INSULIN (HCC): Primary | ICD-10-CM

## 2025-01-07 DIAGNOSIS — E11.65 TYPE 2 DIABETES MELLITUS WITH HYPERGLYCEMIA, WITH LONG-TERM CURRENT USE OF INSULIN (HCC): Primary | ICD-10-CM

## 2025-01-07 DIAGNOSIS — E66.813 CLASS 3 OBESITY: ICD-10-CM

## 2025-01-07 DIAGNOSIS — R06.89 GASPING FOR BREATH: ICD-10-CM

## 2025-01-07 DIAGNOSIS — R06.83 SNORING: ICD-10-CM

## 2025-01-07 DIAGNOSIS — E78.2 MIXED HYPERLIPIDEMIA: ICD-10-CM

## 2025-01-07 PROCEDURE — 99214 OFFICE O/P EST MOD 30 MIN: CPT | Performed by: NURSE PRACTITIONER

## 2025-01-07 PROCEDURE — 95810 POLYSOM 6/> YRS 4/> PARAM: CPT | Performed by: INTERNAL MEDICINE

## 2025-01-07 PROCEDURE — 95810 POLYSOM 6/> YRS 4/> PARAM: CPT

## 2025-01-07 NOTE — PROGRESS NOTES
Judith Velazquez 54 y.o. female MRN: [id number]    Encounter: [id number]      Assessment & Plan     Assessment:  This is a 54 y.o.-year-old female with type 2 diabetes, hypertension and hyperlipidemia.    Plan:  1) T2DM: Recent hemoglobin A1c is improved to 6.7.  No blood sugars available for review.  After some discussion, we will decrease her metformin XR to 500 mg twice daily.  Her BMI is 36.45.  Will continue Ozempic 0.5 mg weekly.  She will contact the office with any side effects or concerns. She will provide blood sugars checked twice daily at alternating times.  Hemoglobin A1c prior to next visit.     2) Hypertension: She is normotensive in the office today.  Continue metoprolol.  Check comprehensive metabolic panel prior to next visit.     3) Hyperlipidemia: Improved.  Continue atorvastatin.    CC: Type 2 Diabetes follow up    History of Present Illness     HPI:  54 y.o. female with a history of type 2 diabetes Without any reported complications with other PMHx of htn, hlp, class 3 obesity, MDD, MARI, ASHLEY who presents today for follow-up.  She was diagnosed with type 2 diabetes in December 2023.  She does have a history of gestational diabetes with her third son however, this was managed with lifestyle changes only.  She is currently treated with metformin 500 mg daily at dinner and Ozempic 0.5 mg weekly.  She does complain of some GERD like symptoms at times in the evenings.  Recent hemoglobin A1c from December 26, 2024 is improved to 6.7.  She denies any polyuria, polydipsia or polyphasia.  He denies any recent episodes of hypoglycemia.  Diabetic eye exams and diabetic foot exams are up-to-date at this time.   also has insulin-dependent diabetes.     For her hypertension, she is treated with metoprolol.  Her hyperlipidemia is treated with atorvastatin.     Review of Systems   Constitutional: Negative.  Negative for chills, fatigue and fever.   HENT: Negative.  Negative for trouble swallowing  and voice change.    Eyes: Negative.  Negative for photophobia, pain, discharge, redness, itching and visual disturbance.   Respiratory: Negative.  Negative for chest tightness and shortness of breath.    Cardiovascular: Negative.  Negative for chest pain.   Gastrointestinal:  Positive for abdominal pain (attributed to GERD). Negative for constipation, diarrhea and vomiting.   Endocrine: Negative for cold intolerance, heat intolerance, polydipsia, polyphagia and polyuria.   Genitourinary: Negative.    Musculoskeletal: Negative.    Skin: Negative.    Allergic/Immunologic: Negative.    Neurological: Negative.  Negative for dizziness, syncope, light-headedness and headaches.   Hematological: Negative.    Psychiatric/Behavioral: Negative.     All other systems reviewed and are negative.      Historical Information   Past Medical History:   Diagnosis Date    ADHD (attention deficit hyperactivity disorder)     Atrial fibrillation (HCC)     Carpal tunnel syndrome     Chronic cough     Started few years ago    Depression     Diabetes 1.5, managed as type 2 (HCC)     Diabetes mellitus (HCC)     GERD (gastroesophageal reflux disease)     Hyperlipidemia     PVC's (premature ventricular contractions)     Shingles 2005     Past Surgical History:   Procedure Laterality Date     SECTION      , ,     HERNIA REPAIR Left     age 6    HERNIA REPAIR Right     age 5    TUBAL LIGATION           Social History   Social History     Substance and Sexual Activity   Alcohol Use Yes     Social History     Substance and Sexual Activity   Drug Use Never     Social History     Tobacco Use   Smoking Status Never   Smokeless Tobacco Never     Family History:   Family History   Problem Relation Age of Onset    Diabetes Mother     Dementia Mother     Diabetes Father     ALS Father 76    Heart attack Father 50    Sleep apnea Father     Snoring Father     Heart disease Father     Stroke Father        Meds/Allergies  "  Current Outpatient Medications   Medication Sig Dispense Refill    atorvastatin (LIPITOR) 40 mg tablet Take 1 tablet (40 mg total) by mouth daily 90 tablet 3    Blood Glucose Monitoring Suppl (Contour Next One) JOHN Inject 1 each under the skin daily 1 each 0    Contour Next Test test strip Use 1 each daily Test as directed- 1x daily 100 each 1    MAGNESIUM GLYCINATE  mg      metFORMIN HCl  MG/5ML SRER Take 2 tablets by mouth daily 1 tablet in AM and 1 tablet in PM with Dinner.      metoprolol succinate (TOPROL-XL) 25 mg 24 hr tablet Take 25 mg by mouth daily      Microlet Lancets MISC Inject under the skin in the morning Test BG 1x daily 100 each 1    Omega-3 Fatty Acids (OMEGA-3 PO) Take 3,000 mg by mouth in the morning      semaglutide, 0.25 or 0.5 mg/dose, (Ozempic, 0.25 or 0.5 MG/DOSE,) 2 mg/3 mL injection pen Inject 0.25 mg weekly for 4 weeks then increase to 0.5 mg weekly. 3 mL 6    TURMERIC PO Take 500 mg by mouth      Vitamin D-Vitamin K (VITAMIN K2-VITAMIN D3 PO) Take by mouth D3 5000 IU  K2 100 MCG       No current facility-administered medications for this visit.     No Known Allergies    Objective   Vitals: Blood pressure 126/84, pulse 84, height 5' 6\" (1.676 m), weight 102 kg (225 lb 12.8 oz).    Physical Exam  Vitals reviewed.   Constitutional:       Appearance: She is well-developed. She is obese.   HENT:      Head: Normocephalic and atraumatic.   Eyes:      Conjunctiva/sclera: Conjunctivae normal.      Pupils: Pupils are equal, round, and reactive to light.      Comments: Wears glasses   Cardiovascular:      Rate and Rhythm: Normal rate and regular rhythm.      Heart sounds: Normal heart sounds.   Pulmonary:      Effort: Pulmonary effort is normal.      Breath sounds: Normal breath sounds.   Abdominal:      General: Bowel sounds are normal.      Palpations: Abdomen is soft.   Musculoskeletal:         General: Normal range of motion.      Cervical back: Normal range of motion and neck " supple.   Skin:     General: Skin is warm and dry.   Neurological:      Mental Status: She is alert and oriented to person, place, and time.   Psychiatric:         Behavior: Behavior normal.         Thought Content: Thought content normal.         Judgment: Judgment normal.         Lab Results:   Lab Results   Component Value Date/Time    Hemoglobin A1C 6.7 (H) 12/26/2024 09:28 AM    Hemoglobin A1C 7.6 (H) 09/16/2024 12:12 PM    Hemoglobin A1C 6.4 03/19/2024 10:03 AM    White Blood Cell Count 7.5 12/26/2024 09:28 AM    Hemoglobin 12.9 12/26/2024 09:28 AM    HCT 40.0 12/26/2024 09:28 AM    MCV 91 12/26/2024 09:28 AM    Platelet Count 262 12/26/2024 09:28 AM    BUN 10 12/26/2024 09:28 AM    BUN 14 09/16/2024 12:12 PM    BUN 14 04/02/2024 08:26 AM    Potassium 4.6 12/26/2024 09:28 AM    Potassium 4.9 09/16/2024 12:12 PM    Potassium 5.6 (H) 04/02/2024 08:26 AM    Chloride 104 12/26/2024 09:28 AM    Chloride 104 09/16/2024 12:12 PM    Chloride 102 04/02/2024 08:26 AM    CO2 24 12/26/2024 09:28 AM    CO2 30 09/16/2024 12:12 PM    CO2 25 04/02/2024 08:26 AM    Creatinine 0.68 12/26/2024 09:28 AM    Creatinine 0.57 (L) 09/16/2024 12:12 PM    Creatinine 0.65 04/02/2024 08:26 AM    AST 14 12/26/2024 09:28 AM    AST 17 09/16/2024 12:12 PM    AST 12 04/02/2024 08:26 AM    ALT 15 12/26/2024 09:28 AM    ALT 28 09/16/2024 12:12 PM    ALT 17 04/02/2024 08:26 AM    Total Protein 6.8 09/16/2024 12:12 PM    Protein, Total 6.1 12/26/2024 09:28 AM    Protein, Total 6.7 04/02/2024 08:26 AM    Albumin 4.1 12/26/2024 09:28 AM    Albumin 4.1 09/16/2024 12:12 PM    Albumin 4.2 04/02/2024 08:26 AM    Globulin, Total 2.0 12/26/2024 09:28 AM    Globulin, Total 2.5 04/02/2024 08:26 AM    HDL 41 12/26/2024 09:28 AM    HDL 46 04/02/2024 08:26 AM    HDL, Direct 48 (L) 09/16/2024 12:12 PM    Triglycerides 119 12/26/2024 09:28 AM    Triglycerides 130 09/16/2024 12:12 PM    Triglycerides 210 (H) 04/02/2024 08:26 AM       Portions of the record may  "have been created with voice recognition software. Occasional wrong word or \"sound a like\" substitutions may have occurred due to the inherent limitations of voice recognition software. Read the chart carefully and recognize, using context, where substitutions have occurred.    "

## 2025-01-07 NOTE — PATIENT INSTRUCTIONS
Be mindful of diet.     Stay active and stay hydrated.     Decrease Metformin to 500 mg once daily in the AM.     Continue Ozempic 0.5 mg weekly, for now.     Contact the office with any side effects or concerns.     Continue Atorvastatin.

## 2025-01-08 DIAGNOSIS — G47.33 OSA (OBSTRUCTIVE SLEEP APNEA): Primary | ICD-10-CM

## 2025-01-08 DIAGNOSIS — G47.34 SLEEP RELATED HYPOXIA: ICD-10-CM

## 2025-01-08 NOTE — PROGRESS NOTES
Sleep Study Documentation    Pre-Sleep Study       Sleep testing procedure explained to patient:YES    Patient napped prior to study:YES- less than 30 minutes. Napped after 2PM: yes    Caffeine:Dayshift worker after 12PM.  Caffeine use:NO    Alcohol:Dayshift workers after 5PM: Alcohol use:NO    Typical day for patient:YES       Study Documentation    Sleep Study Indications: EDS, loud snoring    Sleep Study: Diagnostic   Snore:Severe  Supplemental O2: no      Minimum SaO2 82%  Baseline SaO2 96%    EKG abnormalities: no     EEG abnormalities: no    Were abnormal behaviors in sleep observed:NO    Is Total Sleep Study Recording Time < 2 hours: N/A    Is Total Sleep Study Recording Time > 2 hours but study is incomplete: N/A    Is Total Sleep Study Recording Time 6 hours or more but sleep was not obtained: NO    Patient classification: employed       Post-Sleep Study    Medication used at bedtime or during sleep study:NO    Patient reports time it took to fall asleep:less than 20 minutes    Patient reports waking up during study:3 or more times.  Patient reports returning to sleep without difficulty.    Patient reports sleeping 6 to 8 hours with dreaming.    Does the Patient feel this is a typical night of sleep:typical    Patient rated sleepiness: Very sleepy or tired    PAP treatment:no.

## 2025-01-10 ENCOUNTER — HOSPITAL ENCOUNTER (OUTPATIENT)
Dept: ULTRASOUND IMAGING | Facility: HOSPITAL | Age: 55
End: 2025-01-10
Attending: FAMILY MEDICINE
Payer: COMMERCIAL

## 2025-01-10 ENCOUNTER — TELEPHONE (OUTPATIENT)
Dept: SLEEP CENTER | Facility: CLINIC | Age: 55
End: 2025-01-10

## 2025-01-10 PROCEDURE — 76705 ECHO EXAM OF ABDOMEN: CPT

## 2025-01-10 NOTE — TELEPHONE ENCOUNTER
Diagnostic study resulted, confirms mild ASHLEY (AHI-11.5) with significant hypoxia.    CPAP titration study recommended.    Call placed to patient, advised of above.    CPAP titration study scheduled 1/21/2025 at [facility].

## 2025-01-14 ENCOUNTER — RESULTS FOLLOW-UP (OUTPATIENT)
Dept: FAMILY MEDICINE CLINIC | Facility: CLINIC | Age: 55
End: 2025-01-14

## 2025-01-14 PROBLEM — K76.0 FATTY LIVER: Status: ACTIVE | Noted: 2025-01-14

## 2025-01-16 NOTE — TELEPHONE ENCOUNTER
Upon review of the In Basket request we were able to locate, review, and update the patient chart as requested for CRC: Geovanny.    Any additional questions or concerns should be emailed to the Practice Liaisons via the appropriate education email address, please do not reply via In Basket.    Thank you  Bhavana Carroll MA   [facility]

## 2025-01-21 ENCOUNTER — HOSPITAL ENCOUNTER (OUTPATIENT)
Facility: HOSPITAL | Age: 55
Discharge: HOME | End: 2025-01-21
Attending: INTERNAL MEDICINE
Payer: COMMERCIAL

## 2025-01-21 ENCOUNTER — OFFICE VISIT (OUTPATIENT)
Dept: AUDIOLOGY | Age: 55
End: 2025-01-21
Payer: COMMERCIAL

## 2025-01-21 DIAGNOSIS — H91.92 HEARING LOSS OF LEFT EAR, UNSPECIFIED HEARING LOSS TYPE: ICD-10-CM

## 2025-01-21 DIAGNOSIS — H90.3 SENSORY HEARING LOSS, BILATERAL: Primary | ICD-10-CM

## 2025-01-21 DIAGNOSIS — G47.33 OSA (OBSTRUCTIVE SLEEP APNEA): ICD-10-CM

## 2025-01-21 DIAGNOSIS — G47.34 SLEEP RELATED HYPOXIA: ICD-10-CM

## 2025-01-21 PROCEDURE — 95811 POLYSOM 6/>YRS CPAP 4/> PARM: CPT

## 2025-01-21 PROCEDURE — 92567 TYMPANOMETRY: CPT

## 2025-01-21 PROCEDURE — 95811 POLYSOM 6/>YRS CPAP 4/> PARM: CPT | Performed by: INTERNAL MEDICINE

## 2025-01-21 PROCEDURE — 92557 COMPREHENSIVE HEARING TEST: CPT

## 2025-01-21 NOTE — PROGRESS NOTES
Diagnostic Hearing Evaluation    Name:  Judith Velazquez  :  1970  Age:  54 y.o.   MRN:  [id number]  Date of Evaluation: 25     HISTORY:     Reason for visit: Known Hearing Loss in the left ear only    Judith Velazquez is being seen today at the request of Dr. Knowles for an initial  evaluation of hearing. The patient reports a longstanding nerve related hearing loss in her left ear since childhood.    EVALUATION:    Otoscopic Evaluation:   Right Ear: Unremarkable, canal clear   Left Ear: Unremarkable, canal clear    Tympanometry:   Right Ear: Type A; normal middle ear pressure and static compliance    Left Ear: Type A; normal middle ear pressure and static compliance     Speech Audiometry:  Speech Reception (SRT)    Right Ear: 20 dB HL    Left Ear: 80 dB HL    Word Recognition Scores (WRS):  Right Ear: excellent (100 % correct)     Left Ear: very poor (1/10 % correct)    Stimuli: [id number]    Pure Tone Audiometry:  Conventional pure tone audiometry from 250 - 8000 Hz  was obtained with good reliability and revealed the following:     Right Ear: Normal sloping to mild sensorineural hearing loss (SNHL)    Left Ear: Severe sensorineural hearing loss (SNHL)     *see attached audiogram    RECOMMENDATIONS:  Annual hearing eval, Consult ENT, Return to [facility]. for F/U, and Hearing Aid Evaluation    CI Evaluation recommended. Patient is scheduled to come back a CROS vs a regular hearing aid.    PATIENT EDUCATION:   The results of today's results and recommendations were reviewed with the patient and her hearing thresholds were explained at length. Treatment options, including amplification and communication strategies, were discussed as appropriate. The patient voiced understanding of her test results. Questions were addressed and the patient was encouraged to contact our department should concerns arise.      Delfino Araujo., [facility]  Clinical Audiologist  [facility] AUDIOLOGY & HEARING  [facility]  [address] [address]

## 2025-01-22 ENCOUNTER — TELEPHONE (OUTPATIENT)
Dept: SLEEP CENTER | Facility: CLINIC | Age: 55
End: 2025-01-22

## 2025-01-22 DIAGNOSIS — G47.33 OSA (OBSTRUCTIVE SLEEP APNEA): Primary | ICD-10-CM

## 2025-01-22 NOTE — TELEPHONE ENCOUNTER
CPAP sleep study resulted and shows an effective titration resulting in AHI 0.5. CPAP ordered.     Call to patient, reviewed results and recommendation for CPAP.      Patient wishes to call insurance for DME provider and will schedule compliance appointment at that time.     Need:  DME provider - process script  Compliance appointment Funmilayo Robertson.

## 2025-01-22 NOTE — PROGRESS NOTES
Sleep Study Documentation    Pre-Sleep Study       Sleep testing procedure explained to patient:YES    Patient napped prior to study:NO    Caffeine:Dayshift worker after 12PM.  Caffeine use:NO    Alcohol:Dayshift workers after 5PM: Alcohol use:NO    Typical day for patient:YES       Study Documentation    Sleep Study Indications: In lab diagnostic study with PATRICK >5    Sleep Study: Treatment   Optimal PAP pressure: +13 cmH2O  Leak:Small  Snore:Eliminated  REM Obtained:yes  Supplemental O2: no    Minimum SaO2 86%  Baseline SaO2 95%    PAP mask choice (final)ResMed AirTouch F20  PAP mask type:full face  PAP pressure at which snoring was eliminated +9cmH2O  Minimum SaO2 at final PAP pressure 90%  Mode of Therapy:CPAP  ETCO2:No  CPAP changed to BiPAP:No    Mode of Therapy:CPAP    EKG abnormalities: no     EEG abnormalities: no    Were abnormal behaviors in sleep observed:NO    Is Total Sleep Study Recording Time < 2 hours: N/A    Is Total Sleep Study Recording Time > 2 hours but study is incomplete: N/A    Is Total Sleep Study Recording Time 6 hours or more but sleep was not obtained: NO    Patient classification: employed       Post-Sleep Study    Medication used at bedtime or during sleep study:NO    Patient reports time it took to fall asleep:less than 20 minutes    Patient reports waking up during study:3 or more times.  Patient reports returning to sleep without difficulty.    Patient reports sleeping 6 to 8 hours without dreaming.    Does the Patient feel this is a typical night of sleep:better than usual    Patient rated sleepiness: Somewhat sleepy or tired    PAP treatment:yes: Post PAP treatment patient reports feeling better and  would wear PAP mask at home.

## 2025-01-24 ENCOUNTER — TELEPHONE (OUTPATIENT)
Dept: PSYCHIATRY | Facility: CLINIC | Age: 55
End: 2025-01-24

## 2025-01-27 ENCOUNTER — TELEPHONE (OUTPATIENT)
Age: 55
End: 2025-01-27

## 2025-01-27 NOTE — TELEPHONE ENCOUNTER
Patient called for ENT appointment. Transferred to [facility] for sooner appt.

## 2025-01-28 ENCOUNTER — OFFICE VISIT (OUTPATIENT)
Dept: AUDIOLOGY | Age: 55
End: 2025-01-28

## 2025-01-28 DIAGNOSIS — H90.3 SENSORY HEARING LOSS, BILATERAL: Primary | ICD-10-CM

## 2025-01-28 NOTE — PROGRESS NOTES
Progress Note    Name:  Judith Velazquez  :  1970  Age:  54 y.o.  MRN:  [id number]  Date of Evaluation: 25     Patient is here to trial a Cros system. Patient voiced good sound quality. Patient will return in 2 weeks. We might try a traditional hearing aid for sound awareness.    Loaner agreement is attached.    Delfino Araujo., [facility]  Clinical Audiologist

## 2025-02-03 ENCOUNTER — TELEPHONE (OUTPATIENT)
Dept: DERMATOLOGY | Facility: CLINIC | Age: 55
End: 2025-02-03

## 2025-02-03 NOTE — TELEPHONE ENCOUNTER
Spoke to patient to RS 11/17/25 appt with Dr Dowell in [place]. Due to practice no longer in [place] patient was RS to 11/20/25 at 230 pm with Dr Dowell in [place]. My janie message with new appt message sent also.

## 2025-02-11 ENCOUNTER — OFFICE VISIT (OUTPATIENT)
Dept: AUDIOLOGY | Age: 55
End: 2025-02-11

## 2025-02-11 DIAGNOSIS — H90.3 SENSORY HEARING LOSS, BILATERAL: Primary | ICD-10-CM

## 2025-02-12 NOTE — PROGRESS NOTES
Progress Note    Name:  Judith Velazquez  :  1970  Age:  54 y.o.  MRN:  [id number]  Date of Evaluation: 25     Patient was undecided on CROS benefit and opted to trial a hearing aid for the left ear only. Patient will follow up in 2 weeks. Patient has not started the OVR process and inquired about private pay prices.    Delfino Araujo., CCC-A  Clinical Audiologist

## 2025-02-24 ENCOUNTER — OFFICE VISIT (OUTPATIENT)
Dept: AUDIOLOGY | Age: 55
End: 2025-02-24

## 2025-02-24 DIAGNOSIS — H90.3 SENSORY HEARING LOSS, BILATERAL: Primary | ICD-10-CM

## 2025-02-24 NOTE — PROGRESS NOTES
Progress Note    Name:  Judith Velazquez  :  1970  Age:  54 y.o.  MRN:  [id number]  Date of Evaluation: 25     Patient liked the hearing aid in her left ear. She opted to go through OVR. Before OVR, she would like to try th CROS system again.     cros for the left and hearing aid for the right if interested in trying again before OVR.    Delfino Araujo., [facility]  Clinical Audiologist

## 2025-03-05 ENCOUNTER — TELEPHONE (OUTPATIENT)
Age: 55
End: 2025-03-05

## 2025-03-05 NOTE — TELEPHONE ENCOUNTER
Patient called in she is going to be using [employer] as her DME supplier  [employer] is requesting copy of sleep study, recent office notes, and script for CPAP machine faxed over  Fax: [phone]  # [phone]

## 2025-03-05 NOTE — TELEPHONE ENCOUNTER
Pt stating she been trying to get her CPAP ordering and all that since January.  She talked to her insurance [employer] would like the prescription, they need a copy of the sleep study and a copy of the office notes.  Does she have to call sleep  but she got referred by Dr Knowles for the sleep study.    Can we send the information for her.?    [phone] fax#

## 2025-03-05 NOTE — TELEPHONE ENCOUNTER
Farrah representative at [facility] called in stating they are unable to read the sleep study results she said they are very blurry.  She is requesting that if possible that they can be emailed to her instead at [email or web address] . I informed patient that due to privacy this may not be possible but that I will still let office know. Her call back info is [phone]    Thanks

## 2025-04-08 ENCOUNTER — OFFICE VISIT (OUTPATIENT)
Dept: CARDIOLOGY CLINIC | Facility: CLINIC | Age: 55
End: 2025-04-08
Payer: COMMERCIAL

## 2025-04-08 VITALS
DIASTOLIC BLOOD PRESSURE: 64 MMHG | SYSTOLIC BLOOD PRESSURE: 110 MMHG | HEIGHT: 66 IN | BODY MASS INDEX: 36.38 KG/M2 | WEIGHT: 226.4 LBS | HEART RATE: 66 BPM

## 2025-04-08 DIAGNOSIS — G47.33 OSA (OBSTRUCTIVE SLEEP APNEA): ICD-10-CM

## 2025-04-08 DIAGNOSIS — I49.3 PVC'S (PREMATURE VENTRICULAR CONTRACTIONS): Primary | ICD-10-CM

## 2025-04-08 DIAGNOSIS — E78.2 MIXED HYPERLIPIDEMIA: ICD-10-CM

## 2025-04-08 PROCEDURE — 99214 OFFICE O/P EST MOD 30 MIN: CPT | Performed by: INTERNAL MEDICINE

## 2025-04-08 PROCEDURE — 93000 ELECTROCARDIOGRAM COMPLETE: CPT | Performed by: INTERNAL MEDICINE

## 2025-04-08 NOTE — PROGRESS NOTES
Cardiology Consultation     Judith Velazquez  [id number]  1970  [facility] [place] [id number] SONU SOTO  [address] [id number] [phone]    1. PVC's (premature ventricular contractions)        2. ASHLEY (obstructive sleep apnea)        3. Mixed hyperlipidemia            Discussion/Summary:    Intermittent palpitations and PVCs - Alayna is describing classic PVCs and she wore an extended monitor in September that did show 8000 PVCs per day.  Her palpitations did improve on the combination of Toprol XL and magnesium supplementation, but they did come back when she ran out of Toprol-XL.  The palpitations are minimal at this point.  I am going to have her change the magnesium supplementation over to magnesium oxide to see if this helps even better.  Also cutting back on caffeine/alcohol could help these as well.  Regular physical/cardiovascular exercise could help as well.  We will see her back in 1 year.    Mixed hyperlipidemia - Her LDL was 171 when I met her.  Given her diabetes mellitus we did start atorvastatin 40 mg daily and her LDL improved to 86.  Blood work will continue to be followed closely.    Obstructive sleep apnea - Alayna is now on CPAP.    HPI:    Mrs. Velazquez comes in for a consultation and second opinion to discuss her intermittent palpitations that have been bothering her over the last year.  Alayna was having intermittent palpitations after having carpal tunnel surgery last year and she eventually sought consultation with cardiology at [place] in September.  There she had an echocardiogram and a 1 week extended ambulatory Holter.  Her echocardiogram showed normal LV systolic function with no significant valve disease.  Her extended ambulatory Holter did show a 7% burden of PVCs, averaging near 8000/day.  She also had infrequent PACs with 2 short runs of nonsustained atrial tachycardia.   Since then she was started on Toprol-XL 25 mg daily but also tried magnesium supplementation.  She noticed that with magnesium supplementation her palpitations did improve but are still present today.  She is also treated for type 2 diabetes mellitus and does have uncontrolled mixed hyperlipidemia.    Alayna describes classic intermittent PVCs.  She gets an intermittent flutter, particularly occurring at rest, and she will feel a skipped beat followed by a vigorous beat.  In many cases this will induce a reactive cough which is common.  These will occur randomly and not usually with exertion.  She has noticed certain triggers like emotional stress and caffeine, which she has worked on in the past.  Since these PVCs were bothersome to her we did start Toprol-XL 25 mg daily and she started magnesium supplementation.  She did rather well on this regimen and got to a point where there was practically no palpitations.  However then she had run out of her metoprolol and was not on it for a while, noticing at that point the palpitations accelerated.  She is now back on her regimen.  Alayna had also been diagnosed with obstructive sleep apnea and is now on CPAP.  He did have her go through an exercise treadmill test which was negative for ischemia.    Alayna overall feels well.  Other than some minor intermittent palpitations, she has not had any other cardiac symptoms.  No chest pain or any symptoms of angina.  She denies shortness of breath or any signs/symptoms of CHF.  She is active without limitations.  She denies any exertional symptoms.  No lightheadedness or syncope.      Patient Active Problem List   Diagnosis    Major depressive disorder, recurrent episode, moderate (HCC)    Attention deficit hyperactivity disorder, combined type    Type 2 diabetes mellitus, without long-term current use of insulin (HCC)    Mixed hyperlipidemia    PVC's (premature ventricular contractions)    Hearing loss of left ear    ASHLEY  (obstructive sleep apnea)    Sleep related hypoxia    Fatty liver     Past Medical History:   Diagnosis Date    ADHD (attention deficit hyperactivity disorder)     Atrial fibrillation (HCC)     Carpal tunnel syndrome     Chronic cough     Started few years ago    Depression     Diabetes 1.5, managed as type 2 (HCC)     Diabetes mellitus (HCC)     GERD (gastroesophageal reflux disease)     Hyperlipidemia     PVC's (premature ventricular contractions)     Shingles 2005     Social History     Socioeconomic History    Marital status: /Civil Union     Spouse name: Not on file    Number of children: Not on file    Years of education: Not on file    Highest education level: Not on file   Occupational History    Not on file   Tobacco Use    Smoking status: Never    Smokeless tobacco: Never   Vaping Use    Vaping status: Never Used   Substance and Sexual Activity    Alcohol use: Yes    Drug use: Never    Sexual activity: Yes     Partners: Male     Birth control/protection: Male Sterilization, Female Sterilization     Comment: Tubal   Other Topics Concern    Not on file   Social History Narrative    Works at [employer]      Social Drivers of Health     Financial Resource Strain: Not on file   Food Insecurity: Not on file   Transportation Needs: Not on file   Physical Activity: Not on file   Stress: Not on file   Social Connections: Not on file   Intimate Partner Violence: Not on file   Housing Stability: Not on file      Family History   Problem Relation Age of Onset    Diabetes Mother     Dementia Mother     Diabetes Father     ALS Father 76    Heart attack Father 50    Sleep apnea Father     Snoring Father     Heart disease Father     Stroke Father      Past Surgical History:   Procedure Laterality Date     SECTION      , ,     HERNIA REPAIR Left     age 6    HERNIA REPAIR Right     age 5    TUBAL LIGATION             Current Outpatient Medications:     atorvastatin  "(LIPITOR) 40 mg tablet, Take 1 tablet (40 mg total) by mouth daily, Disp: 90 tablet, Rfl: 3    Blood Glucose Monitoring Suppl (Contour Next One) JOHN, Inject 1 each under the skin daily, Disp: 1 each, Rfl: 0    Contour Next Test test strip, Use 1 each daily Test as directed- 1x daily, Disp: 100 each, Rfl: 1    MAGNESIUM GLYCINATE PO, 120 mg, Disp: , Rfl:     metFORMIN HCl  MG/5ML SRER, Take 2 tablets by mouth daily 1 tablet in AM and 1 tablet in PM with Dinner. (Patient taking differently: Take 2 tablets by mouth daily 1 tablet in AM), Disp: , Rfl:     metoprolol succinate (TOPROL-XL) 25 mg 24 hr tablet, Take 25 mg by mouth daily, Disp: , Rfl:     Microlet Lancets MISC, Inject under the skin in the morning Test BG 1x daily, Disp: 100 each, Rfl: 1    Omega-3 Fatty Acids (OMEGA-3 PO), Take 3,000 mg by mouth in the morning, Disp: , Rfl:     semaglutide, 0.25 or 0.5 mg/dose, (Ozempic, 0.25 or 0.5 MG/DOSE,) 2 mg/3 mL injection pen, Inject 0.25 mg weekly for 4 weeks then increase to 0.5 mg weekly., Disp: 3 mL, Rfl: 6    TURMERIC PO, Take 500 mg by mouth, Disp: , Rfl:     Vitamin D-Vitamin K (VITAMIN K2-VITAMIN D3 PO), Take by mouth D3 5000 IU K2 100 MCG, Disp: , Rfl:   No Known Allergies  Vitals:    04/08/25 0856   BP: 110/64   BP Location: Left arm   Patient Position: Sitting   Cuff Size: Standard   Pulse: 66   Weight: 103 kg (226 lb 6.4 oz)   Height: 5' 6\" (1.676 m)       Labs:  Lab Results   Component Value Date    K 4.6 12/26/2024     12/26/2024    CO2 24 12/26/2024    BUN 10 12/26/2024    CREATININE 0.68 12/26/2024    CREATININE 0.57 (L) 09/16/2024    CALCIUM 9.5 09/16/2024     Lab Results   Component Value Date    WBC 7.5 12/26/2024    HGB 12.9 12/26/2024    HCT 40.0 12/26/2024    MCV 91 12/26/2024     12/26/2024     Lab Results   Component Value Date    TRIG 119 12/26/2024    HDL 41 12/26/2024     Imaging:   Cardiology testing reviewed from her prior cardiologist.  As stated she had an extended " "ambulatory monitor for 7 days that showed frequent PVCs, averaging 8000/day with a total burden of 7%.  Echocardiogram showing normal LV systolic function without significant valve disease.    ECG obtained today demonstrates normal sinus rhythm and is within normal limits.    Review of Systems:  Review of Systems   Constitutional: Negative.    HENT: Negative.     Eyes: Negative.    Respiratory: Negative.     Cardiovascular:  Positive for palpitations.   Gastrointestinal: Negative.    Musculoskeletal: Negative.    Skin: Negative.    Allergic/Immunologic: Negative.    Neurological: Negative.    Hematological: Negative.    Psychiatric/Behavioral: Negative.     All other systems reviewed and are negative.    Vitals:    04/08/25 0856   BP: 110/64   BP Location: Left arm   Patient Position: Sitting   Cuff Size: Standard   Pulse: 66   Weight: 103 kg (226 lb 6.4 oz)   Height: 5' 6\" (1.676 m)     Physical Exam  Vitals and nursing note reviewed.   Constitutional:       Appearance: She is well-developed.   HENT:      Head: Normocephalic and atraumatic.   Eyes:      General: No scleral icterus.        Right eye: No discharge.         Left eye: No discharge.      Pupils: Pupils are equal, round, and reactive to light.   Neck:      Thyroid: No thyromegaly.      Vascular: No JVD.   Cardiovascular:      Rate and Rhythm: Normal rate and regular rhythm. No extrasystoles are present.     Pulses: Normal pulses. No decreased pulses.      Heart sounds: Normal heart sounds, S1 normal and S2 normal. No murmur heard.     No friction rub. No gallop.   Pulmonary:      Effort: Pulmonary effort is normal. No respiratory distress.      Breath sounds: Normal breath sounds. No wheezing, rhonchi or rales.   Abdominal:      General: Bowel sounds are normal. There is no distension.      Palpations: Abdomen is soft.      Tenderness: There is no abdominal tenderness.   Musculoskeletal:         General: No tenderness or deformity. Normal range of " motion.      Cervical back: Normal range of motion and neck supple.      Right lower leg: No edema.      Left lower leg: No edema.   Skin:     General: Skin is warm and dry.      Findings: No rash.   Neurological:      Mental Status: She is alert and oriented to person, place, and time.      Cranial Nerves: No cranial nerve deficit.   Psychiatric:         Thought Content: Thought content normal.         Judgment: Judgment normal.       Counseling / Coordination of Care  Total office time spent today 25 minutes.  Greater than 50% of total time was spent with the patient and / or family counseling and / or coordination of care.

## 2025-04-10 DIAGNOSIS — E66.813 CLASS 3 OBESITY: ICD-10-CM

## 2025-04-10 DIAGNOSIS — Z79.4 TYPE 2 DIABETES MELLITUS WITH HYPERGLYCEMIA, WITH LONG-TERM CURRENT USE OF INSULIN (HCC): ICD-10-CM

## 2025-04-10 DIAGNOSIS — E11.65 TYPE 2 DIABETES MELLITUS WITH HYPERGLYCEMIA, WITH LONG-TERM CURRENT USE OF INSULIN (HCC): ICD-10-CM

## 2025-04-10 RX ORDER — SEMAGLUTIDE 0.68 MG/ML
INJECTION, SOLUTION SUBCUTANEOUS
Qty: 3 ML | Refills: 5 | Status: SHIPPED | OUTPATIENT
Start: 2025-04-10

## 2025-04-22 DIAGNOSIS — R00.2 INTERMITTENT PALPITATIONS: ICD-10-CM

## 2025-04-22 DIAGNOSIS — I49.3 PVC'S (PREMATURE VENTRICULAR CONTRACTIONS): ICD-10-CM

## 2025-04-22 RX ORDER — ATORVASTATIN CALCIUM 40 MG/1
40 TABLET, FILM COATED ORAL DAILY
Qty: 90 TABLET | Refills: 1 | Status: SHIPPED | OUTPATIENT
Start: 2025-04-22

## 2025-04-23 ENCOUNTER — OFFICE VISIT (OUTPATIENT)
Dept: ENDOCRINOLOGY | Facility: HOSPITAL | Age: 55
End: 2025-04-23
Payer: COMMERCIAL

## 2025-04-23 VITALS
DIASTOLIC BLOOD PRESSURE: 84 MMHG | SYSTOLIC BLOOD PRESSURE: 110 MMHG | HEIGHT: 66 IN | HEART RATE: 76 BPM | WEIGHT: 224.6 LBS | BODY MASS INDEX: 36.1 KG/M2

## 2025-04-23 DIAGNOSIS — E11.65 TYPE 2 DIABETES MELLITUS WITH HYPERGLYCEMIA, WITH LONG-TERM CURRENT USE OF INSULIN (HCC): Primary | ICD-10-CM

## 2025-04-23 DIAGNOSIS — E78.2 MIXED HYPERLIPIDEMIA: ICD-10-CM

## 2025-04-23 DIAGNOSIS — E66.813 CLASS 3 OBESITY: ICD-10-CM

## 2025-04-23 DIAGNOSIS — Z79.4 TYPE 2 DIABETES MELLITUS WITH HYPERGLYCEMIA, WITH LONG-TERM CURRENT USE OF INSULIN (HCC): Primary | ICD-10-CM

## 2025-04-23 LAB
ALBUMIN SERPL-MCNC: 4.5 G/DL (ref 3.8–4.9)
ALBUMIN/CREAT UR: <9 MG/G CREAT (ref 0–29)
ALP SERPL-CCNC: 93 IU/L (ref 44–121)
ALT SERPL-CCNC: 14 IU/L (ref 0–32)
AST SERPL-CCNC: 15 IU/L (ref 0–40)
BASOPHILS # BLD AUTO: 0 X10E3/UL (ref 0–0.2)
BASOPHILS NFR BLD AUTO: 1 %
BILIRUB SERPL-MCNC: 0.4 MG/DL (ref 0–1.2)
BUN SERPL-MCNC: 13 MG/DL (ref 6–24)
BUN/CREAT SERPL: 19 (ref 9–23)
CALCIUM SERPL-MCNC: 10.1 MG/DL (ref 8.7–10.2)
CHLORIDE SERPL-SCNC: 104 MMOL/L (ref 96–106)
CO2 SERPL-SCNC: 22 MMOL/L (ref 20–29)
CREAT SERPL-MCNC: 0.67 MG/DL (ref 0.57–1)
CREAT UR-MCNC: 33.1 MG/DL
EGFR: 105 ML/MIN/1.73
EOSINOPHIL # BLD AUTO: 0.4 X10E3/UL (ref 0–0.4)
EOSINOPHIL NFR BLD AUTO: 6 %
ERYTHROCYTE [DISTWIDTH] IN BLOOD BY AUTOMATED COUNT: 12.1 % (ref 11.7–15.4)
EST. AVERAGE GLUCOSE BLD GHB EST-MCNC: 148 MG/DL
GLOBULIN SER-MCNC: 2.4 G/DL (ref 1.5–4.5)
GLUCOSE SERPL-MCNC: 111 MG/DL (ref 70–99)
HBA1C MFR BLD: 6.8 % (ref 4.8–5.6)
HCT VFR BLD AUTO: 38.9 % (ref 34–46.6)
HGB BLD-MCNC: 13.1 G/DL (ref 11.1–15.9)
IMM GRANULOCYTES # BLD: 0 X10E3/UL (ref 0–0.1)
IMM GRANULOCYTES NFR BLD: 0 %
LYMPHOCYTES # BLD AUTO: 3.3 X10E3/UL (ref 0.7–3.1)
LYMPHOCYTES NFR BLD AUTO: 49 %
MCH RBC QN AUTO: 30 PG (ref 26.6–33)
MCHC RBC AUTO-ENTMCNC: 33.7 G/DL (ref 31.5–35.7)
MCV RBC AUTO: 89 FL (ref 79–97)
MICROALBUMIN UR-MCNC: <3 UG/ML
MONOCYTES # BLD AUTO: 0.5 X10E3/UL (ref 0.1–0.9)
MONOCYTES NFR BLD AUTO: 8 %
NEUTROPHILS # BLD AUTO: 2.4 X10E3/UL (ref 1.4–7)
NEUTROPHILS NFR BLD AUTO: 36 %
PLATELET # BLD AUTO: 245 X10E3/UL (ref 150–450)
POTASSIUM SERPL-SCNC: 5.9 MMOL/L (ref 3.5–5.2)
PROT SERPL-MCNC: 6.9 G/DL (ref 6–8.5)
RBC # BLD AUTO: 4.37 X10E6/UL (ref 3.77–5.28)
SODIUM SERPL-SCNC: 143 MMOL/L (ref 134–144)
WBC # BLD AUTO: 6.6 X10E3/UL (ref 3.4–10.8)

## 2025-04-23 PROCEDURE — 99214 OFFICE O/P EST MOD 30 MIN: CPT | Performed by: NURSE PRACTITIONER

## 2025-04-23 NOTE — PROGRESS NOTES
Name: Judith Velazquez      : 1970      MRN: [id number]  Encounter Provider: RA Flores  Encounter Date: 2025   Encounter department: [facility] [place]      :  Assessment & Plan  Type 2 diabetes mellitus with hyperglycemia, with long-term current use of insulin (HCC)    Lab Results   Component Value Date    HGBA1C 6.7 (H) 2024       Orders:    CBC and differential; Future    Comprehensive metabolic panel; Future    Hemoglobin A1C; Future    Lipid panel; Future    Class 3 obesity    Orders:    CBC and differential; Future    Comprehensive metabolic panel; Future    Hemoglobin A1C; Future    Lipid panel; Future    Mixed hyperlipidemia    Orders:    CBC and differential; Future    Comprehensive metabolic panel; Future    Hemoglobin A1C; Future    Lipid panel; Future    Plan:  1) T2DM: Recent hemoglobin A1c is improved to 6.8.  No blood sugars available for review.  Her BMI is 36.25.  After some discussion, she will continue Ozempic 0.5 mg weekly and Metformin 500 mg with breakfast.  She will contact the office with any side effects or concerns. She will provide blood sugars checked twice daily at alternating times for review.  Hemoglobin A1c prior to next visit.     2) Hypertension: She is normotensive in the office today.  Continue metoprolol.  Check comprehensive metabolic panel prior to next visit.     3) Hyperlipidemia: Continue atorvastatin. Check fasting lipid panel prior to next visit.    History of Present Illness     54 y.o. female with a history of type 2 diabetes Without any reported complications with other PMHx of htn, hlp, class 3 obesity, MDD, MARI, ASHLEY who presents today for follow-up.  She was diagnosed with type 2 diabetes in 2023.  She does have a history of gestational diabetes with her third son however, this was managed with lifestyle changes only.  She is currently treated with metformin 500 mg daily at dinner  "and Ozempic 0.5 mg weekly.  She does complain of some GERD like symptoms at times in the evenings.  Recent hemoglobin A1c from April 22, 2025 is improved to 6.8.  She denies any polyuria, polydipsia or polyphasia.  He denies any recent episodes of hypoglycemia.  Diabetic eye exams and diabetic foot exams are up-to-date at this time.   also has insulin-dependent diabetes.     For her hypertension, she is treated with metoprolol.  Her hyperlipidemia is treated with atorvastatin.    Last Eye Exam: 10/03/2024  Last Foot Exam: 03/19/2024  Health Maintenance   Topic Date Due    Diabetic Foot Exam  03/19/2025    Diabetic Eye Exam  10/03/2026           Review of Systems   Constitutional: Negative.  Negative for chills, fatigue and fever.   HENT: Negative.  Negative for trouble swallowing and voice change.    Eyes: Negative.  Negative for photophobia, pain, discharge, redness, itching and visual disturbance.   Respiratory: Negative.  Negative for chest tightness and shortness of breath.    Cardiovascular: Negative.  Negative for chest pain.   Gastrointestinal: Negative.  Negative for abdominal pain, constipation, diarrhea and vomiting.   Endocrine: Negative for cold intolerance, heat intolerance, polydipsia, polyphagia and polyuria.   Genitourinary: Negative.    Musculoskeletal: Negative.    Skin: Negative.    Allergic/Immunologic: Negative.    Neurological: Negative.  Negative for dizziness, syncope, light-headedness and headaches.   Hematological: Negative.    Psychiatric/Behavioral: Negative.     All other systems reviewed and are negative.   as per HPI      Objective   /84   Pulse 76   Ht 5' 6\" (1.676 m)   Wt 102 kg (224 lb 9.6 oz)   BMI 36.25 kg/m²      Body mass index is 36.25 kg/m².  Wt Readings from Last 3 Encounters:   04/23/25 102 kg (224 lb 9.6 oz)   04/08/25 103 kg (226 lb 6.4 oz)   01/07/25 102 kg (225 lb 12.8 oz)     Physical Exam  Vitals reviewed.   Constitutional:       Appearance: She is " well-developed. She is obese.   HENT:      Head: Normocephalic and atraumatic.   Eyes:      Conjunctiva/sclera: Conjunctivae normal.      Pupils: Pupils are equal, round, and reactive to light.   Cardiovascular:      Rate and Rhythm: Normal rate and regular rhythm.      Pulses: no weak pulses.           Dorsalis pedis pulses are 1+ on the right side and 1+ on the left side.        Posterior tibial pulses are 1+ on the right side and 1+ on the left side.      Heart sounds: Normal heart sounds.   Pulmonary:      Effort: Pulmonary effort is normal.      Breath sounds: Normal breath sounds.   Abdominal:      General: Bowel sounds are normal.      Palpations: Abdomen is soft.   Musculoskeletal:         General: Normal range of motion.      Cervical back: Normal range of motion and neck supple.   Feet:      Right foot:      Skin integrity: No ulcer, skin breakdown, erythema, warmth, callus or dry skin.      Left foot:      Skin integrity: No ulcer, skin breakdown, erythema, warmth, callus or dry skin.   Skin:     General: Skin is warm and dry.   Neurological:      Mental Status: She is alert and oriented to person, place, and time.   Psychiatric:         Behavior: Behavior normal.         Thought Content: Thought content normal.         Judgment: Judgment normal.   Patient's shoes and socks removed.    Right Foot/Ankle   Right Foot Inspection  Skin Exam: skin normal and skin intact. No dry skin, no warmth, no callus, no erythema, no maceration, no abnormal color, no pre-ulcer, no ulcer and no callus.     Toe Exam: ROM and strength within normal limits.     Sensory   Monofilament testing: intact    Vascular  Capillary refills: < 3 seconds  The right DP pulse is 1+. The right PT pulse is 1+.     Left Foot/Ankle  Left Foot Inspection  Skin Exam: skin normal and skin intact. No dry skin, no warmth, no erythema, no maceration, normal color, no pre-ulcer, no ulcer and no callus.     Toe Exam: ROM and strength within normal  "limits.     Sensory   Monofilament testing: intact    Vascular  Capillary refills: < 3 seconds  The left DP pulse is 1+. The left PT pulse is 1+.     Assign Risk Category  No deformity present  No loss of protective sensation  No weak pulses  Risk: 0      Labs:   Lab Results   Component Value Date    HGBA1C 6.7 (H) 12/26/2024    HGBA1C 7.6 (H) 09/16/2024    HGBA1C 6.4 03/19/2024     Lab Results   Component Value Date    CREATININE 0.68 12/26/2024    CREATININE 0.57 (L) 09/16/2024    CREATININE 0.65 04/02/2024    BUN 10 12/26/2024    K 4.6 12/26/2024     12/26/2024    CO2 24 12/26/2024     eGFR   Date Value Ref Range Status   12/26/2024 105 >59 mL/min/1.73 Final   09/16/2024 107 ml/min/1.73sq m Final     Lab Results   Component Value Date    HDL 41 12/26/2024    TRIG 119 12/26/2024    CHOLHDL 3.6 12/26/2024     Lab Results   Component Value Date    ALT 15 12/26/2024    AST 14 12/26/2024    ALKPHOS 98 09/16/2024     No results found for: \"[id number]\"    There are no Patient Instructions on file for this visit.    Discussed with the patient and all questioned fully answered. She will call me if any problems arise.    "

## 2025-05-12 DIAGNOSIS — F33.1 MAJOR DEPRESSIVE DISORDER, RECURRENT EPISODE, MODERATE (HCC): ICD-10-CM

## 2025-05-12 DIAGNOSIS — E66.813 CLASS 3 OBESITY: ICD-10-CM

## 2025-05-12 DIAGNOSIS — E11.65 TYPE 2 DIABETES MELLITUS WITH HYPERGLYCEMIA, WITH LONG-TERM CURRENT USE OF INSULIN (HCC): ICD-10-CM

## 2025-05-12 DIAGNOSIS — E78.2 MIXED HYPERLIPIDEMIA: ICD-10-CM

## 2025-05-12 DIAGNOSIS — Z79.4 TYPE 2 DIABETES MELLITUS WITH HYPERGLYCEMIA, WITH LONG-TERM CURRENT USE OF INSULIN (HCC): ICD-10-CM

## 2025-05-13 ENCOUNTER — OFFICE VISIT (OUTPATIENT)
Dept: SLEEP CENTER | Facility: HOSPITAL | Age: 55
End: 2025-05-13
Payer: COMMERCIAL

## 2025-05-13 VITALS
HEIGHT: 66 IN | BODY MASS INDEX: 35.84 KG/M2 | SYSTOLIC BLOOD PRESSURE: 112 MMHG | WEIGHT: 223 LBS | DIASTOLIC BLOOD PRESSURE: 74 MMHG

## 2025-05-13 DIAGNOSIS — F90.2 ATTENTION DEFICIT HYPERACTIVITY DISORDER, COMBINED TYPE: ICD-10-CM

## 2025-05-13 DIAGNOSIS — G47.34 SLEEP RELATED HYPOXIA: ICD-10-CM

## 2025-05-13 DIAGNOSIS — I49.3 PVC'S (PREMATURE VENTRICULAR CONTRACTIONS): ICD-10-CM

## 2025-05-13 DIAGNOSIS — F33.1 MAJOR DEPRESSIVE DISORDER, RECURRENT EPISODE, MODERATE (HCC): ICD-10-CM

## 2025-05-13 DIAGNOSIS — E11.9 TYPE 2 DIABETES MELLITUS WITHOUT COMPLICATION, WITHOUT LONG-TERM CURRENT USE OF INSULIN (HCC): ICD-10-CM

## 2025-05-13 DIAGNOSIS — K21.9 GASTROESOPHAGEAL REFLUX DISEASE, UNSPECIFIED WHETHER ESOPHAGITIS PRESENT: ICD-10-CM

## 2025-05-13 DIAGNOSIS — G47.19 EXCESSIVE DAYTIME SLEEPINESS: ICD-10-CM

## 2025-05-13 DIAGNOSIS — G47.33 OSA (OBSTRUCTIVE SLEEP APNEA): Primary | ICD-10-CM

## 2025-05-13 DIAGNOSIS — E66.9 OBESITY (BMI 30-39.9): ICD-10-CM

## 2025-05-13 PROCEDURE — 99214 OFFICE O/P EST MOD 30 MIN: CPT | Performed by: INTERNAL MEDICINE

## 2025-05-13 NOTE — PROGRESS NOTES
Name: Judith Velazquez      : 1970      MRN: [id number]  Encounter Provider: Mao Das MD  Encounter Date: 2025   [facility]  :  Assessment & Plan  ASHLEY (obstructive sleep apnea)    Orders:    PAP DME Pressure Change    PAP DME Resupply/Reorder    Sleep related hypoxia         Excessive daytime sleepiness         PVC's (premature ventricular contractions)         Type 2 diabetes mellitus without complication, without long-term current use of insulin (HCC)    Lab Results   Component Value Date    HGBA1C 6.8 (H) 2025            Gastroesophageal reflux disease, unspecified whether esophagitis present         Major depressive disorder, recurrent episode, moderate (HCC)         Attention deficit hyperactivity disorder, combined type         Obesity (BMI 30-39.9)             PLAN: Above listed Problems & Comorbidities were Addressed this Visit.  Improved/stable/controlled/resolved conditions as reviewed in notes.   Results of prior studies and physiologic data reviewed  with the patient.   I discussed treatment options with risks and benefits.  Treatment with  PAP is medically necessary and Judith is agreable to continue use.   Care of equipment, methods to improve comfort using PAP and importance of compliance with therapy were discussed.  Pressure setting:change 10-14 cmH2O with EPR at 0. Mask type  full face  Rx provided to replace supplies and Care coordinated with DME provider.   Discussed strategies for weight reduction.    Follow-up is advised in 1 year or sooner if needed to monitor progress, compliance and to adjust therapy.        History of Present Illness   HPI          Follow-Up Note - Sleep Center   Judith Velazquez  54 y.o. female  :1970  MRN:[id number]  DOS:2025    CC: I saw this patient for follow-up in clinic today for Sleep disordered breathing, Coexisting Sleep and Medical Problems.. Interval changes:  Treatment was initiated using a ResMed machine.  The patient had both diagnostic and therapeutic sleep studies and is here to review results and to initiate/adjust therapy.      The diagnostic study confirmed an apnea/hypopnea index (AHI) of 11.5 events per hour of sleep.  The AHI in the supine position was 37.7.  The AHI during REM sleep was 37.3.  Loud intensity snoring was noted and there were 3 respiratory effort related arousals resulting in an RDI of 12.1/h.. The amount of sleep time below 90% was 22.2 minutes.  The lowest oxygen saturation was 82.0%.     During the subsequent therapeutic study, sleep disordered breathing was adequately remediated with PAP at 13 H2O.      PFSH, Problem List, Medications & Allergies were reviewed in EMR.   She  has a past medical history of Abnormal ECG (9/22/23), ADHD (attention deficit hyperactivity disorder) (2021), Atrial fibrillation ([facility]), Carpal tunnel syndrome, Chronic cough, Depression, Diabetes 1.5, managed as type 2 ([facility]), Diabetes mellitus ([facility]) (12/23), GERD (gastroesophageal reflux disease), Hyperlipidemia, PVC's (premature ventricular contractions), Shingles (07/2005), Skin tag, and Verruca.    She has a current medication list which includes the following prescription(s): atorvastatin, contour next one, contour next test, magnesium oxide -mg supplement, metformin hcl er, metoprolol succinate, microlet lancets, omega-3 fatty acids, ozempic (0.25 or 0.5 mg/dose), turmeric, and vitamin d-vitamin k.    PHYSIOLOGICAL DATA REVIEW : Device has been used 56/60 days and average on days use 7 hours and 33 minutes.   using PAP > 4 hours/night >90%. Estimated PATRICK 0.2/hour with pressure of 12.4cm H2O@90th/95th percentile;.  INTERPRETATION: Compliance is excellent; Pressure setting is:optimal; ;     SUBJECTIVE: With respect to use of PAP, Judith  is experiencing minimal adverse effects: dry mouth/throat.She derives benefit.. Is satisfied with sleep and daytime function.  "    Sleep Routine: Judith reports getting (Patient-Rptd) (P) 7 hrs sleep; she has no difficulty initiating and maintaining sleep . She arises needing an alarm and feels more refreshed since on Rx.Judith reports significantly improved excessive daytime sleepiness, and not dozing off as frequently has in the past..  She rated herself at Total score: (Patient-Rptd) (P) 8 /24 on the Nichols Sleepiness Scale.   Other issues: None reported.     Habits: Reports that she has never smoked. She has never used smokeless tobacco.,  Reports current alcohol use.,  Reports no history of drug use., Caffeine use: limited until  ; Exercise routine: irregular.      ROS: Significant for weight has been stable..  She is reporting no nasal or respiratory symptoms.  PVCs are controlled on metoprolol.  Mood and ADHD symptoms have improved and she is stable off medication.    EXAM: /74   Ht 5' 6\" (1.676 m)   Wt 101 kg (223 lb)   BMI 35.99 kg/m²     Wt Readings from Last 3 Encounters:   05/13/25 101 kg (223 lb)   04/23/25 102 kg (224 lb 9.6 oz)   04/08/25 103 kg (226 lb 6.4 oz)      Patient is well groomed; well appearing.   CNS: Alert, orientated, speech clear & coherent  Psych: cooperative and in no distress. Mental state: Appears normal.  H&N: EOMI; NC/AT: No facial pressure marks, no rashes.    Skin/Extrem: col & hydration normal; no edema  Resp: Respiratory effort is normal  Physical findings otherwise essentially unchanged from previous.    Sincerely,     Authenticated electronically on 05/13/25   Board Certified Specialist     Portions of the record may have been created with voice recognition software. Occasional wrong word or \"sound a like\" substitutions may have occurred due to the inherent limitations of voice recognition software. There may also be notations and random deletions of words or characters from malfunctioning software. Read the chart carefully and recognize, using context, where substitutions/deletions " have occurred.          Review of Systems

## 2025-05-14 ENCOUNTER — TELEPHONE (OUTPATIENT)
Dept: SLEEP CENTER | Facility: CLINIC | Age: 55
End: 2025-05-14

## 2025-05-15 ENCOUNTER — TELEPHONE (OUTPATIENT)
Dept: ENDOCRINOLOGY | Facility: HOSPITAL | Age: 55
End: 2025-05-15

## 2025-05-15 DIAGNOSIS — Z79.4 TYPE 2 DIABETES MELLITUS WITH HYPERGLYCEMIA, WITH LONG-TERM CURRENT USE OF INSULIN (HCC): ICD-10-CM

## 2025-05-15 DIAGNOSIS — E11.65 TYPE 2 DIABETES MELLITUS WITH HYPERGLYCEMIA, WITH LONG-TERM CURRENT USE OF INSULIN (HCC): ICD-10-CM

## 2025-05-15 DIAGNOSIS — E66.813 CLASS 3 OBESITY: ICD-10-CM

## 2025-05-15 NOTE — TELEPHONE ENCOUNTER
Patient called requesting refill for     semaglutide, 0.25 or 0.5 mg/dose, (Ozempic, 0.25 or 0.5 MG/DOSE,) 2 mg/3 mL injection pen   . Patient made aware medication was refilled on 4.10.2025 for 3  mls with 5 refills to [facility] pharmacy. Patient instructed to contact the pharmacy and speak with someone directly to obtain refills of medication. Patient advised to call back for refill if their pharmacy is unable to assist them. Patient verbalized understanding.      [facility] may have billed 1 box for a 42 day sypply - which is not allowing the patient to received the next dose timely.  Patient will call [facility] to discuss the days supply

## 2025-05-16 DIAGNOSIS — E11.65 TYPE 2 DIABETES MELLITUS WITH HYPERGLYCEMIA, WITH LONG-TERM CURRENT USE OF INSULIN (HCC): ICD-10-CM

## 2025-05-16 DIAGNOSIS — E66.813 CLASS 3 OBESITY: ICD-10-CM

## 2025-05-16 DIAGNOSIS — Z79.4 TYPE 2 DIABETES MELLITUS WITH HYPERGLYCEMIA, WITH LONG-TERM CURRENT USE OF INSULIN (HCC): ICD-10-CM

## 2025-05-27 ENCOUNTER — PATIENT MESSAGE (OUTPATIENT)
Dept: DERMATOLOGY | Facility: CLINIC | Age: 55
End: 2025-05-27

## 2025-05-28 ENCOUNTER — OFFICE VISIT (OUTPATIENT)
Dept: FAMILY MEDICINE CLINIC | Facility: CLINIC | Age: 55
End: 2025-05-28
Payer: COMMERCIAL

## 2025-05-28 VITALS
WEIGHT: 226.4 LBS | BODY MASS INDEX: 36.54 KG/M2 | SYSTOLIC BLOOD PRESSURE: 123 MMHG | HEART RATE: 64 BPM | TEMPERATURE: 97.8 F | OXYGEN SATURATION: 98 % | DIASTOLIC BLOOD PRESSURE: 59 MMHG

## 2025-05-28 DIAGNOSIS — L25.5 RHUS DERMATITIS: Primary | ICD-10-CM

## 2025-05-28 DIAGNOSIS — F33.1 MAJOR DEPRESSIVE DISORDER, RECURRENT EPISODE, MODERATE (HCC): ICD-10-CM

## 2025-05-28 PROCEDURE — 99213 OFFICE O/P EST LOW 20 MIN: CPT | Performed by: FAMILY MEDICINE

## 2025-05-28 RX ORDER — TRIAMCINOLONE ACETONIDE 1 MG/G
CREAM TOPICAL 2 TIMES DAILY
Qty: 45 G | Refills: 0 | Status: SHIPPED | OUTPATIENT
Start: 2025-05-28

## 2025-05-28 NOTE — PROGRESS NOTES
Name: Judith Velazquez      : 1970      MRN: [id number]  Encounter Provider: Sue Knowles DO  Encounter Date: 2025   Encounter department: [facility]    Assessment & Plan  Rhus dermatitis  Rx for topical steroid cream.     Orders:    triamcinolone (KENALOG) 0.1 % cream; Apply topically 2 (two) times a day    Major depressive disorder, recurrent episode, moderate (HCC)  PHQ9 today was an 8  Previously seen by psych at [facility].   Currently on no meds and not seeing counselor anymore.   Declines meds.     Depression Screening Follow-up Plan: Patient's depression screening was positive with a PHQ-9 score of 8. Patient assessed for underlying major depression. They have no active suicidal ideations. Brief counseling provided and recommend additional follow-up/re-evaluation next office visit.              History of Present Illness     HPI  Patient is a 54 year old female with DM2, hyperlipidemia, depression, ADHD, ASHLEY and PVC's who is being seen today for complaint of rash on forearm.   First noticed 1.5 weeks ago  Itchy.   Had been working in garden prior to onset to her rash.     Review of Systems  Past Medical History[1]  Past Surgical History[2]  Family History[3]  Social History[4]  Medications[5]  No Known Allergies  Immunization History   Administered Date(s) Administered    COVID-19 PFIZER VACCINE 0.3 ML IM 2021, 05/15/2021, 2023    INFLUENZA 2022, 2023    Influenza Recombinant Preservative Free Im 2024    Tdap 2018     Objective   /59 (BP Location: Left arm, Patient Position: Sitting, Cuff Size: Standard)   Pulse 64   Temp 97.8 °F (36.6 °C) (Tympanic)   Wt 103 kg (226 lb 6.4 oz)   SpO2 98%   BMI 36.54 kg/m²     Physical Exam    Skin:     Comments: Right forearm with erythematous papules and vesicles in linear distribution right forearm.          Depression Screening Follow-up Plan: Patient's depression  screening was positive with a PHQ-9 score of 8. Patient assessed for underlying major depression. They have no active suicidal ideations. Brief counseling provided and recommend additional follow-up/re-evaluation next office visit.         [1]   Past Medical History:  Diagnosis Date    Abnormal ECG 23    ADHD (attention deficit hyperactivity disorder)     Atrial fibrillation (HCC)     Carpal tunnel syndrome     Chronic cough     Started few years ago    Depression     Diabetes 1.5, managed as type 2 (HCC)     Diabetes mellitus (HCC)     GERD (gastroesophageal reflux disease)     Hyperlipidemia     PVC's (premature ventricular contractions)     Shingles 2005    Skin tag     Verruca     Had a few when I was a kid   [2]   Past Surgical History:  Procedure Laterality Date     SECTION      , ,     HERNIA REPAIR Left     age 6    HERNIA REPAIR Right     age 5    TUBAL LIGATION         [3]   Family History  Problem Relation Name Age of Onset    Diabetes Mother Harini Ruiztenhouse     Dementia Mother Harinielan GuevaraAnna     Arrhythmia Mother Harini Castillo     Diabetes Father Wm. Hardy Castillo     ALS Father Wm. Hardyzita GuevaraAnna 76    Heart attack Father Wm. Hardy Anna 50    Sleep apnea Father Wm. Hardy Anna     Snoring Father Wm. Hardy Anna     Heart disease Father Wm. Hardy Anna     Stroke Father Wm. Hardy Anna     Basal cell carcinoma Father Wm. Hardy Anna    [4]   Social History  Tobacco Use    Smoking status: Never    Smokeless tobacco: Never   Vaping Use    Vaping status: Never Used   Substance and Sexual Activity    Alcohol use: Yes     Alcohol/week: 1.0 standard drink of alcohol     Types: 1 Cans of beer per week     Comment: I work for [employer]    Drug use: Never    Sexual activity: Yes     Partners: Male     Birth control/protection: Post-menopausal, Male Sterilization, Female Sterilization, Other      Comment: Tubal   [5]   Current Outpatient Medications on File Prior to Visit   Medication Sig    atorvastatin (LIPITOR) 40 mg tablet TAKE 1 TABLET BY MOUTH EVERY DAY    Blood Glucose Monitoring Suppl (Contour Next One) JOHN Inject 1 each under the skin daily    glucose blood (Contour Next Test) test strip TEST ONCE DAILY AS DIRECTED    Magnesium Oxide -Mg Supplement 400 MG CAPS Take 1 capsule (400 mg total) by mouth 2 (two) times a day    metFORMIN HCl  MG/5ML SRER Take 2 tablets by mouth daily 1 tablet in AM and 1 tablet in PM with Dinner. (Patient taking differently: Take 1 tablet by mouth daily 1 tablet in AM and 1 tablet in PM with Dinner.)    metoprolol succinate (TOPROL-XL) 25 mg 24 hr tablet Take 25 mg by mouth in the morning.    Microlet Lancets MISC Inject under the skin in the morning Test BG 1x daily    Omega-3 Fatty Acids (OMEGA-3 PO) Take 3,000 mg by mouth in the morning    semaglutide, 0.25 or 0.5 mg/dose, (Ozempic, 0.25 or 0.5 MG/DOSE,) 2 mg/3 mL injection pen Inject 0.75 mL (0.5 mg total) under the skin every 7 days    TURMERIC PO Take 500 mg by mouth    Vitamin D-Vitamin K (VITAMIN K2-VITAMIN D3 PO) Take by mouth D3 5000 IU  K2 100 MCG

## 2025-05-28 NOTE — ASSESSMENT & PLAN NOTE
PHQ9 today was an 8  Previously seen by psych at [facility] [place].   Currently on no meds and not seeing counselor anymore.   Declines meds.     Depression Screening Follow-up Plan: Patient's depression screening was positive with a PHQ-9 score of 8. Patient assessed for underlying major depression. They have no active suicidal ideations. Brief counseling provided and recommend additional follow-up/re-evaluation next office visit.

## 2025-05-28 NOTE — PATIENT COMMUNICATION
Patient called in to schedule.  Advised of Dr Dowell's availability and offered next available in [facility] or [place].    Patient stated she will contact PCP or Urgent Care first

## 2025-05-30 ENCOUNTER — NURSE TRIAGE (OUTPATIENT)
Dept: OTHER | Facility: OTHER | Age: 55
End: 2025-05-30

## 2025-05-31 ENCOUNTER — OFFICE VISIT (OUTPATIENT)
Dept: URGENT CARE | Facility: CLINIC | Age: 55
End: 2025-05-31
Payer: COMMERCIAL

## 2025-05-31 VITALS
BODY MASS INDEX: 36.32 KG/M2 | HEART RATE: 62 BPM | TEMPERATURE: 97.5 F | OXYGEN SATURATION: 97 % | DIASTOLIC BLOOD PRESSURE: 75 MMHG | HEIGHT: 66 IN | WEIGHT: 226 LBS | RESPIRATION RATE: 16 BRPM | SYSTOLIC BLOOD PRESSURE: 141 MMHG

## 2025-05-31 DIAGNOSIS — L23.7 POISON IVY DERMATITIS: Primary | ICD-10-CM

## 2025-05-31 DIAGNOSIS — L98.9 SKIN PROBLEM: ICD-10-CM

## 2025-05-31 PROCEDURE — 96372 THER/PROPH/DIAG INJ SC/IM: CPT

## 2025-05-31 PROCEDURE — 99203 OFFICE O/P NEW LOW 30 MIN: CPT

## 2025-05-31 RX ORDER — METHYLPREDNISOLONE SODIUM SUCCINATE 40 MG/ML
40 INJECTION, POWDER, LYOPHILIZED, FOR SOLUTION INTRAMUSCULAR; INTRAVENOUS ONCE
Status: COMPLETED | OUTPATIENT
Start: 2025-05-31 | End: 2025-05-31

## 2025-05-31 RX ORDER — METHYLPREDNISOLONE 4 MG/1
TABLET ORAL
Qty: 21 TABLET | Refills: 0 | Status: SHIPPED | OUTPATIENT
Start: 2025-05-31

## 2025-05-31 RX ORDER — MUPIROCIN 20 MG/G
OINTMENT TOPICAL 2 TIMES DAILY
Qty: 22 G | Refills: 0 | Status: SHIPPED | OUTPATIENT
Start: 2025-05-31

## 2025-05-31 RX ADMIN — METHYLPREDNISOLONE SODIUM SUCCINATE 40 MG: 40 INJECTION, POWDER, LYOPHILIZED, FOR SOLUTION INTRAMUSCULAR; INTRAVENOUS at 10:35

## 2025-05-31 NOTE — TELEPHONE ENCOUNTER
"REASON FOR CONVERSATION: Rash    SYMPTOMS: severe itching, rash worsening    OTHER HEALTH INFORMATION: was seen on 5/28 and given kenalog cream. Pt concerned cream is making rash worse    PROTOCOL DISPOSITION: See PCP Within 3 Days    CARE ADVICE PROVIDED: be seen this weekend, take benadryl or zyrtec or claritin    PRACTICE FOLLOW-UP: n/a            Reason for Disposition   [1] Severe localized itching AND [2] after 2 days of steroid cream    Answer Assessment - Initial Assessment Questions  1. APPEARANCE of RASH: \"Describe the rash.\"       Blistery red dots on arms    2. LOCATION: \"Where is the rash located?\"       Arms    3. NUMBER: \"How many spots are there?\"       Many    4. SIZE: \"How big are the spots?\" (Inches, centimeters or compare to size of a coin)       Pencil eraser sized, and the bigger spots are a little bigger than that and clustered    5. ONSET: \"When did the rash start?\"       At least 1.5-2 weeks ago    6. ITCHING: \"Does the rash itch?\" If Yes, ask: \"How bad is the itch?\"  (Scale 0-10; or none, mild, moderate, severe)      Profusely itchy    7. PAIN: \"Does the rash hurt?\" If Yes, ask: \"How bad is the pain?\"  (Scale 0-10; or none, mild, moderate, severe)      Comes and goes- when it hurts its 6-7/10    8. OTHER SYMPTOMS: \"Do you have any other symptoms?\" (e.g., fever)       Denies      Saw dr on 5/28 and it was much smaller. Only a few dots at that time. Was prescribed kenalog at that time. Feels like kenalog has made it worse.    Protocols used: Rash or Redness - Localized-Adult-AH    "

## 2025-05-31 NOTE — PATIENT INSTRUCTIONS
You received a steroid injection at time of today's visit. You may experience some pain or discomfort at the site of injection.     Start steroid pill pack tomorrow.  *Remember to check your blood sugar at least once daily - blood sugars will be higher while taking steroids and for a few days afterwards*    May use Benadryl 25 mg - 50 mg every 4-6 hours as needed for itching. Be careful of drowsiness, do not take before driving or operating heavy machinery.     Use cool compresses, oatmeal baths, calamine lotion, and emollients such as Aveeno oatmeal for eczema as needed for comfort.     Can also use Tecnu cream (OTC) to wash after potential contact with poison ivy.     Use unscented/sensitive formula soaps, lotions, and detergents. Limit hot showers.     Follow up with your PCP in 7-10 days for any persistent symptoms.      Go to the ER if symptoms significantly worsen.

## 2025-05-31 NOTE — TELEPHONE ENCOUNTER
"Regarding: poison ivy/severe itching/oozing blisters  ----- Message from Homero MILLER sent at 5/30/2025  8:57 PM EDT -----  \"I was recently prescribed Kenalog cream for poison ivy on my arms, but the cream does not seem to be working and the spots are now on my other arm as well. They have blisters and some oozing coming from the blisters and severely itchy as well\"    "

## 2025-05-31 NOTE — PROGRESS NOTES
[facility]        NAME: Judith Velazquez is a 54 y.o. female  : 1970    MRN: [id number]  DATE: May 31, 2025  TIME: 10:45 AM    Assessment and Plan   Poison ivy dermatitis [L23.7]  1. Poison ivy dermatitis  methylPREDNISolone sodium succinate (Solu-MEDROL) injection 40 mg    methylPREDNISolone 4 MG tablet therapy pack      2. Skin problem  mupirocin (BACTROBAN) 2 % ointment          Patient with PMH T2DM.  HbA1C 6.8 25.   Takes Metformin daily, injects semaglutide weekly.  States she does not typically check her blood sugar daily.  Patient is aware that steroids will increase her blood sugar while on the medication and for a few days afterwards. I did encourage her to monitor them at least once daily, she acknowledges.      Patient Instructions     You received a steroid injection at time of today's visit. You may experience some pain or discomfort at the site of injection.     Start steroid pill pack tomorrow.  *Remember to check your blood sugar at least once daily - blood sugars will be higher while taking steroids and for a few days afterwards*    May use Benadryl 25 mg - 50 mg every 4-6 hours as needed for itching. Be careful of drowsiness, do not take before driving or operating heavy machinery.     Use cool compresses, oatmeal baths, calamine lotion, and emollients such as Aveeno oatmeal for eczema as needed for comfort.     Can also use Tecnu cream (OTC) to wash after potential contact with poison ivy.     Use unscented/sensitive formula soaps, lotions, and detergents. Limit hot showers.     Follow up with your PCP in 7-10 days for any persistent symptoms.      Go to the ER if symptoms significantly worsen.     If tests are performed, our office will contact you with results only if changes need to made to the care plan discussed with you at the visit. You can review your full results on [facility].      Chief Complaint     Chief Complaint   Patient presents with    Rash  "    Pt presents with bilateral arm red, raised, vesicular skin rash with onset two weeks ago. States was treated by PCP on 5/28 with topical Kenalog cream with worsening symptoms with increased pain and itching.          History of Present Illness       54-year-old female who presents for second evaluation of suspected poison ivy rash to her right arm. Patient states she was initially seen by her PCP on 5/28 and was prescribed triamcinolone cream. Patient reports no improvement since using this, states rash has worsened. Blistering has spread, noticed some spots on left arm. Also requesting evaluation of redness and soreness inside her right nare that seems to come and go. Follows with derm, never mentioned.        Review of Systems   Review of Systems   Constitutional:  Negative for diaphoresis and fever.   Eyes:  Negative for redness and itching.   Respiratory:  Negative for shortness of breath and wheezing.    Cardiovascular:  Negative for chest pain.   Gastrointestinal:  Negative for abdominal pain and nausea.   Musculoskeletal:  Negative for joint swelling.   Skin:  Positive for rash.   Neurological:  Negative for dizziness and headaches.         Current Medications     Current Medications[1]    Current Allergies     Allergies as of 05/31/2025    (No Known Allergies)            The following portions of the patient's history were reviewed and updated as appropriate: allergies, current medications, past family history, past medical history, past social history, past surgical history and problem list.     Past Medical History[2]    Past Surgical History[3]    Family History[4]      Medications have been verified.        Objective   /75 (BP Location: Left arm, Patient Position: Sitting, Cuff Size: Standard)   Pulse 62   Temp 97.5 °F (36.4 °C) (Tympanic)   Resp 16   Ht 5' 6\" (1.676 m)   Wt 103 kg (226 lb)   SpO2 97%   BMI 36.48 kg/m²        Physical Exam     Physical Exam  Vitals and nursing note " reviewed.   Constitutional:       General: She is not in acute distress.     Appearance: She is not ill-appearing.   HENT:      Head: Normocephalic and atraumatic.      Nose:      Comments: Erythema inside right nare, no visible lesions.     Mouth/Throat:      Mouth: Mucous membranes are moist.      Pharynx: Oropharynx is clear.     Eyes:      Conjunctiva/sclera: Conjunctivae normal.       Cardiovascular:      Rate and Rhythm: Normal rate and regular rhythm.   Pulmonary:      Effort: Pulmonary effort is normal.      Breath sounds: Normal breath sounds.     Musculoskeletal:         General: Normal range of motion.      Cervical back: Normal range of motion and neck supple.     Skin:     General: Skin is warm and dry.      Capillary Refill: Capillary refill takes less than 2 seconds.      Findings: Erythema and rash present. Rash is vesicular.     Neurological:      Mental Status: She is alert and oriented to person, place, and time.                        [1]   Current Outpatient Medications:     atorvastatin (LIPITOR) 40 mg tablet, TAKE 1 TABLET BY MOUTH EVERY DAY, Disp: 90 tablet, Rfl: 1    Blood Glucose Monitoring Suppl (Contour Next One) JOHN, Inject 1 each under the skin daily, Disp: 1 each, Rfl: 0    glucose blood (Contour Next Test) test strip, TEST ONCE DAILY AS DIRECTED, Disp: 100 strip, Rfl: 1    Magnesium Oxide -Mg Supplement 400 MG CAPS, Take 1 capsule (400 mg total) by mouth 2 (two) times a day, Disp: 180 capsule, Rfl: 3    metFORMIN HCl  MG/5ML SRER, Take 2 tablets by mouth daily 1 tablet in AM and 1 tablet in PM with Dinner., Disp: , Rfl:     methylPREDNISolone 4 MG tablet therapy pack, Use as directed on package, Disp: 21 tablet, Rfl: 0    metoprolol succinate (TOPROL-XL) 25 mg 24 hr tablet, Take 25 mg by mouth in the morning., Disp: , Rfl:     Microlet Lancets MISC, Inject under the skin in the morning Test BG 1x daily, Disp: 100 each, Rfl: 1    mupirocin (BACTROBAN) 2 % ointment, Apply  topically 2 (two) times a day, Disp: 22 g, Rfl: 0    Omega-3 Fatty Acids (OMEGA-3 PO), Take 3,000 mg by mouth in the morning, Disp: , Rfl:     semaglutide, 0.25 or 0.5 mg/dose, (Ozempic, 0.25 or 0.5 MG/DOSE,) 2 mg/3 mL injection pen, Inject 0.75 mL (0.5 mg total) under the skin every 7 days, Disp: 3 mL, Rfl: 5    triamcinolone (KENALOG) 0.1 % cream, Apply topically 2 (two) times a day, Disp: 45 g, Rfl: 0    TURMERIC PO, Take 500 mg by mouth, Disp: , Rfl:     Vitamin D-Vitamin K (VITAMIN K2-VITAMIN D3 PO), Take by mouth D3 5000 IU K2 100 MCG, Disp: , Rfl:   No current facility-administered medications for this visit.  [2]   Past Medical History:  Diagnosis Date    Abnormal ECG 23    ADHD (attention deficit hyperactivity disorder)     Atrial fibrillation (HCC)     Carpal tunnel syndrome     Chronic cough     Started few years ago    Depression     Diabetes 1.5, managed as type 2 (HCC)     Diabetes mellitus (HCC)     GERD (gastroesophageal reflux disease)     Hyperlipidemia     PVC's (premature ventricular contractions)     Shingles 2005    Skin tag     Verruca     Had a few when I was a kid   [3]   Past Surgical History:  Procedure Laterality Date     SECTION      , ,     HERNIA REPAIR Left     age 6    HERNIA REPAIR Right     age 5    TUBAL LIGATION         [4]   Family History  Problem Relation Name Age of Onset    Diabetes Mother Harini Castillo     Dementia Mother Harini Anna     Arrhythmia Mother Harini Anna     Diabetes Father Wm. Hardy Castillo     ALS Father Wm. Hardy Castillo 76    Heart attack Father Wm. Hardy Castillo 50    Sleep apnea Father Wm. Hardy Castillo     Snoring Father Wm. Hardy Castillo     Heart disease Father Wm. Hardy Castillo     Stroke Father Wm. Hardy Castilol     Basal cell carcinoma Father Wm. Hardy Castillo

## 2025-07-22 ENCOUNTER — APPOINTMENT (OUTPATIENT)
Dept: LAB | Facility: HOSPITAL | Age: 55
End: 2025-07-22
Payer: COMMERCIAL

## 2025-07-22 DIAGNOSIS — E66.813 CLASS 3 OBESITY: ICD-10-CM

## 2025-07-22 DIAGNOSIS — E11.65 TYPE 2 DIABETES MELLITUS WITH HYPERGLYCEMIA, WITH LONG-TERM CURRENT USE OF INSULIN (HCC): ICD-10-CM

## 2025-07-22 DIAGNOSIS — Z79.4 TYPE 2 DIABETES MELLITUS WITH HYPERGLYCEMIA, WITH LONG-TERM CURRENT USE OF INSULIN (HCC): ICD-10-CM

## 2025-07-22 DIAGNOSIS — E78.2 MIXED HYPERLIPIDEMIA: ICD-10-CM

## 2025-07-22 LAB
ALBUMIN SERPL BCG-MCNC: 4.2 G/DL (ref 3.5–5)
ALP SERPL-CCNC: 78 U/L (ref 34–104)
ALT SERPL W P-5'-P-CCNC: 16 U/L (ref 7–52)
ANION GAP SERPL CALCULATED.3IONS-SCNC: 9 MMOL/L (ref 4–13)
AST SERPL W P-5'-P-CCNC: 14 U/L (ref 13–39)
BASOPHILS # BLD AUTO: 0.01 THOUSANDS/ÂΜL (ref 0–0.1)
BASOPHILS NFR BLD AUTO: 0 % (ref 0–1)
BILIRUB SERPL-MCNC: 0.4 MG/DL (ref 0.2–1)
BUN SERPL-MCNC: 12 MG/DL (ref 5–25)
CALCIUM SERPL-MCNC: 9.4 MG/DL (ref 8.4–10.2)
CHLORIDE SERPL-SCNC: 103 MMOL/L (ref 96–108)
CHOLEST SERPL-MCNC: 155 MG/DL (ref ?–200)
CO2 SERPL-SCNC: 29 MMOL/L (ref 21–32)
CREAT SERPL-MCNC: 0.71 MG/DL (ref 0.6–1.3)
EOSINOPHIL # BLD AUTO: 0.37 THOUSAND/ÂΜL (ref 0–0.61)
EOSINOPHIL NFR BLD AUTO: 5 % (ref 0–6)
ERYTHROCYTE [DISTWIDTH] IN BLOOD BY AUTOMATED COUNT: 13.2 % (ref 11.6–15.1)
EST. AVERAGE GLUCOSE BLD GHB EST-MCNC: 154 MG/DL
GFR SERPL CREATININE-BSD FRML MDRD: 98 ML/MIN/1.73SQ M
GLUCOSE P FAST SERPL-MCNC: 114 MG/DL (ref 65–99)
HBA1C MFR BLD: 7 %
HCT VFR BLD AUTO: 42.3 % (ref 34.8–46.1)
HDLC SERPL-MCNC: 49 MG/DL
HGB BLD-MCNC: 13.3 G/DL (ref 11.5–15.4)
IMM GRANULOCYTES # BLD AUTO: 0.01 THOUSAND/UL (ref 0–0.2)
IMM GRANULOCYTES NFR BLD AUTO: 0 % (ref 0–2)
LDLC SERPL CALC-MCNC: 89 MG/DL (ref 0–100)
LYMPHOCYTES # BLD AUTO: 3.26 THOUSANDS/ÂΜL (ref 0.6–4.47)
LYMPHOCYTES NFR BLD AUTO: 45 % (ref 14–44)
MCH RBC QN AUTO: 29.3 PG (ref 26.8–34.3)
MCHC RBC AUTO-ENTMCNC: 31.4 G/DL (ref 31.4–37.4)
MCV RBC AUTO: 93 FL (ref 82–98)
MONOCYTES # BLD AUTO: 0.51 THOUSAND/ÂΜL (ref 0.17–1.22)
MONOCYTES NFR BLD AUTO: 7 % (ref 4–12)
NEUTROPHILS # BLD AUTO: 3.13 THOUSANDS/ÂΜL (ref 1.85–7.62)
NEUTS SEG NFR BLD AUTO: 43 % (ref 43–75)
NONHDLC SERPL-MCNC: 106 MG/DL
NRBC BLD AUTO-RTO: 0 /100 WBCS
PLATELET # BLD AUTO: 224 THOUSANDS/UL (ref 149–390)
PMV BLD AUTO: 11.9 FL (ref 8.9–12.7)
POTASSIUM SERPL-SCNC: 4 MMOL/L (ref 3.5–5.3)
PROT SERPL-MCNC: 6.8 G/DL (ref 6.4–8.4)
RBC # BLD AUTO: 4.54 MILLION/UL (ref 3.81–5.12)
SODIUM SERPL-SCNC: 141 MMOL/L (ref 135–147)
TRIGL SERPL-MCNC: 83 MG/DL (ref ?–150)
WBC # BLD AUTO: 7.29 THOUSAND/UL (ref 4.31–10.16)

## 2025-07-22 PROCEDURE — 85025 COMPLETE CBC W/AUTO DIFF WBC: CPT

## 2025-07-22 PROCEDURE — 80061 LIPID PANEL: CPT

## 2025-07-22 PROCEDURE — 83036 HEMOGLOBIN GLYCOSYLATED A1C: CPT

## 2025-07-22 PROCEDURE — 36415 COLL VENOUS BLD VENIPUNCTURE: CPT

## 2025-07-22 PROCEDURE — 80053 COMPREHEN METABOLIC PANEL: CPT

## 2025-07-23 ENCOUNTER — OFFICE VISIT (OUTPATIENT)
Dept: ENDOCRINOLOGY | Facility: HOSPITAL | Age: 55
End: 2025-07-23
Payer: COMMERCIAL

## 2025-07-23 VITALS
HEART RATE: 64 BPM | DIASTOLIC BLOOD PRESSURE: 80 MMHG | WEIGHT: 229.2 LBS | BODY MASS INDEX: 36.83 KG/M2 | SYSTOLIC BLOOD PRESSURE: 124 MMHG | HEIGHT: 66 IN

## 2025-07-23 DIAGNOSIS — E66.813 CLASS 3 OBESITY: ICD-10-CM

## 2025-07-23 DIAGNOSIS — E78.2 MIXED HYPERLIPIDEMIA: ICD-10-CM

## 2025-07-23 DIAGNOSIS — Z79.4 TYPE 2 DIABETES MELLITUS WITH HYPERGLYCEMIA, WITH LONG-TERM CURRENT USE OF INSULIN (HCC): Primary | ICD-10-CM

## 2025-07-23 DIAGNOSIS — E11.65 TYPE 2 DIABETES MELLITUS WITH HYPERGLYCEMIA, WITH LONG-TERM CURRENT USE OF INSULIN (HCC): Primary | ICD-10-CM

## 2025-07-23 PROCEDURE — 99214 OFFICE O/P EST MOD 30 MIN: CPT | Performed by: NURSE PRACTITIONER

## 2025-07-23 NOTE — PATIENT INSTRUCTIONS
Be mindful of diet.     Stay active and stay hydrated.     Continue Metformin 500 mg once daily in the AM.     Increase Ozempic to 1 mg weekly.     Contact the office with any side effects or concerns.     Continue Atorvastatin.

## 2025-07-23 NOTE — PROGRESS NOTES
Name: Judith Velazquez      : 1970      MRN: [id number]  Encounter Provider: RA Flores  Encounter Date: 2025   Encounter department: [facility]      :  Assessment & Plan  Type 2 diabetes mellitus with hyperglycemia, with long-term current use of insulin (HCC)    Lab Results   Component Value Date    HGBA1C 7.0 (H) 2025       Orders:    CBC and differential; Future    Comprehensive metabolic panel; Future    Hemoglobin A1C; Future    semaglutide, 1 mg/dose, (Ozempic, 1 MG/DOSE,) 4 mg/3 mL injection pen; Inject 0.75 mL (1 mg total) under the skin every 7 days    Mixed hyperlipidemia    Orders:    CBC and differential; Future    Comprehensive metabolic panel; Future    Hemoglobin A1C; Future    Class 3 obesity    Orders:    CBC and differential; Future    Comprehensive metabolic panel; Future    Hemoglobin A1C; Future    Plan:  1) T2DM: Recent hemoglobin A1c is 7.0.  No blood sugars available for review.  Her BMI is 36.99.  After some discussion, she will continue Metformin 500 mg with breakfast and increase Ozempic to 1 mg weekly.  She will contact the office with any side effects or concerns. She will provide blood sugars checked twice daily at alternating times for review.  Hemoglobin A1c prior to next visit.     2) Hypertension: She is normotensive in the office today.  Continue metoprolol.  Check comprehensive metabolic panel prior to next visit.     3) Hyperlipidemia: Continue atorvastatin. Check fasting lipid panel prior to next visit.        History of Present Illness     54 y.o. female with a history of type 2 diabetes Without any reported complications with other PMHx of htn, hlp, class 3 obesity, MDD, MARI, ASHLEY who presents today for follow-up.  She was diagnosed with type 2 diabetes in 2023.  She does have a history of gestational diabetes with her third son however, this was managed with lifestyle changes only.   "She is currently treated with metformin 500 mg daily at breakfast and Ozempic 0.5 mg weekly.  She does complain of some GERD like symptoms at times in the evenings.  Recent hemoglobin A1c from July 22, 2025 is 7.0.  She denies any polyuria, polydipsia or polyphasia.  He denies any recent episodes of hypoglycemia.  Diabetic eye exams and diabetic foot exams are up-to-date at this time.   also has insulin-dependent diabetes.     For her hypertension, she is treated with metoprolol.  Her hyperlipidemia is treated with atorvastatin.    Review of Systems   Constitutional: Negative.  Negative for chills, fatigue and fever.   HENT: Negative.  Negative for trouble swallowing and voice change.    Eyes: Negative.  Negative for photophobia, pain, discharge, redness, itching and visual disturbance.   Respiratory: Negative.  Negative for chest tightness and shortness of breath.    Cardiovascular: Negative.  Negative for chest pain.   Gastrointestinal: Negative.  Negative for abdominal pain, constipation, diarrhea and vomiting.   Endocrine: Negative for cold intolerance, heat intolerance, polydipsia, polyphagia and polyuria.   Genitourinary: Negative.    Musculoskeletal: Negative.    Skin: Negative.    Allergic/Immunologic: Negative.    Neurological: Negative.  Negative for dizziness, syncope, light-headedness and headaches.   Hematological: Negative.    Psychiatric/Behavioral: Negative.     All other systems reviewed and are negative.   as per HPI        Objective   /80   Pulse 64   Ht 5' 6\" (1.676 m)   Wt 104 kg (229 lb 3.2 oz)   BMI 36.99 kg/m²      Body mass index is 36.99 kg/m².  Wt Readings from Last 3 Encounters:   07/23/25 104 kg (229 lb 3.2 oz)   05/31/25 103 kg (226 lb)   05/28/25 103 kg (226 lb 6.4 oz)     Physical Exam  Vitals reviewed.   Constitutional:       Appearance: She is well-developed.   HENT:      Head: Normocephalic and atraumatic.     Eyes:      Conjunctiva/sclera: Conjunctivae normal.     "  Pupils: Pupils are equal, round, and reactive to light.       Cardiovascular:      Rate and Rhythm: Normal rate and regular rhythm.      Heart sounds: Normal heart sounds.   Pulmonary:      Effort: Pulmonary effort is normal.      Breath sounds: Normal breath sounds.   Abdominal:      General: Bowel sounds are normal.      Palpations: Abdomen is soft.     Musculoskeletal:         General: Normal range of motion.      Cervical back: Normal range of motion and neck supple.     Skin:     General: Skin is warm and dry.     Neurological:      Mental Status: She is alert and oriented to person, place, and time.     Psychiatric:         Behavior: Behavior normal.         Thought Content: Thought content normal.         Judgment: Judgment normal.       Last Eye Exam: 10/03/2024  Last Foot Exam: 04/23/2025  Health Maintenance   Topic Date Due    Diabetic Foot Exam  04/23/2026    Diabetic Eye Exam  10/03/2026         Labs: I have reviewed pertinent labs including:   Lab Results   Component Value Date    HGBA1C 7.0 (H) 07/22/2025    HGBA1C 6.8 (H) 04/22/2025    HGBA1C 6.7 (H) 12/26/2024      Lab Results   Component Value Date    CREATININE 0.71 07/22/2025    CREATININE 0.67 04/22/2025    CREATININE 0.68 12/26/2024    BUN 12 07/22/2025    K 4.0 07/22/2025     07/22/2025    CO2 29 07/22/2025      eGFR   Date Value Ref Range Status   07/22/2025 98 ml/min/1.73sq m Final      HDL   Date Value Ref Range Status   12/26/2024 41 >39 mg/dL Final     HDL, Direct   Date Value Ref Range Status   07/22/2025 49 (L) >=50 mg/dL Final     Comment:     HDL Cholesterol:       Low     <41 mg/dL     Triglycerides   Date Value Ref Range Status   07/22/2025 83 See Comment mg/dL Final     Comment:     Triglyceride:     0-9Y            <75mg/dL     10Y-17Y         <90 mg/dL       >=18Y     Normal          <150 mg/dL     Borderline High 150-199 mg/dL     High            200-499 mg/dL        Very High       >499 mg/dL    Specimen collection should  occur prior to Metamizole administration due to the potential for falsely depressed results.   12/26/2024 119 0 - 149 mg/dL Final     T. Chol/HDL Ratio   Date Value Ref Range Status   12/26/2024 3.6 0.0 - 4.4 ratio Final     Comment:                                       T. Chol/HDL Ratio                                              Men  Women                                1/2 Avg.Risk  3.4    3.3                                    Avg.Risk  5.0    4.4                                 2X Avg.Risk  9.6    7.1                                 3X Avg.Risk 23.4   11.0        ALT   Date Value Ref Range Status   07/22/2025 16 7 - 52 U/L Final     Comment:     Specimen collection should occur prior to Sulfasalazine administration due to the potential for falsely depressed results.    04/22/2025 14 0 - 32 IU/L Final     AST   Date Value Ref Range Status   07/22/2025 14 13 - 39 U/L Final   04/22/2025 15 0 - 40 IU/L Final     Alkaline Phosphatase   Date Value Ref Range Status   07/22/2025 78 34 - 104 U/L Final        Discussed with the patient and all questioned fully answered. She will call me if any problems arise.